# Patient Record
Sex: FEMALE | Race: WHITE | Employment: FULL TIME | URBAN - METROPOLITAN AREA
[De-identification: names, ages, dates, MRNs, and addresses within clinical notes are randomized per-mention and may not be internally consistent; named-entity substitution may affect disease eponyms.]

---

## 2017-11-03 ENCOUNTER — TELEPHONE (OUTPATIENT)
Dept: FAMILY MEDICINE CLINIC | Facility: CLINIC | Age: 37
End: 2017-11-03

## 2017-11-03 NOTE — TELEPHONE ENCOUNTER
Wants a px appt only on a Monday morning around 9am.  Has to have it after 12/12 to be covered by insurance and needs to have it by the end of the year. That only leaves 12/18 because we are closed for blanca which I explained to her.     She only wants

## 2018-12-17 ENCOUNTER — LAB ENCOUNTER (OUTPATIENT)
Dept: LAB | Age: 38
End: 2018-12-17
Attending: FAMILY MEDICINE
Payer: COMMERCIAL

## 2018-12-17 ENCOUNTER — OFFICE VISIT (OUTPATIENT)
Dept: FAMILY MEDICINE CLINIC | Facility: CLINIC | Age: 38
End: 2018-12-17
Payer: COMMERCIAL

## 2018-12-17 VITALS
DIASTOLIC BLOOD PRESSURE: 66 MMHG | BODY MASS INDEX: 21.52 KG/M2 | TEMPERATURE: 98 F | WEIGHT: 114 LBS | HEART RATE: 110 BPM | HEIGHT: 61 IN | SYSTOLIC BLOOD PRESSURE: 110 MMHG | RESPIRATION RATE: 16 BRPM | OXYGEN SATURATION: 98 %

## 2018-12-17 DIAGNOSIS — Z00.00 ANNUAL PHYSICAL EXAM: Primary | ICD-10-CM

## 2018-12-17 DIAGNOSIS — Z00.00 ANNUAL PHYSICAL EXAM: ICD-10-CM

## 2018-12-17 DIAGNOSIS — J02.9 SORE THROAT: ICD-10-CM

## 2018-12-17 PROCEDURE — 86706 HEP B SURFACE ANTIBODY: CPT | Performed by: FAMILY MEDICINE

## 2018-12-17 PROCEDURE — 86765 RUBEOLA ANTIBODY: CPT | Performed by: FAMILY MEDICINE

## 2018-12-17 PROCEDURE — 86735 MUMPS ANTIBODY: CPT | Performed by: FAMILY MEDICINE

## 2018-12-17 PROCEDURE — 87880 STREP A ASSAY W/OPTIC: CPT | Performed by: FAMILY MEDICINE

## 2018-12-17 PROCEDURE — 82306 VITAMIN D 25 HYDROXY: CPT | Performed by: FAMILY MEDICINE

## 2018-12-17 PROCEDURE — 80050 GENERAL HEALTH PANEL: CPT | Performed by: FAMILY MEDICINE

## 2018-12-17 PROCEDURE — 86787 VARICELLA-ZOSTER ANTIBODY: CPT | Performed by: FAMILY MEDICINE

## 2018-12-17 PROCEDURE — 36415 COLL VENOUS BLD VENIPUNCTURE: CPT | Performed by: FAMILY MEDICINE

## 2018-12-17 PROCEDURE — 99395 PREV VISIT EST AGE 18-39: CPT | Performed by: FAMILY MEDICINE

## 2018-12-17 PROCEDURE — 80061 LIPID PANEL: CPT | Performed by: FAMILY MEDICINE

## 2018-12-17 PROCEDURE — 86762 RUBELLA ANTIBODY: CPT | Performed by: FAMILY MEDICINE

## 2018-12-17 NOTE — H&P
HPI:   Chencho Rojo is a 43year old female who presents for a well woman exam. Symptoms: denies discharge, itching, burning or dysuria. No LMP recorded.   Previous pap: 7/11/16  Contraception: no                         Current Outpatient Medications: distress  SKIN: 3 non cancerous moles on the neck  HEENT: atraumatic, normocephalic; PERRLA, conjunctiva clear; ears, nose and throat are clear  NECK: supple,no adenopathy, no thyromegaly  BREASTS: no retractions, no suspicious mass, no nipple discharge or

## 2018-12-26 ENCOUNTER — TELEPHONE (OUTPATIENT)
Dept: FAMILY MEDICINE CLINIC | Facility: CLINIC | Age: 38
End: 2018-12-26

## 2018-12-26 DIAGNOSIS — Z01.84 IMMUNITY STATUS TESTING: Primary | ICD-10-CM

## 2018-12-26 NOTE — TELEPHONE ENCOUNTER
NEEDS TO NOTIFY US THAT PT NEEDS TO COME IN FOR A RE DRAW. PROCESSED INCORRECTLY. PLS NOTIFY PATIENT.

## 2018-12-26 NOTE — TELEPHONE ENCOUNTER
LM for pt to call us back see previous note quantiferon tb was not processed correctly pt needs redraw. Lab ordered.

## 2018-12-26 NOTE — TELEPHONE ENCOUNTER
Renette Opitz is leaving the office today, but will be back in the office tomorrow and Friday from 7:00am  to 3:00 pm quantiferon  TB Test was not processed correctly.

## 2018-12-26 NOTE — TELEPHONE ENCOUNTER
[facility] [phone]. What wasn't processed correctly?

## 2018-12-27 NOTE — TELEPHONE ENCOUNTER
Pt calling back - advised pt test was processed incorrectly and pt needs to return to lab to have test redrawn. Patient verbalized understanding and had no further questions.

## 2019-01-28 ENCOUNTER — TELEPHONE (OUTPATIENT)
Dept: FAMILY MEDICINE CLINIC | Facility: CLINIC | Age: 39
End: 2019-01-28

## 2019-01-28 ENCOUNTER — APPOINTMENT (OUTPATIENT)
Dept: LAB | Age: 39
End: 2019-01-28
Attending: FAMILY MEDICINE
Payer: COMMERCIAL

## 2019-01-28 DIAGNOSIS — Z01.84 IMMUNITY STATUS TESTING: ICD-10-CM

## 2019-01-28 PROCEDURE — 86480 TB TEST CELL IMMUN MEASURE: CPT | Performed by: FAMILY MEDICINE

## 2019-01-28 PROCEDURE — 36415 COLL VENOUS BLD VENIPUNCTURE: CPT | Performed by: FAMILY MEDICINE

## 2019-01-28 NOTE — TELEPHONE ENCOUNTER
Pt had q's about Hep B injection and TDAP. She dropped off a past record of injections she has had. Pls call her back to advise on what injections are needed. Also brought in forms to be filled out for physical she had in December.     Pls call when [facility]

## 2019-01-29 NOTE — TELEPHONE ENCOUNTER
Pt has had MMR titers done. Pt has not had HEP B titers, however has had 3 vaccines. Varicella titer done, polio booster given, tdap given 5/2010    Form for [facility] signed by YP, left in triage, Left message for pt to return call.

## 2019-01-30 LAB
M TB TUBERC IFN-G BLD QL: POSITIVE
M TB TUBERC IFN-G/MITOGEN IGNF BLD: 3.04 IU/ML
M TB TUBERC IFN-G/MITOGEN IGNF BLD: 3.71 IU/ML
M TB TUBERC IGNF/MITOGEN IGNF CONTROL: >10 IU/ML
MITOGEN IGNF BCKGRD COR BLD-ACNC: 0.15 IU/ML

## 2019-01-31 NOTE — TELEPHONE ENCOUNTER
Pt has had titers done, pt made appt Monday with YP for school px. May need repeat hep b vaccine. Will discuss with YP at appt. Pt will go for chest xray tomorrow. Forms in triage.

## 2019-02-01 ENCOUNTER — HOSPITAL ENCOUNTER (OUTPATIENT)
Dept: GENERAL RADIOLOGY | Facility: HOSPITAL | Age: 39
Discharge: HOME | End: 2019-02-01
Attending: FAMILY MEDICINE
Payer: COMMERCIAL

## 2019-02-01 DIAGNOSIS — R76.12 POSITIVE QUANTIFERON-TB GOLD TEST: ICD-10-CM

## 2019-02-01 PROCEDURE — 71046 X-RAY EXAM CHEST 2 VIEWS: CPT | Performed by: FAMILY MEDICINE

## 2019-02-04 ENCOUNTER — OFFICE VISIT (OUTPATIENT)
Dept: FAMILY MEDICINE CLINIC | Facility: CLINIC | Age: 39
End: 2019-02-04
Payer: COMMERCIAL

## 2019-02-04 VITALS
HEIGHT: 61 IN | DIASTOLIC BLOOD PRESSURE: 70 MMHG | OXYGEN SATURATION: 99 % | HEART RATE: 78 BPM | WEIGHT: 114 LBS | RESPIRATION RATE: 16 BRPM | SYSTOLIC BLOOD PRESSURE: 108 MMHG | BODY MASS INDEX: 21.52 KG/M2 | TEMPERATURE: 98 F

## 2019-02-04 DIAGNOSIS — Z23 NEED FOR TDAP VACCINATION: ICD-10-CM

## 2019-02-04 DIAGNOSIS — G56.03 BILATERAL CARPAL TUNNEL SYNDROME: Primary | ICD-10-CM

## 2019-02-04 DIAGNOSIS — Z23 NEED FOR HEPATITIS B VACCINATION: ICD-10-CM

## 2019-02-04 PROCEDURE — 90471 IMMUNIZATION ADMIN: CPT | Performed by: FAMILY MEDICINE

## 2019-02-04 PROCEDURE — 99213 OFFICE O/P EST LOW 20 MIN: CPT | Performed by: FAMILY MEDICINE

## 2019-02-04 PROCEDURE — 90746 HEPB VACCINE 3 DOSE ADULT IM: CPT | Performed by: FAMILY MEDICINE

## 2019-02-04 PROCEDURE — 90472 IMMUNIZATION ADMIN EACH ADD: CPT | Performed by: FAMILY MEDICINE

## 2019-02-04 PROCEDURE — 90715 TDAP VACCINE 7 YRS/> IM: CPT | Performed by: FAMILY MEDICINE

## 2019-02-04 NOTE — PROGRESS NOTES
HPI:    Patient ID: Hermann Cooper is a 43year old female. Wrist Pain    The pain is present in the right wrist and left wrist. This is a chronic problem. The current episode started more than 1 year ago. There has been no history of extremity trauma. while sleeping at night and to avoid sleeping on her wrists    2. Need for hepatitis B vaccination  - HEPATITIS B VACCINE, OVER 20    3.  Need for Tdap vaccination  - TETANUS, DIPHTHERIA TOXOIDS AND ACELLULAR PERTUSIS VACCINE (TDAP), >7 YEARS, IM USE      O

## 2019-10-05 ENCOUNTER — NURSE ONLY (OUTPATIENT)
Dept: FAMILY MEDICINE CLINIC | Facility: CLINIC | Age: 39
End: 2019-10-05
Payer: COMMERCIAL

## 2019-10-05 DIAGNOSIS — Z23 NEED FOR VACCINATION: ICD-10-CM

## 2019-10-05 PROCEDURE — 90471 IMMUNIZATION ADMIN: CPT | Performed by: FAMILY MEDICINE

## 2019-10-05 PROCEDURE — 90686 IIV4 VACC NO PRSV 0.5 ML IM: CPT | Performed by: FAMILY MEDICINE

## 2019-11-16 DIAGNOSIS — Z00.00 LABORATORY EXAMINATION ORDERED AS PART OF A COMPLETE PHYSICAL EXAMINATION: ICD-10-CM

## 2019-11-16 DIAGNOSIS — Z12.31 ENCOUNTER FOR SCREENING MAMMOGRAM FOR MALIGNANT NEOPLASM OF BREAST: Primary | ICD-10-CM

## 2019-11-30 ENCOUNTER — HOSPITAL ENCOUNTER (OUTPATIENT)
Age: 39
Discharge: HOME | End: 2019-11-30
Attending: FAMILY MEDICINE
Payer: COMMERCIAL

## 2019-11-30 VITALS
HEART RATE: 68 BPM | DIASTOLIC BLOOD PRESSURE: 100 MMHG | OXYGEN SATURATION: 100 % | RESPIRATION RATE: 18 BRPM | SYSTOLIC BLOOD PRESSURE: 135 MMHG | TEMPERATURE: 99 F

## 2019-11-30 DIAGNOSIS — S61.210A LACERATION OF RIGHT INDEX FINGER WITHOUT FOREIGN BODY WITHOUT DAMAGE TO NAIL, INITIAL ENCOUNTER: Primary | ICD-10-CM

## 2019-11-30 DIAGNOSIS — L03.011 CELLULITIS OF FINGER OF RIGHT HAND: ICD-10-CM

## 2019-11-30 PROCEDURE — 99213 OFFICE O/P EST LOW 20 MIN: CPT

## 2019-11-30 PROCEDURE — 99203 OFFICE O/P NEW LOW 30 MIN: CPT

## 2019-11-30 RX ORDER — CEPHALEXIN 500 MG/1
500 CAPSULE ORAL 3 TIMES DAILY
Qty: 21 CAPSULE | Refills: 0 | Status: SHIPPED | OUTPATIENT
Start: 2019-11-30 | End: 2019-12-07

## 2019-11-30 RX ORDER — TRAMADOL HYDROCHLORIDE 50 MG/1
50 TABLET ORAL EVERY 8 HOURS PRN
Qty: 15 TABLET | Refills: 0 | Status: SHIPPED | OUTPATIENT
Start: 2019-11-30 | End: 2019-12-05

## 2019-12-03 ENCOUNTER — HOSPITAL ENCOUNTER (OUTPATIENT)
Age: 39
Discharge: HOME | End: 2019-12-03
Attending: FAMILY MEDICINE
Payer: COMMERCIAL

## 2019-12-03 VITALS
OXYGEN SATURATION: 100 % | SYSTOLIC BLOOD PRESSURE: 122 MMHG | DIASTOLIC BLOOD PRESSURE: 88 MMHG | HEIGHT: 62 IN | WEIGHT: 116 LBS | TEMPERATURE: 99 F | RESPIRATION RATE: 18 BRPM | HEART RATE: 79 BPM | BODY MASS INDEX: 21.35 KG/M2

## 2019-12-03 DIAGNOSIS — L03.011 CELLULITIS OF FINGER OF RIGHT HAND: Primary | ICD-10-CM

## 2019-12-03 PROCEDURE — 99212 OFFICE O/P EST SF 10 MIN: CPT

## 2019-12-03 PROCEDURE — 10060 I&D ABSCESS SIMPLE/SINGLE: CPT

## 2019-12-03 PROCEDURE — 99213 OFFICE O/P EST LOW 20 MIN: CPT

## 2019-12-04 NOTE — ED INITIAL ASSESSMENT (HPI)
C/O right index finger pain that started last week. She had been treated for a cellulitis w/ Keflex on Saturday. Now is concerned that the infection is worsening and has a \"blister to side\".

## 2019-12-04 NOTE — ED PROVIDER NOTES
Patient Seen in: [facility]      History   Patient presents with:  Finger Pain    Stated Complaint: RIGHT INDEX FINGER INFECTION X 5 DAYS    HPI  34 yo F with right index finger infection that started 5 days ago, currently on Ke distress  SKIN: no rashes,no suspicious lesions noted over the exposed areas of skin   HEENT: atraumatic, normocephalic  NECK: supple  LUNGS: No tachypnea   CARDIO: No tachycardia   GI: not distended     EXTREMITIES:   Exam of R Hand -->   - swelling: ++ a

## 2019-12-11 ENCOUNTER — HOSPITAL ENCOUNTER (OUTPATIENT)
Dept: MAMMOGRAPHY | Age: 39
Discharge: HOME | End: 2019-12-11
Attending: FAMILY MEDICINE
Payer: COMMERCIAL

## 2019-12-11 ENCOUNTER — LAB ENCOUNTER (OUTPATIENT)
Dept: LAB | Age: 39
End: 2019-12-11
Attending: FAMILY MEDICINE
Payer: COMMERCIAL

## 2019-12-11 DIAGNOSIS — Z00.00 LABORATORY EXAMINATION ORDERED AS PART OF A COMPLETE PHYSICAL EXAMINATION: ICD-10-CM

## 2019-12-11 DIAGNOSIS — Z12.39 BREAST CANCER SCREENING: ICD-10-CM

## 2019-12-11 PROCEDURE — 82306 VITAMIN D 25 HYDROXY: CPT

## 2019-12-11 PROCEDURE — 80061 LIPID PANEL: CPT

## 2019-12-11 PROCEDURE — 36415 COLL VENOUS BLD VENIPUNCTURE: CPT

## 2019-12-11 PROCEDURE — 80053 COMPREHEN METABOLIC PANEL: CPT

## 2019-12-11 PROCEDURE — 77067 SCR MAMMO BI INCL CAD: CPT | Performed by: FAMILY MEDICINE

## 2019-12-11 PROCEDURE — 77063 BREAST TOMOSYNTHESIS BI: CPT | Performed by: FAMILY MEDICINE

## 2019-12-11 PROCEDURE — 84443 ASSAY THYROID STIM HORMONE: CPT

## 2019-12-11 PROCEDURE — 85025 COMPLETE CBC W/AUTO DIFF WBC: CPT

## 2020-01-03 ENCOUNTER — TELEPHONE (OUTPATIENT)
Dept: FAMILY MEDICINE CLINIC | Facility: CLINIC | Age: 40
End: 2020-01-03

## 2020-01-03 NOTE — TELEPHONE ENCOUNTER
PT GOT HER MAMMO ON 12-03-10. GOT A BILL FROM INSURANCE .00. HER INSURANCE STATES IS WAS NOT CODED FOR PHYSICAL. CAN WE RE-CODE THIS AND SEND IT THROUGH AGAIN?    PLS CALL PATIENT BACK TO ADVISE.

## 2020-01-06 NOTE — TELEPHONE ENCOUNTER
Will correct diagnosis from Z12.39 to Z12.31 and submit to [facility] coding change request for resubmission to [facility] insurance.

## 2023-06-14 ENCOUNTER — HOSPITAL ENCOUNTER (INPATIENT)
Facility: HOSPITAL | Age: 43
LOS: 7 days | Discharge: HOME | End: 2023-06-21
Attending: EMERGENCY MEDICINE | Admitting: INTERNAL MEDICINE
Payer: COMMERCIAL

## 2023-06-14 ENCOUNTER — APPOINTMENT (OUTPATIENT)
Dept: ULTRASOUND IMAGING | Facility: HOSPITAL | Age: 43
End: 2023-06-14
Attending: EMERGENCY MEDICINE
Payer: COMMERCIAL

## 2023-06-14 DIAGNOSIS — R53.1 WEAKNESS: ICD-10-CM

## 2023-06-14 DIAGNOSIS — R79.89 ABNORMAL LFTS: Primary | ICD-10-CM

## 2023-06-14 DIAGNOSIS — F10.10 ALCOHOL ABUSE: ICD-10-CM

## 2023-06-14 DIAGNOSIS — E80.6 HYPERBILIRUBINEMIA: ICD-10-CM

## 2023-06-14 DIAGNOSIS — E87.6 HYPOKALEMIA: ICD-10-CM

## 2023-06-14 LAB
ALBUMIN SERPL-MCNC: 3.3 G/DL (ref 3.4–5)
ALBUMIN/GLOB SERPL: 1 {RATIO} (ref 1–2)
ALP LIVER SERPL-CCNC: 208 U/L
ALT SERPL-CCNC: 124 U/L
ANION GAP SERPL CALC-SCNC: 9 MMOL/L (ref 0–18)
AST SERPL-CCNC: 145 U/L (ref 15–37)
BASOPHILS # BLD AUTO: 0.01 X10(3) UL (ref 0–0.2)
BASOPHILS NFR BLD AUTO: 0.2 %
BILIRUB SERPL-MCNC: 3.7 MG/DL (ref 0.1–2)
BUN BLD-MCNC: 4 MG/DL (ref 7–18)
CALCIUM BLD-MCNC: 9.1 MG/DL (ref 8.5–10.1)
CHLORIDE SERPL-SCNC: 94 MMOL/L (ref 98–112)
CO2 SERPL-SCNC: 34 MMOL/L (ref 21–32)
CREAT BLD-MCNC: 0.66 MG/DL
EOSINOPHIL # BLD AUTO: 0.01 X10(3) UL (ref 0–0.7)
EOSINOPHIL NFR BLD AUTO: 0.2 %
ERYTHROCYTE [DISTWIDTH] IN BLOOD BY AUTOMATED COUNT: 14.1 %
ETHANOL SERPL-MCNC: <3 MG/DL (ref ?–3)
GFR SERPLBLD BASED ON 1.73 SQ M-ARVRAT: 111 ML/MIN/1.73M2 (ref 60–?)
GLOBULIN PLAS-MCNC: 3.4 G/DL (ref 2.8–4.4)
GLUCOSE BLD-MCNC: 115 MG/DL (ref 70–99)
HBV SURFACE AB SER QL: NONREACTIVE
HBV SURFACE AB SERPL IA-ACNC: <3.1 MIU/ML
HBV SURFACE AG SER-ACNC: <0.1 [IU]/L
HBV SURFACE AG SERPL QL IA: NONREACTIVE
HCT VFR BLD AUTO: 35.1 %
HGB BLD-MCNC: 12 G/DL
IMM GRANULOCYTES # BLD AUTO: 0.02 X10(3) UL (ref 0–1)
IMM GRANULOCYTES NFR BLD: 0.4 %
LIPASE SERPL-CCNC: 49 U/L (ref 13–75)
LYMPHOCYTES # BLD AUTO: 1.63 X10(3) UL (ref 1–4)
LYMPHOCYTES NFR BLD AUTO: 36.4 %
MCH RBC QN AUTO: 34.3 PG (ref 26–34)
MCHC RBC AUTO-ENTMCNC: 34.2 G/DL (ref 31–37)
MCV RBC AUTO: 100.3 FL
MONOCYTES # BLD AUTO: 0.55 X10(3) UL (ref 0.1–1)
MONOCYTES NFR BLD AUTO: 12.3 %
NEUTROPHILS # BLD AUTO: 2.26 X10 (3) UL (ref 1.5–7.7)
NEUTROPHILS # BLD AUTO: 2.26 X10(3) UL (ref 1.5–7.7)
NEUTROPHILS NFR BLD AUTO: 50.5 %
OSMOLALITY SERPL CALC.SUM OF ELEC: 282 MOSM/KG (ref 275–295)
PLATELET # BLD AUTO: 177 10(3)UL (ref 150–450)
POTASSIUM SERPL-SCNC: 2.2 MMOL/L (ref 3.5–5.1)
PROT SERPL-MCNC: 6.7 G/DL (ref 6.4–8.2)
RBC # BLD AUTO: 3.5 X10(6)UL
SODIUM SERPL-SCNC: 137 MMOL/L (ref 136–145)
WBC # BLD AUTO: 4.5 X10(3) UL (ref 4–11)

## 2023-06-14 PROCEDURE — 76700 US EXAM ABDOM COMPLETE: CPT | Performed by: EMERGENCY MEDICINE

## 2023-06-14 PROCEDURE — 99223 1ST HOSP IP/OBS HIGH 75: CPT | Performed by: INTERNAL MEDICINE

## 2023-06-14 RX ORDER — MELATONIN
3 NIGHTLY PRN
Status: DISCONTINUED | OUTPATIENT
Start: 2023-06-14 | End: 2023-06-21

## 2023-06-14 RX ORDER — ENOXAPARIN SODIUM 100 MG/ML
40 INJECTION SUBCUTANEOUS DAILY
Status: DISCONTINUED | OUTPATIENT
Start: 2023-06-15 | End: 2023-06-21

## 2023-06-14 RX ORDER — SODIUM CHLORIDE, SODIUM LACTATE, POTASSIUM CHLORIDE, CALCIUM CHLORIDE 600; 310; 30; 20 MG/100ML; MG/100ML; MG/100ML; MG/100ML
INJECTION, SOLUTION INTRAVENOUS CONTINUOUS
Status: ACTIVE | OUTPATIENT
Start: 2023-06-14 | End: 2023-06-15

## 2023-06-14 RX ORDER — ENEMA 19; 7 G/133ML; G/133ML
1 ENEMA RECTAL ONCE AS NEEDED
Status: DISCONTINUED | OUTPATIENT
Start: 2023-06-14 | End: 2023-06-21

## 2023-06-14 RX ORDER — SENNOSIDES 8.6 MG
17.2 TABLET ORAL NIGHTLY PRN
Status: DISCONTINUED | OUTPATIENT
Start: 2023-06-14 | End: 2023-06-21

## 2023-06-14 RX ORDER — BISACODYL 10 MG
10 SUPPOSITORY, RECTAL RECTAL
Status: DISCONTINUED | OUTPATIENT
Start: 2023-06-14 | End: 2023-06-21

## 2023-06-14 RX ORDER — HYDROMORPHONE HYDROCHLORIDE 1 MG/ML
0.4 INJECTION, SOLUTION INTRAMUSCULAR; INTRAVENOUS; SUBCUTANEOUS EVERY 2 HOUR PRN
Status: DISCONTINUED | OUTPATIENT
Start: 2023-06-14 | End: 2023-06-17

## 2023-06-14 RX ORDER — ACETAMINOPHEN 500 MG
500 TABLET ORAL EVERY 4 HOURS PRN
Status: DISCONTINUED | OUTPATIENT
Start: 2023-06-14 | End: 2023-06-21

## 2023-06-14 RX ORDER — HYDROMORPHONE HYDROCHLORIDE 1 MG/ML
0.8 INJECTION, SOLUTION INTRAMUSCULAR; INTRAVENOUS; SUBCUTANEOUS EVERY 2 HOUR PRN
Status: DISCONTINUED | OUTPATIENT
Start: 2023-06-14 | End: 2023-06-17

## 2023-06-14 RX ORDER — POLYETHYLENE GLYCOL 3350 17 G/17G
17 POWDER, FOR SOLUTION ORAL DAILY PRN
Status: DISCONTINUED | OUTPATIENT
Start: 2023-06-14 | End: 2023-06-21

## 2023-06-14 RX ORDER — ONDANSETRON 2 MG/ML
4 INJECTION INTRAMUSCULAR; INTRAVENOUS EVERY 6 HOURS PRN
Status: DISCONTINUED | OUTPATIENT
Start: 2023-06-14 | End: 2023-06-21

## 2023-06-14 RX ORDER — PROCHLORPERAZINE EDISYLATE 5 MG/ML
5 INJECTION INTRAMUSCULAR; INTRAVENOUS EVERY 8 HOURS PRN
Status: DISCONTINUED | OUTPATIENT
Start: 2023-06-14 | End: 2023-06-21

## 2023-06-14 RX ORDER — HYDROMORPHONE HYDROCHLORIDE 1 MG/ML
0.2 INJECTION, SOLUTION INTRAMUSCULAR; INTRAVENOUS; SUBCUTANEOUS EVERY 2 HOUR PRN
Status: DISCONTINUED | OUTPATIENT
Start: 2023-06-14 | End: 2023-06-21

## 2023-06-15 ENCOUNTER — APPOINTMENT (OUTPATIENT)
Dept: CT IMAGING | Facility: HOSPITAL | Age: 43
End: 2023-06-15
Attending: FAMILY MEDICINE
Payer: COMMERCIAL

## 2023-06-15 LAB
A1AT SERPL-MCNC: 149 MG/DL (ref 90–200)
ALBUMIN SERPL-MCNC: 2.3 G/DL (ref 3.4–5)
ALBUMIN/GLOB SERPL: 0.9 {RATIO} (ref 1–2)
ALP LIVER SERPL-CCNC: 143 U/L
ALT SERPL-CCNC: 78 U/L
AMPHET UR QL SCN: NEGATIVE
ANION GAP SERPL CALC-SCNC: 7 MMOL/L (ref 0–18)
APTT PPP: 27.3 SECONDS (ref 23.3–35.6)
AST SERPL-CCNC: 94 U/L (ref 15–37)
BENZODIAZ UR QL SCN: NEGATIVE
BILIRUB SERPL-MCNC: 3 MG/DL (ref 0.1–2)
BILIRUB UR QL STRIP.AUTO: NEGATIVE
BUN BLD-MCNC: 3 MG/DL (ref 7–18)
CALCIUM BLD-MCNC: 7.6 MG/DL (ref 8.5–10.1)
CANNABINOIDS UR QL SCN: NEGATIVE
CERULOPLASMIN SERPL-MCNC: 21.7 MG/DL (ref 20–60)
CHLORIDE SERPL-SCNC: 105 MMOL/L (ref 98–112)
CLARITY UR REFRACT.AUTO: CLEAR
CO2 SERPL-SCNC: 29 MMOL/L (ref 21–32)
COCAINE UR QL: NEGATIVE
COLOR UR AUTO: YELLOW
CREAT BLD-MCNC: 0.28 MG/DL
CREAT UR-SCNC: 47.1 MG/DL
DEPRECATED HBV CORE AB SER IA-ACNC: 341.5 NG/ML
ERYTHROCYTE [DISTWIDTH] IN BLOOD BY AUTOMATED COUNT: 14.8 %
FOLATE SERPL-MCNC: 3.2 NG/ML (ref 8.7–?)
GFR SERPLBLD BASED ON 1.73 SQ M-ARVRAT: 136 ML/MIN/1.73M2 (ref 60–?)
GLOBULIN PLAS-MCNC: 2.7 G/DL (ref 2.8–4.4)
GLUCOSE BLD-MCNC: 91 MG/DL (ref 70–99)
GLUCOSE UR STRIP.AUTO-MCNC: NEGATIVE MG/DL
HAV IGM SER QL: NONREACTIVE
HBV CORE IGM SER QL: NONREACTIVE
HBV SURFACE AG SERPL QL IA: NONREACTIVE
HCG UR QL: NEGATIVE
HCT VFR BLD AUTO: 27 %
HCV AB SERPL QL IA: NONREACTIVE
HGB BLD-MCNC: 8.9 G/DL
INR BLD: 1.07 (ref 0.85–1.16)
LEUKOCYTE ESTERASE UR QL STRIP.AUTO: NEGATIVE
MAGNESIUM SERPL-MCNC: 1.7 MG/DL (ref 1.6–2.6)
MCH RBC QN AUTO: 35.3 PG (ref 26–34)
MCHC RBC AUTO-ENTMCNC: 33 G/DL (ref 31–37)
MCV RBC AUTO: 107.1 FL
MDMA UR QL SCN: NEGATIVE
NITRITE UR QL STRIP.AUTO: NEGATIVE
OPIATES UR QL SCN: NEGATIVE
OSMOLALITY SERPL CALC.SUM OF ELEC: 288 MOSM/KG (ref 275–295)
OXYCODONE UR QL SCN: NEGATIVE
PH UR STRIP.AUTO: 9 [PH] (ref 5–8)
PHOSPHATE SERPL-MCNC: 2.1 MG/DL (ref 2.5–4.9)
PLATELET # BLD AUTO: 137 10(3)UL (ref 150–450)
POTASSIUM SERPL-SCNC: 2.3 MMOL/L (ref 3.5–5.1)
POTASSIUM SERPL-SCNC: 3.1 MMOL/L (ref 3.5–5.1)
PROT SERPL-MCNC: 5 G/DL (ref 6.4–8.2)
PROT UR STRIP.AUTO-MCNC: NEGATIVE MG/DL
PROTHROMBIN TIME: 13.9 SECONDS (ref 11.6–14.8)
RBC # BLD AUTO: 2.52 X10(6)UL
RBC UR QL AUTO: NEGATIVE
SODIUM SERPL-SCNC: 141 MMOL/L (ref 136–145)
SP GR UR STRIP.AUTO: 1 (ref 1–1.03)
TSI SER-ACNC: 1.63 MIU/ML (ref 0.36–3.74)
UROBILINOGEN UR STRIP.AUTO-MCNC: 4 MG/DL
VIT B12 SERPL-MCNC: >2000 PG/ML (ref 193–986)
WBC # BLD AUTO: 3.2 X10(3) UL (ref 4–11)

## 2023-06-15 PROCEDURE — 74177 CT ABD & PELVIS W/CONTRAST: CPT | Performed by: FAMILY MEDICINE

## 2023-06-15 PROCEDURE — 99233 SBSQ HOSP IP/OBS HIGH 50: CPT | Performed by: INTERNAL MEDICINE

## 2023-06-15 PROCEDURE — 99223 1ST HOSP IP/OBS HIGH 75: CPT | Performed by: SURGERY

## 2023-06-15 RX ORDER — SODIUM CHLORIDE 9 MG/ML
INJECTION, SOLUTION INTRAVENOUS CONTINUOUS
Status: DISCONTINUED | OUTPATIENT
Start: 2023-06-15 | End: 2023-06-18

## 2023-06-15 RX ORDER — LORAZEPAM 1 MG/1
2 TABLET ORAL
Status: DISCONTINUED | OUTPATIENT
Start: 2023-06-15 | End: 2023-06-21

## 2023-06-15 RX ORDER — MELATONIN
100 2 TIMES DAILY
Status: DISCONTINUED | OUTPATIENT
Start: 2023-06-15 | End: 2023-06-21

## 2023-06-15 RX ORDER — LORAZEPAM 2 MG/ML
1 INJECTION INTRAMUSCULAR
Status: DISCONTINUED | OUTPATIENT
Start: 2023-06-15 | End: 2023-06-21

## 2023-06-15 RX ORDER — MULTIPLE VITAMINS W/ MINERALS TAB 9MG-400MCG
1 TAB ORAL DAILY
Status: DISCONTINUED | OUTPATIENT
Start: 2023-06-15 | End: 2023-06-21

## 2023-06-15 RX ORDER — LORAZEPAM 1 MG/1
1 TABLET ORAL
Status: DISCONTINUED | OUTPATIENT
Start: 2023-06-15 | End: 2023-06-21

## 2023-06-15 RX ORDER — MAGNESIUM SULFATE HEPTAHYDRATE 40 MG/ML
2 INJECTION, SOLUTION INTRAVENOUS ONCE
Status: COMPLETED | OUTPATIENT
Start: 2023-06-15 | End: 2023-06-15

## 2023-06-15 RX ORDER — LORAZEPAM 2 MG/ML
2 INJECTION INTRAMUSCULAR
Status: DISCONTINUED | OUTPATIENT
Start: 2023-06-15 | End: 2023-06-21

## 2023-06-15 NOTE — PROGRESS NOTES
NURSING ADMISSION NOTE        Patient admitted via cart  Oriented to room. Safety precautions initiated. Bed in low position. Call light in reach. Patient received alert and oriented x4. C/o weakness, fatigue, numbness and tingling in B/L LE, low appetite, and abdominal pain. BP low upon arrival to unit: 84/55. Dr. Luis Medina paged. Received orders for 500 ml bolus, given and BP unchanged. Pt asymptomatic. MD aware, ordered to continue to monitor while pt is asymptomatic. Pt started now on LR @100 ml/hr. Sats stable on room air. Afebrile. C/o mild to moderate abdominal pain and generalized pains, declined medication. Ambulatory with steady gait. K+ given and IVF infusing. K+ lab redraw ordered per protocol 4 hours after infusion completed. NPO since MN. Call light within reach. Pt continues to be hypotensive. Dr. Luis Medina paged and made aware. Pt received 1,500 ml 0.9 IVF boluses, 500 ml LR maintenance IVF, and orthostatics were completed. Last BP lying is 71/50 and 83/62 lying. Dr. Luis Medina reviewing work up.

## 2023-06-15 NOTE — ED QUICK NOTES
Orders for admission, patient is aware of plan and ready to go upstairs. Any questions, please call ED RN Marlana Boeck at extension [phone].      Patient Covid vaccination status: Unvaccinated     COVID Test Ordered in ED: None    COVID Suspicion at Admission: N/A    Running Infusions:      Mental Status/LOC at time of transport: A/O X2    Other pertinent information: walks with steady gait  CIWA score: N/A   NIH score:  N/A

## 2023-06-15 NOTE — ED QUICK NOTES
Rounding Completed    Plan of Care reviewed. Waiting for ultrasound result. Elimination needs assessed. Provided warm blanket. Bed is locked and in lowest position. Call light within reach.

## 2023-06-15 NOTE — BH PROGRESS NOTE
Went to see the patient for possible cd treatment options. The patient admits to drinking wine. She said, during the week she will have 2-3 small glasses. On the week end she reports having more. Nghia Darian reports her last drink was 1 week ago. She declined any cd treatment but will to have referral programs listed in the discharge summary.

## 2023-06-15 NOTE — ED QUICK NOTES
Rounding Completed    Plan of Care reviewed. Waiting for ultrasound. Elimination needs assessed. Provided warm blanket. Bed is locked and in lowest position. Call light within reach.

## 2023-06-15 NOTE — PLAN OF CARE
The patient is A/Ox4, very pleasant and cooperative with care. On RA, no SOB  Tele - NSR  SBP  improved. Vitals Stable  Afebrile  No c/o of pain, reports some discomfort in RUQ that \"comes and goes\", abdomen is non-tender. The patient had a BM today. Voids. GI and General Surgery are following. Diet advance as tolerated, the patient reports feeling \"very hungry\". Tolerates diet well. On Zosyn per MAR. Critical K of 2.3, Hospitalist notified, replacing K and Mg per protocol per MAR. CIWA protocol, not scoring for Ativan. IVF - NaCl @ 125ml/hr. CT abd+pelvis is pending, and per radiology it will be completed today @ 2000. US abdomen will be completed on 06/16, the patient will be NPO after midnight. Per radiology, the patient needs to be 8 hours NPO prior. Safety precautions in place. Staff will continue to monitor. Problem: Patient/Family Goals  Goal: Patient/Family Long Term Goal  Description: Patient's Long Term Goal: dc    Interventions:  - medications   - See additional Care Plan goals for specific interventions  Outcome: Progressing  Goal: Patient/Family Short Term Goal  Description: Patient's Short Term Goal: safety    Interventions:   - frequent rounding   - See additional Care Plan goals for specific interventions  Outcome: Progressing     Problem: RISK FOR INFECTION - ADULT  Goal: Absence of fever/infection during anticipated neutropenic period  Description: INTERVENTIONS  - Monitor WBC  - Administer growth factors as ordered  - Implement neutropenic guidelines  Outcome: Progressing     Problem: SAFETY ADULT - FALL  Goal: Free from fall injury  Description: INTERVENTIONS:  - Assess pt frequently for physical needs  - Identify cognitive and physical deficits and behaviors that affect risk of falls.   - [place] fall precautions as indicated by assessment.  - Educate pt/family on patient safety including physical limitations  - Instruct pt to call for assistance with activity based on assessment  - Modify environment to reduce risk of injury  - Provide assistive devices as appropriate  - Consider OT/PT consult to assist with strengthening/mobility  - Encourage toileting schedule  Outcome: Progressing     Problem: DISCHARGE PLANNING  Goal: Discharge to home or other facility with appropriate resources  Description: INTERVENTIONS:  - Identify barriers to discharge w/pt and caregiver  - Include patient/family/discharge partner in discharge planning  - Arrange for needed discharge resources and transportation as appropriate  - Identify discharge learning needs (meds, wound care, etc)  - Arrange for interpreters to assist at discharge as needed  - Consider post-discharge preferences of patient/family/discharge partner  - Complete POLST form as appropriate  - Assess patient's ability to be responsible for managing their own health  - Refer to Case Management Department for coordinating discharge planning if the patient needs post-hospital services based on physician/LIP order or complex needs related to functional status, cognitive ability or social support system  Outcome: Progressing

## 2023-06-15 NOTE — ED QUICK NOTES
Rounding Completed    Plan of Care reviewed. Waiting for testing results. Elimination needs assessed. Provided warm blanket. Bed is locked and in lowest position. Call light within reach.

## 2023-06-15 NOTE — ED QUICK NOTES
Handoff report provided to [facility] [place]. Awaiting transport to inpatient unit.

## 2023-06-15 NOTE — DISCHARGE INSTRUCTIONS
Referrals for a chemical dependency program.    [facility] [address]  [facility], [address]  Phone: [phone]    [facility] -- [address] [facility]  [address]  [place], [address] [facility]  Phone: [phone]    [facility]  [address]   [facility], [address]  Phone: [phone]    [facility].  [address] [facility], [address]  Phone: [phone]    [facility]  [address] [facility] [address], [address] [place]  Phone: [phone]    [facility]  [address]  [facility] [address]  Phone: [phone] to [address], [address] [facility]  Phone: [phone]    [facility]  [address] [facility], [address]  Phone: [phone]    [address]  [address] [facility], [facility]  Phone: [phone]    [facility]  [address], [address] [facility]  Phone: [phone]    [facility] [address] [address]  Phone: [phone]    Financial assistance  Financial worries are the last thing you need when you're trying to get better. If you have questions about managing the cost of your care, our financial counselors can help guide you through. Financial counselors can be reached at [phone] or by email at [email or web address]. For billing questions or concerns, please email [email or web address]. Our financial counselors can help you figure out a payment plan that's manageable for you and suggest financial assistance programs and services to help reduce costs. Eligibility for financial assistance  The determination of eligibility for financial assistance is based on a review of the patient's and/or guarantor's actual income, expenses and assets.  This information is used to determine if there is adequate financial means to pay bills from [employer]. If eligible, financial assistance will be provided in the form of a discount depending on income levels from all sources. The discount will be 100 percent for individuals earning up to 200 percent of the federal poverty level. A percentage of cost discount will be provided to patients that exceed 200 percent of the poverty line but do not exceed 600 percent of the poverty level. Eligibility for uninsured discounts  An uninsured individual who does not qualify for medical or financial indigence, or an individual who does not have insurance benefits for a particular medically necessary service, may receive an uninsured discount. To determine eligibility for an uninsured discount, patients must fully cooperate with [employer] by providing information about third party coverage. Uninsured patients must not earn more than 600 percent of the federal poverty guidelines. Patients must provide necessary documentation and complete appropriate application forms to apply for third-party coverage that may be available, including coverage from a healthcare insurer, Medicare, Medicaid, automobile insurance, worker's compensation or any other insurance. A payment plan for the portion of the bill which is not discounted may not exceed six months, unless this would place undue hardship on the patient. In such case, the payment plan should not exceed one year. How to apply for assistance  Patients, or a person acting on the patient's behalf, who desire financial assistance and/or an uninsured discount from [employer] must complete an application and submit information as evidence of eligibility.    Questions about financial assistance, how to receive paper copies of our policy, application, or uninsured discounts can be directed to [phone] at [facility] or at [phone] at [facility]. For all other billing-related questions, please contact our Patient Accounts Department at [phone] at [facility] or at [phone] at [address] [place] [address]  As required by the state of [place], [employer] annually files a report of its community benefit plan with the [place] office and publishes the report which details how we are improving the health of the communities we serve. Each year, we provide millions of dollars in benefits, including: a generous [address] that exceeds standards recommended by the [address] [place] [address], a wide variety of community education programs and events offered throughout the year, and language assistance for patients and their families requiring translation services. Learn more about [facility]'s community benefit report. Additionally, the [facility]) report and implementation plan is available to the public. Contact the following to request the public reports:  [facility] [address], [address] [place] [address]  [phone]    Nondiscrimination statement  It is our mission to provide quality healthcare services to people who live in our community with efficiency, sensitivity and a commitment to the human dignity and wellness of the individual. We make no differentiation between an individual's ability to meet the costs of healthcare and the quality of services we provide, regardless of race, creed, color, sex, national origin, sexual orientation, handicap or age. As a healthcare institution, we recognize as part of our mission the need for those who are medically or financially indigent to receive care, and will assist patients who cannot pay for part or all of the care they receive.  At the same time, the need for financial assistance for these patients is always balanced with our broader financial responsibility to keep our doors open for all who live in our community and may need care, now and in the future. General Medical Follow up:  [employer] (healthcare clinic) www.[employer]. [employer]  [facility] welcomes patients with Medicaid, Medicare, private insurance or no insurance at all, possibly at a discounted rate. [facility] offers low cost prescriptions drugs when seen by a [facility] physician. [address] [place] [address] [facility] [address], [place], [address] [place] [phone]  [facility] [address], [facility] [phone]  [facility] [address] [facility] [place], [facility] [phone]  [facility] [address] [place] [address]., [address], [address]  Verlie Grippe [address] [phone] o al [phone]. Visit [employer] for discounted prescription drug coupons or [employer] for $4 prescriptions [facility].ca    [place]overed [place]- website to apply for insurance through the Marketplace getcovered. genny. [address] (only apply if you do not Qualify for medicaid)  [facility] [place]  [address] [facility]; [address] [facility]  Phone: [phone]  [email or web address]      [address]  Marya Lopez   [phone]  [email or web address]

## 2023-06-16 ENCOUNTER — APPOINTMENT (OUTPATIENT)
Dept: ULTRASOUND IMAGING | Facility: HOSPITAL | Age: 43
End: 2023-06-16
Attending: FAMILY MEDICINE
Payer: COMMERCIAL

## 2023-06-16 LAB
ALBUMIN SERPL-MCNC: 2.1 G/DL (ref 3.4–5)
ALBUMIN/GLOB SERPL: 0.8 {RATIO} (ref 1–2)
ALP LIVER SERPL-CCNC: 135 U/L
ALT SERPL-CCNC: 67 U/L
ANION GAP SERPL CALC-SCNC: 9 MMOL/L (ref 0–18)
AST SERPL-CCNC: 80 U/L (ref 15–37)
BASOPHILS # BLD AUTO: 0.01 X10(3) UL (ref 0–0.2)
BASOPHILS NFR BLD AUTO: 0.3 %
BILIRUB SERPL-MCNC: 3 MG/DL (ref 0.1–2)
BUN BLD-MCNC: 2 MG/DL (ref 7–18)
CALCIUM BLD-MCNC: 7.4 MG/DL (ref 8.5–10.1)
CHLORIDE SERPL-SCNC: 111 MMOL/L (ref 98–112)
CO2 SERPL-SCNC: 19 MMOL/L (ref 21–32)
CORTIS SERPL-MCNC: 17.6 UG/DL
CREAT BLD-MCNC: 0.33 MG/DL
DEPRECATED HBV CORE AB SER IA-ACNC: 246.5 NG/ML
EOSINOPHIL # BLD AUTO: 0.03 X10(3) UL (ref 0–0.7)
EOSINOPHIL NFR BLD AUTO: 0.9 %
ERYTHROCYTE [DISTWIDTH] IN BLOOD BY AUTOMATED COUNT: 15.9 %
GFR SERPLBLD BASED ON 1.73 SQ M-ARVRAT: 131 ML/MIN/1.73M2 (ref 60–?)
GLOBULIN PLAS-MCNC: 2.7 G/DL (ref 2.8–4.4)
GLUCOSE BLD-MCNC: 91 MG/DL (ref 70–99)
HCT VFR BLD AUTO: 28.8 %
HGB BLD-MCNC: 9.5 G/DL
IMM GRANULOCYTES # BLD AUTO: 0.01 X10(3) UL (ref 0–1)
IMM GRANULOCYTES NFR BLD: 0.3 %
IRON SATN MFR SERPL: 47 %
IRON SERPL-MCNC: 88 UG/DL
LYMPHOCYTES # BLD AUTO: 1.04 X10(3) UL (ref 1–4)
LYMPHOCYTES NFR BLD AUTO: 31.7 %
MAGNESIUM SERPL-MCNC: 2 MG/DL (ref 1.6–2.6)
MCH RBC QN AUTO: 34.4 PG (ref 26–34)
MCHC RBC AUTO-ENTMCNC: 33 G/DL (ref 31–37)
MCV RBC AUTO: 104.3 FL
MONOCYTES # BLD AUTO: 0.61 X10(3) UL (ref 0.1–1)
MONOCYTES NFR BLD AUTO: 18.6 %
NEUTROPHILS # BLD AUTO: 1.58 X10 (3) UL (ref 1.5–7.7)
NEUTROPHILS # BLD AUTO: 1.58 X10(3) UL (ref 1.5–7.7)
NEUTROPHILS NFR BLD AUTO: 48.2 %
OSMOLALITY SERPL CALC.SUM OF ELEC: 284 MOSM/KG (ref 275–295)
PHOSPHATE SERPL-MCNC: 2.6 MG/DL (ref 2.5–4.9)
PLATELET # BLD AUTO: 139 10(3)UL (ref 150–450)
POTASSIUM SERPL-SCNC: 2.9 MMOL/L (ref 3.5–5.1)
POTASSIUM SERPL-SCNC: 3.1 MMOL/L (ref 3.5–5.1)
POTASSIUM SERPL-SCNC: 3.8 MMOL/L (ref 3.5–5.1)
PROT SERPL-MCNC: 4.8 G/DL (ref 6.4–8.2)
RBC # BLD AUTO: 2.76 X10(6)UL
SODIUM SERPL-SCNC: 139 MMOL/L (ref 136–145)
TIBC SERPL-MCNC: 188 UG/DL (ref 240–450)
TRANSFERRIN SERPL-MCNC: 126 MG/DL (ref 200–360)
WBC # BLD AUTO: 3.3 X10(3) UL (ref 4–11)

## 2023-06-16 PROCEDURE — 93975 VASCULAR STUDY: CPT | Performed by: FAMILY MEDICINE

## 2023-06-16 PROCEDURE — 99232 SBSQ HOSP IP/OBS MODERATE 35: CPT | Performed by: INTERNAL MEDICINE

## 2023-06-16 PROCEDURE — 99233 SBSQ HOSP IP/OBS HIGH 50: CPT | Performed by: SURGERY

## 2023-06-16 RX ORDER — POTASSIUM CHLORIDE 14.9 MG/ML
20 INJECTION INTRAVENOUS ONCE
Status: COMPLETED | OUTPATIENT
Start: 2023-06-16 | End: 2023-06-16

## 2023-06-16 RX ORDER — ALBUMIN (HUMAN) 12.5 G/50ML
25 SOLUTION INTRAVENOUS ONCE
Status: COMPLETED | OUTPATIENT
Start: 2023-06-16 | End: 2023-06-16

## 2023-06-16 NOTE — PLAN OF CARE
Assumed care at 0730  A/oX4, RA  Per nightshift RN SBP <80, rechecked SBP >90, pt reports \"dizziness\" & \"feeling tired\" since admission, MD aware, staff will continue to monitor   [address], NSR  NPO  denies N/V & reports posterior back pain, denies intervention  Jaundice  [email or web address]  IV zosyn, K replaced  [place], voiding  All needs met at this time      Problem: Patient/Family Goals  Goal: Patient/Family Long Term Goal  Description: Patient's Long Term Goal: discharge    Interventions:  - medications  - See additional Care Plan goals for specific interventions  Outcome: Progressing  Goal: Patient/Family Short Term Goal  Description: Patient's Short Term Goal: safety    Interventions:   - frequent rounding  - See additional Care Plan goals for specific interventions  Outcome: Progressing     Problem: RISK FOR INFECTION - ADULT  Goal: Absence of fever/infection during anticipated neutropenic period  Description: INTERVENTIONS  - Monitor WBC  - Administer growth factors as ordered  - Implement neutropenic guidelines  Outcome: Progressing     Problem: SAFETY ADULT - FALL  Goal: Free from fall injury  Description: INTERVENTIONS:  - Assess pt frequently for physical needs  - Identify cognitive and physical deficits and behaviors that affect risk of falls.   - [place] fall precautions as indicated by assessment.  - Educate pt/family on patient safety including physical limitations  - Instruct pt to call for assistance with activity based on assessment  - Modify environment to reduce risk of injury  - Provide assistive devices as appropriate  - Consider OT/PT consult to assist with strengthening/mobility  - Encourage toileting schedule  Outcome: Progressing     Problem: DISCHARGE PLANNING  Goal: Discharge to home or other facility with appropriate resources  Description: INTERVENTIONS:  - Identify barriers to discharge w/pt and caregiver  - Include patient/family/discharge partner in discharge planning  - Arrange for needed discharge resources and transportation as appropriate  - Identify discharge learning needs (meds, wound care, etc)  - Arrange for interpreters to assist at discharge as needed  - Consider post-discharge preferences of patient/family/discharge partner  - Complete POLST form as appropriate  - Assess patient's ability to be responsible for managing their own health  - Refer to Case Management Department for coordinating discharge planning if the patient needs post-hospital services based on physician/LIP order or complex needs related to functional status, cognitive ability or social support system  Outcome: Progressing

## 2023-06-16 NOTE — PLAN OF CARE
Assumed care of 38 y/o female @1  A/Ox4, RA, NS-Tele, NS bolus given for   hypotension, MD notified  K replaced; redraw was 3.3 @2000  Low fiber diet; NPO @midnight  CIWA's Q2, denies pain, up SBA  RAC PIV-infusing 0.9 @125 ml/hr  Safety prec maintained and all needs met          Problem: RISK FOR INFECTION - ADULT  Goal: Absence of fever/infection during anticipated neutropenic period  Description: INTERVENTIONS  - Monitor WBC  - Administer growth factors as ordered  - Implement neutropenic guidelines  Outcome: Progressing     Problem: SAFETY ADULT - FALL  Goal: Free from fall injury  Description: INTERVENTIONS:  - Assess pt frequently for physical needs  - Identify cognitive and physical deficits and behaviors that affect risk of falls.   - [place] fall precautions as indicated by assessment.  - Educate pt/family on patient safety including physical limitations  - Instruct pt to call for assistance with activity based on assessment  - Modify environment to reduce risk of injury  - Provide assistive devices as appropriate  - Consider OT/PT consult to assist with strengthening/mobility  - Encourage toileting schedule  Outcome: Progressing

## 2023-06-17 ENCOUNTER — APPOINTMENT (OUTPATIENT)
Dept: NUCLEAR MEDICINE | Facility: HOSPITAL | Age: 43
End: 2023-06-17
Attending: SURGERY
Payer: COMMERCIAL

## 2023-06-17 LAB
ACTIN SMOOTH MUSCLE AB: 2 UNITS
ALBUMIN SERPL-MCNC: 2.4 G/DL (ref 3.4–5)
ALBUMIN/GLOB SERPL: 0.9 {RATIO} (ref 1–2)
ALP LIVER SERPL-CCNC: 125 U/L
ALT SERPL-CCNC: 56 U/L
ANION GAP SERPL CALC-SCNC: 8 MMOL/L (ref 0–18)
AST SERPL-CCNC: 64 U/L (ref 15–37)
BASOPHILS # BLD AUTO: 0.02 X10(3) UL (ref 0–0.2)
BASOPHILS NFR BLD AUTO: 0.6 %
BILIRUB SERPL-MCNC: 3.5 MG/DL (ref 0.1–2)
BUN BLD-MCNC: 2 MG/DL (ref 7–18)
CALCIUM BLD-MCNC: 8.2 MG/DL (ref 8.5–10.1)
CHLORIDE SERPL-SCNC: 114 MMOL/L (ref 98–112)
CO2 SERPL-SCNC: 17 MMOL/L (ref 21–32)
CREAT BLD-MCNC: 0.23 MG/DL
EOSINOPHIL # BLD AUTO: 0.05 X10(3) UL (ref 0–0.7)
EOSINOPHIL NFR BLD AUTO: 1.4 %
ERYTHROCYTE [DISTWIDTH] IN BLOOD BY AUTOMATED COUNT: 16.1 %
GFR SERPLBLD BASED ON 1.73 SQ M-ARVRAT: 143 ML/MIN/1.73M2 (ref 60–?)
GLOBULIN PLAS-MCNC: 2.6 G/DL (ref 2.8–4.4)
GLUCOSE BLD-MCNC: 90 MG/DL (ref 70–99)
HCT VFR BLD AUTO: 27.9 %
HGB BLD-MCNC: 9.3 G/DL
IMM GRANULOCYTES # BLD AUTO: 0.01 X10(3) UL (ref 0–1)
IMM GRANULOCYTES NFR BLD: 0.3 %
LYMPHOCYTES # BLD AUTO: 1.02 X10(3) UL (ref 1–4)
LYMPHOCYTES NFR BLD AUTO: 29.1 %
M2 MITOCHONDRIAL AB: <20 UNITS
MCH RBC QN AUTO: 35.4 PG (ref 26–34)
MCHC RBC AUTO-ENTMCNC: 33.3 G/DL (ref 31–37)
MCV RBC AUTO: 106.1 FL
MONOCYTES # BLD AUTO: 0.7 X10(3) UL (ref 0.1–1)
MONOCYTES NFR BLD AUTO: 20 %
NEUTROPHILS # BLD AUTO: 1.7 X10 (3) UL (ref 1.5–7.7)
NEUTROPHILS # BLD AUTO: 1.7 X10(3) UL (ref 1.5–7.7)
NEUTROPHILS NFR BLD AUTO: 48.6 %
OSMOLALITY SERPL CALC.SUM OF ELEC: 284 MOSM/KG (ref 275–295)
PLATELET # BLD AUTO: 132 10(3)UL (ref 150–450)
POTASSIUM SERPL-SCNC: 3.6 MMOL/L (ref 3.5–5.1)
PROT SERPL-MCNC: 5 G/DL (ref 6.4–8.2)
RBC # BLD AUTO: 2.63 X10(6)UL
SODIUM SERPL-SCNC: 139 MMOL/L (ref 136–145)
WBC # BLD AUTO: 3.5 X10(3) UL (ref 4–11)

## 2023-06-17 PROCEDURE — 78227 HEPATOBIL SYST IMAGE W/DRUG: CPT | Performed by: SURGERY

## 2023-06-17 PROCEDURE — 99232 SBSQ HOSP IP/OBS MODERATE 35: CPT | Performed by: STUDENT IN AN ORGANIZED HEALTH CARE EDUCATION/TRAINING PROGRAM

## 2023-06-17 RX ORDER — MIDODRINE HYDROCHLORIDE 5 MG/1
5 TABLET ORAL 3 TIMES DAILY
Status: DISCONTINUED | OUTPATIENT
Start: 2023-06-17 | End: 2023-06-17

## 2023-06-17 RX ORDER — ACETAMINOPHEN 500 MG
500 TABLET ORAL EVERY 6 HOURS PRN
Status: DISCONTINUED | OUTPATIENT
Start: 2023-06-17 | End: 2023-06-21

## 2023-06-17 RX ORDER — KETOROLAC TROMETHAMINE 30 MG/ML
30 INJECTION, SOLUTION INTRAMUSCULAR; INTRAVENOUS ONCE
Status: COMPLETED | OUTPATIENT
Start: 2023-06-17 | End: 2023-06-17

## 2023-06-17 RX ORDER — MORPHINE SULFATE 2 MG/ML
1.9 INJECTION, SOLUTION INTRAMUSCULAR; INTRAVENOUS EVERY 4 HOURS PRN
Status: DISCONTINUED | OUTPATIENT
Start: 2023-06-17 | End: 2023-06-17

## 2023-06-17 RX ORDER — ACETAMINOPHEN 500 MG
1000 TABLET ORAL EVERY 6 HOURS PRN
Status: DISCONTINUED | OUTPATIENT
Start: 2023-06-17 | End: 2023-06-21

## 2023-06-17 NOTE — PLAN OF CARE
Pt. A&O x4, on RA, NSR on tele. Up with standby. Hypotensive but asymptomaticm MD paged, Albumin ordered and given. Pt. With no improvement, MD paged gain, Midodrine ordered and given with good affect this time. Pt. C/o left flank pain and some abdominal tenderness, order obtained for Tylenol, given with good affect. 0.9 infusing at 75/hr. Fall and safety precautions in place. Will continue to monitor. Problem: RISK FOR INFECTION - ADULT  Goal: Absence of fever/infection during anticipated neutropenic period  Description: INTERVENTIONS  - Monitor WBC  - Administer growth factors as ordered  - Implement neutropenic guidelines  Outcome: Progressing     Problem: SAFETY ADULT - FALL  Goal: Free from fall injury  Description: INTERVENTIONS:  - Assess pt frequently for physical needs  - Identify cognitive and physical deficits and behaviors that affect risk of falls.   - [place] fall precautions as indicated by assessment.  - Educate pt/family on patient safety including physical limitations  - Instruct pt to call for assistance with activity based on assessment  - Modify environment to reduce risk of injury  - Provide assistive devices as appropriate  - Consider OT/PT consult to assist with strengthening/mobility  - Encourage toileting schedule  Outcome: Progressing     Problem: DISCHARGE PLANNING  Goal: Discharge to home or other facility with appropriate resources  Description: INTERVENTIONS:  - Identify barriers to discharge w/pt and caregiver  - Include patient/family/discharge partner in discharge planning  - Arrange for needed discharge resources and transportation as appropriate  - Identify discharge learning needs (meds, wound care, etc)  - Arrange for interpreters to assist at discharge as needed  - Consider post-discharge preferences of patient/family/discharge partner  - Complete POLST form as appropriate  - Assess patient's ability to be responsible for managing their own health  - Refer to Case Management Department for coordinating discharge planning if the patient needs post-hospital services based on physician/LIP order or complex needs related to functional status, cognitive ability or social support system  Outcome: Progressing

## 2023-06-17 NOTE — PLAN OF CARE
Pt alert and oriented x 4   Assumed pt care at 0730  RA   Tele- NSR; SB  Low fiber soft Diet  Pt up ad santosh  Denies dizziness  PIV Right AC 0.9NS at 100ml/hr  Non-cardiac Electrolyte Protocol  Tylenol and Toradol for LUQ/flank pain  Zofran for nausea  HIDA scan ordered today     Problem: RISK FOR INFECTION - ADULT  Goal: Absence of fever/infection during anticipated neutropenic period  Description: INTERVENTIONS  - Monitor WBC  - Administer growth factors as ordered  - Implement neutropenic guidelines  Outcome: Progressing     Problem: SAFETY ADULT - FALL  Goal: Free from fall injury  Description: INTERVENTIONS:  - Assess pt frequently for physical needs  - Identify cognitive and physical deficits and behaviors that affect risk of falls.   - [place] fall precautions as indicated by assessment.  - Educate pt/family on patient safety including physical limitations  - Instruct pt to call for assistance with activity based on assessment  - Modify environment to reduce risk of injury  - Provide assistive devices as appropriate  - Consider OT/PT consult to assist with strengthening/mobility  - Encourage toileting schedule  Outcome: Progressing     Problem: DISCHARGE PLANNING  Goal: Discharge to home or other facility with appropriate resources  Description: INTERVENTIONS:  - Identify barriers to discharge w/pt and caregiver  - Include patient/family/discharge partner in discharge planning  - Arrange for needed discharge resources and transportation as appropriate  - Identify discharge learning needs (meds, wound care, etc)  - Arrange for interpreters to assist at discharge as needed  - Consider post-discharge preferences of patient/family/discharge partner  - Complete POLST form as appropriate  - Assess patient's ability to be responsible for managing their own health  - Refer to Case Management Department for coordinating discharge planning if the patient needs post-hospital services based on physician/LIP order or complex needs related to functional status, cognitive ability or social support system  Outcome: Progressing

## 2023-06-18 LAB
ALBUMIN SERPL-MCNC: 2.1 G/DL (ref 3.4–5)
ALBUMIN/GLOB SERPL: 0.9 {RATIO} (ref 1–2)
ALP LIVER SERPL-CCNC: 130 U/L
ALT SERPL-CCNC: 52 U/L
ANION GAP SERPL CALC-SCNC: 5 MMOL/L (ref 0–18)
AST SERPL-CCNC: 68 U/L (ref 15–37)
BASOPHILS # BLD AUTO: 0.02 X10(3) UL (ref 0–0.2)
BASOPHILS NFR BLD AUTO: 0.6 %
BILIRUB SERPL-MCNC: 3.4 MG/DL (ref 0.1–2)
BUN BLD-MCNC: 2 MG/DL (ref 7–18)
CALCIUM BLD-MCNC: 8.3 MG/DL (ref 8.5–10.1)
CHLORIDE SERPL-SCNC: 116 MMOL/L (ref 98–112)
CO2 SERPL-SCNC: 21 MMOL/L (ref 21–32)
CREAT BLD-MCNC: 0.22 MG/DL
EOSINOPHIL # BLD AUTO: 0.07 X10(3) UL (ref 0–0.7)
EOSINOPHIL NFR BLD AUTO: 1.9 %
ERYTHROCYTE [DISTWIDTH] IN BLOOD BY AUTOMATED COUNT: 16.6 %
GFR SERPLBLD BASED ON 1.73 SQ M-ARVRAT: 144 ML/MIN/1.73M2 (ref 60–?)
GLOBULIN PLAS-MCNC: 2.4 G/DL (ref 2.8–4.4)
GLUCOSE BLD-MCNC: 89 MG/DL (ref 70–99)
HCT VFR BLD AUTO: 27.6 %
HGB BLD-MCNC: 9 G/DL
IMM GRANULOCYTES # BLD AUTO: 0.01 X10(3) UL (ref 0–1)
IMM GRANULOCYTES NFR BLD: 0.3 %
LYMPHOCYTES # BLD AUTO: 1.14 X10(3) UL (ref 1–4)
LYMPHOCYTES NFR BLD AUTO: 31.8 %
MCH RBC QN AUTO: 34.7 PG (ref 26–34)
MCHC RBC AUTO-ENTMCNC: 32.6 G/DL (ref 31–37)
MCV RBC AUTO: 106.6 FL
MONOCYTES # BLD AUTO: 0.64 X10(3) UL (ref 0.1–1)
MONOCYTES NFR BLD AUTO: 17.8 %
NEUTROPHILS # BLD AUTO: 1.71 X10 (3) UL (ref 1.5–7.7)
NEUTROPHILS # BLD AUTO: 1.71 X10(3) UL (ref 1.5–7.7)
NEUTROPHILS NFR BLD AUTO: 47.6 %
OSMOLALITY SERPL CALC.SUM OF ELEC: 290 MOSM/KG (ref 275–295)
PLATELET # BLD AUTO: 146 10(3)UL (ref 150–450)
POTASSIUM SERPL-SCNC: 3.5 MMOL/L (ref 3.5–5.1)
PROT SERPL-MCNC: 4.5 G/DL (ref 6.4–8.2)
RBC # BLD AUTO: 2.59 X10(6)UL
SODIUM SERPL-SCNC: 142 MMOL/L (ref 136–145)
WBC # BLD AUTO: 3.6 X10(3) UL (ref 4–11)

## 2023-06-18 PROCEDURE — 99233 SBSQ HOSP IP/OBS HIGH 50: CPT | Performed by: STUDENT IN AN ORGANIZED HEALTH CARE EDUCATION/TRAINING PROGRAM

## 2023-06-18 RX ORDER — SIMETHICONE 80 MG
160 TABLET,CHEWABLE ORAL 4 TIMES DAILY PRN
Status: DISCONTINUED | OUTPATIENT
Start: 2023-06-18 | End: 2023-06-21

## 2023-06-18 RX ORDER — SIMETHICONE 80 MG
80 TABLET,CHEWABLE ORAL 4 TIMES DAILY PRN
Status: DISCONTINUED | OUTPATIENT
Start: 2023-06-18 | End: 2023-06-21

## 2023-06-18 RX ORDER — MAGNESIUM HYDROXIDE/ALUMINUM HYDROXICE/SIMETHICONE 120; 1200; 1200 MG/30ML; MG/30ML; MG/30ML
30 SUSPENSION ORAL 4 TIMES DAILY PRN
Status: DISCONTINUED | OUTPATIENT
Start: 2023-06-18 | End: 2023-06-21

## 2023-06-18 NOTE — PLAN OF CARE
Pt alert and oriented x 4   Assumed pt care at 0730  RA   Tele- NSR; SB  Low fiber soft Diet  Pt up ad santosh  Denies dizziness  PIV Right AC saline locked  Non-cardiac Electrolyte Protocol  Tylenol for pain    Problem: DISCHARGE PLANNING  Goal: Discharge to home or other facility with appropriate resources  Description: INTERVENTIONS:  - Identify barriers to discharge w/pt and caregiver  - Include patient/family/discharge partner in discharge planning  - Arrange for needed discharge resources and transportation as appropriate  - Identify discharge learning needs (meds, wound care, etc)  - Arrange for interpreters to assist at discharge as needed  - Consider post-discharge preferences of patient/family/discharge partner  - Complete POLST form as appropriate  - Assess patient's ability to be responsible for managing their own health  - Refer to Case Management Department for coordinating discharge planning if the patient needs post-hospital services based on physician/LIP order or complex needs related to functional status, cognitive ability or social support system  Outcome: Progressing     Problem: SAFETY ADULT - FALL  Goal: Free from fall injury  Description: INTERVENTIONS:  - Assess pt frequently for physical needs  - Identify cognitive and physical deficits and behaviors that affect risk of falls.   - [place] fall precautions as indicated by assessment.  - Educate pt/family on patient safety including physical limitations  - Instruct pt to call for assistance with activity based on assessment  - Modify environment to reduce risk of injury  - Provide assistive devices as appropriate  - Consider OT/PT consult to assist with strengthening/mobility  - Encourage toileting schedule  Outcome: Progressing     Problem: RISK FOR INFECTION - ADULT  Goal: Absence of fever/infection during anticipated neutropenic period  Description: INTERVENTIONS  - Monitor WBC  - Administer growth factors as ordered  - Implement neutropenic guidelines  Outcome: Progressing

## 2023-06-18 NOTE — PLAN OF CARE
Pt. A&O x4, on RA, SB on tele, BP improved. No c/o pain. Tolerating diet. VSS. Up ad santosh. 0.9 infusing at 100/hr. Fall and safety precautions in place. Will continue to monitor. Problem: RISK FOR INFECTION - ADULT  Goal: Absence of fever/infection during anticipated neutropenic period  Description: INTERVENTIONS  - Monitor WBC  - Administer growth factors as ordered  - Implement neutropenic guidelines  Outcome: Progressing     Problem: SAFETY ADULT - FALL  Goal: Free from fall injury  Description: INTERVENTIONS:  - Assess pt frequently for physical needs  - Identify cognitive and physical deficits and behaviors that affect risk of falls.   - [place] fall precautions as indicated by assessment.  - Educate pt/family on patient safety including physical limitations  - Instruct pt to call for assistance with activity based on assessment  - Modify environment to reduce risk of injury  - Provide assistive devices as appropriate  - Consider OT/PT consult to assist with strengthening/mobility  - Encourage toileting schedule  Outcome: Progressing     Problem: DISCHARGE PLANNING  Goal: Discharge to home or other facility with appropriate resources  Description: INTERVENTIONS:  - Identify barriers to discharge w/pt and caregiver  - Include patient/family/discharge partner in discharge planning  - Arrange for needed discharge resources and transportation as appropriate  - Identify discharge learning needs (meds, wound care, etc)  - Arrange for interpreters to assist at discharge as needed  - Consider post-discharge preferences of patient/family/discharge partner  - Complete POLST form as appropriate  - Assess patient's ability to be responsible for managing their own health  - Refer to Case Management Department for coordinating discharge planning if the patient needs post-hospital services based on physician/LIP order or complex needs related to functional status, cognitive ability or social support system  Outcome: Progressing

## 2023-06-19 LAB
ALBUMIN SERPL-MCNC: 2.1 G/DL (ref 3.4–5)
ALBUMIN/GLOB SERPL: 0.8 {RATIO} (ref 1–2)
ALP LIVER SERPL-CCNC: 142 U/L
ALT SERPL-CCNC: 49 U/L
ANION GAP SERPL CALC-SCNC: 7 MMOL/L (ref 0–18)
AST SERPL-CCNC: 63 U/L (ref 15–37)
BILIRUB SERPL-MCNC: 3.6 MG/DL (ref 0.1–2)
BUN BLD-MCNC: 1 MG/DL (ref 7–18)
CALCIUM BLD-MCNC: 8.1 MG/DL (ref 8.5–10.1)
CHLORIDE SERPL-SCNC: 115 MMOL/L (ref 98–112)
CK SERPL-CCNC: 23 U/L
CO2 SERPL-SCNC: 21 MMOL/L (ref 21–32)
CREAT BLD-MCNC: 0.16 MG/DL
DEPRECATED HBV CORE AB SER IA-ACNC: 150 NG/ML
ERYTHROCYTE [DISTWIDTH] IN BLOOD BY AUTOMATED COUNT: 15.9 %
GFR SERPLBLD BASED ON 1.73 SQ M-ARVRAT: 156 ML/MIN/1.73M2 (ref 60–?)
GLOBULIN PLAS-MCNC: 2.5 G/DL (ref 2.8–4.4)
GLUCOSE BLD-MCNC: 79 MG/DL (ref 70–99)
HCT VFR BLD AUTO: 27.1 %
HGB BLD-MCNC: 8.9 G/DL
HGB RETIC QN AUTO: 35 PG (ref 28.2–36.6)
IGG SUBCLASS 1: 286 MG/DL
IGG SUBCLASS 2: 306 MG/DL
IGG SUBCLASS 3: 39 MG/DL
IGG SUBCLASS 4: 32 MG/DL
IMM RETICS NFR: 0.21 RATIO (ref 0.1–0.3)
IRON SATN MFR SERPL: 36 %
IRON SERPL-MCNC: 68 UG/DL
MCH RBC QN AUTO: 35 PG (ref 26–34)
MCHC RBC AUTO-ENTMCNC: 32.8 G/DL (ref 31–37)
MCV RBC AUTO: 106.7 FL
OSMOLALITY SERPL CALC.SUM OF ELEC: 291 MOSM/KG (ref 275–295)
PLATELET # BLD AUTO: 146 10(3)UL (ref 150–450)
POTASSIUM SERPL-SCNC: 3.2 MMOL/L (ref 3.5–5.1)
PROT SERPL-MCNC: 4.6 G/DL (ref 6.4–8.2)
RBC # BLD AUTO: 2.54 X10(6)UL
RETICS # AUTO: 109.6 X10(3) UL (ref 22.5–147.5)
RETICS/RBC NFR AUTO: 4.4 %
SODIUM SERPL-SCNC: 143 MMOL/L (ref 136–145)
TIBC SERPL-MCNC: 189 UG/DL (ref 240–450)
TRANSFERRIN SERPL-MCNC: 127 MG/DL (ref 200–360)
WBC # BLD AUTO: 4 X10(3) UL (ref 4–11)

## 2023-06-19 PROCEDURE — 99232 SBSQ HOSP IP/OBS MODERATE 35: CPT | Performed by: STUDENT IN AN ORGANIZED HEALTH CARE EDUCATION/TRAINING PROGRAM

## 2023-06-19 RX ORDER — POTASSIUM CHLORIDE 20 MEQ/1
40 TABLET, EXTENDED RELEASE ORAL ONCE
Status: COMPLETED | OUTPATIENT
Start: 2023-06-19 | End: 2023-06-19

## 2023-06-19 RX ORDER — DIPHENHYDRAMINE HCL 25 MG
25 CAPSULE ORAL EVERY 6 HOURS PRN
Status: DISCONTINUED | OUTPATIENT
Start: 2023-06-19 | End: 2023-06-21

## 2023-06-19 RX ORDER — FUROSEMIDE 10 MG/ML
20 INJECTION INTRAMUSCULAR; INTRAVENOUS ONCE
Status: COMPLETED | OUTPATIENT
Start: 2023-06-19 | End: 2023-06-19

## 2023-06-19 NOTE — PLAN OF CARE
A&Ox4. On room air, lungs clear with faint crackled to left lower lungs. Denies SHIRA, shortness of breath, or cough. Abdomen soft and nontender, BS active. Denies N/V/D, +flatus, +belching. C/o left flank pain- PRN Tylenol given with positive result. Right AC PIV saline locked. Patient took a shower this evening. NSR, SB on tele. BLE non-pitting edema noted. Up ad santosh, voids. Plan is for patient to have an EGD/Colonoscopy on Tuesday. Needs met. Will monitor. Problem: Patient/Family Goals  Goal: Patient/Family Long Term Goal  Description: Patient's Long Term Goal: to go home    Interventions:  -   - See additional Care Plan goals for specific interventions  6/19/2023 0015 by Ramon Danielson RN  Outcome: Progressing  6/19/2023 0015 by Ramon Danielson RN  Outcome: Progressing  Goal: Patient/Family Short Term Goal  Description: Patient's Short Term Goal: to have the EGD/Colonoscopy    Interventions:   -   - See additional Care Plan goals for specific interventions  6/19/2023 0015 by Ramon Danielson RN  Outcome: Progressing  6/19/2023 0015 by Ramon Danielson RN  Outcome: Progressing     Problem: RISK FOR INFECTION - ADULT  Goal: Absence of fever/infection during anticipated neutropenic period  Description: INTERVENTIONS  - Monitor WBC  - Administer growth factors as ordered  - Implement neutropenic guidelines  6/19/2023 0015 by Ramon Danielson RN  Outcome: Progressing  6/19/2023 0015 by Ramon Danielson RN  Outcome: Progressing     Problem: SAFETY ADULT - FALL  Goal: Free from fall injury  Description: INTERVENTIONS:  - Assess pt frequently for physical needs  - Identify cognitive and physical deficits and behaviors that affect risk of falls.   - [place] fall precautions as indicated by assessment.  - Educate pt/family on patient safety including physical limitations  - Instruct pt to call for assistance with activity based on assessment  - Modify environment to reduce risk of injury  - Provide assistive devices as appropriate  - Consider OT/PT consult to assist with strengthening/mobility  - Encourage toileting schedule  6/19/2023 0015 by Marylen Gola, RN  Outcome: Progressing  6/19/2023 0015 by Marylen Gola, RN  Outcome: Progressing     Problem: DISCHARGE PLANNING  Goal: Discharge to home or other facility with appropriate resources  Description: INTERVENTIONS:  - Identify barriers to discharge w/pt and caregiver  - Include patient/family/discharge partner in discharge planning  - Arrange for needed discharge resources and transportation as appropriate  - Identify discharge learning needs (meds, wound care, etc)  - Arrange for interpreters to assist at discharge as needed  - Consider post-discharge preferences of patient/family/discharge partner  - Complete POLST form as appropriate  - Assess patient's ability to be responsible for managing their own health  - Refer to Case Management Department for coordinating discharge planning if the patient needs post-hospital services based on physician/LIP order or complex needs related to functional status, cognitive ability or social support system  6/19/2023 0015 by Marylen Gola, RN  Outcome: Progressing  6/19/2023 0015 by Marylen Gola, RN  Outcome: Progressing

## 2023-06-19 NOTE — PLAN OF CARE
Assumed care at 0700. A&O x 4. On RA tolerating well. Patient up ad santosh. Clear liquid diet till midnight and NPO for EGD and colonoscopy. Prep to start tonight. Consent signed and on the chart. Please see epic flowsheet for more detail. Problem: Patient/Family Goals  Goal: Patient/Family Long Term Goal  Description: Patient's Long Term Goal: to go home    Interventions:  -   - See additional Care Plan goals for specific interventions  Outcome: Progressing  Goal: Patient/Family Short Term Goal  Description: Patient's Short Term Goal: to have the EGD/Colonoscopy    Interventions:   -   - See additional Care Plan goals for specific interventions  Outcome: Progressing     Problem: RISK FOR INFECTION - ADULT  Goal: Absence of fever/infection during anticipated neutropenic period  Description: INTERVENTIONS  - Monitor WBC  - Administer growth factors as ordered  - Implement neutropenic guidelines  Outcome: Progressing     Problem: SAFETY ADULT - FALL  Goal: Free from fall injury  Description: INTERVENTIONS:  - Assess pt frequently for physical needs  - Identify cognitive and physical deficits and behaviors that affect risk of falls.   - [place] fall precautions as indicated by assessment.  - Educate pt/family on patient safety including physical limitations  - Instruct pt to call for assistance with activity based on assessment  - Modify environment to reduce risk of injury  - Provide assistive devices as appropriate  - Consider OT/PT consult to assist with strengthening/mobility  - Encourage toileting schedule  Outcome: Progressing     Problem: DISCHARGE PLANNING  Goal: Discharge to home or other facility with appropriate resources  Description: INTERVENTIONS:  - Identify barriers to discharge w/pt and caregiver  - Include patient/family/discharge partner in discharge planning  - Arrange for needed discharge resources and transportation as appropriate  - Identify discharge learning needs (meds, wound care, etc)  - Arrange for interpreters to assist at discharge as needed  - Consider post-discharge preferences of patient/family/discharge partner  - Complete POLST form as appropriate  - Assess patient's ability to be responsible for managing their own health  - Refer to Case Management Department for coordinating discharge planning if the patient needs post-hospital services based on physician/LIP order or complex needs related to functional status, cognitive ability or social support system  Outcome: Progressing

## 2023-06-20 ENCOUNTER — ANESTHESIA EVENT (OUTPATIENT)
Dept: ENDOSCOPY | Facility: HOSPITAL | Age: 43
End: 2023-06-20
Payer: COMMERCIAL

## 2023-06-20 ENCOUNTER — APPOINTMENT (OUTPATIENT)
Dept: GENERAL RADIOLOGY | Facility: HOSPITAL | Age: 43
End: 2023-06-20
Attending: HOSPITALIST
Payer: COMMERCIAL

## 2023-06-20 ENCOUNTER — ANESTHESIA (OUTPATIENT)
Dept: ENDOSCOPY | Facility: HOSPITAL | Age: 43
End: 2023-06-20
Payer: COMMERCIAL

## 2023-06-20 LAB
ALBUMIN SERPL-MCNC: 2.3 G/DL (ref 3.4–5)
ALBUMIN/GLOB SERPL: 0.8 {RATIO} (ref 1–2)
ALP LIVER SERPL-CCNC: 144 U/L
ALT SERPL-CCNC: 49 U/L
ANION GAP SERPL CALC-SCNC: 6 MMOL/L (ref 0–18)
AST SERPL-CCNC: 67 U/L (ref 15–37)
BASOPHILS # BLD AUTO: 0.03 X10(3) UL (ref 0–0.2)
BASOPHILS NFR BLD AUTO: 0.8 %
BILIRUB SERPL-MCNC: 3.6 MG/DL (ref 0.1–2)
BUN BLD-MCNC: 1 MG/DL (ref 7–18)
CALCIUM BLD-MCNC: 8.7 MG/DL (ref 8.5–10.1)
CHLORIDE SERPL-SCNC: 111 MMOL/L (ref 98–112)
CO2 SERPL-SCNC: 23 MMOL/L (ref 21–32)
CREAT BLD-MCNC: 0.34 MG/DL
EOSINOPHIL # BLD AUTO: 0.1 X10(3) UL (ref 0–0.7)
EOSINOPHIL NFR BLD AUTO: 2.6 %
ERYTHROCYTE [DISTWIDTH] IN BLOOD BY AUTOMATED COUNT: 16.4 %
GFR SERPLBLD BASED ON 1.73 SQ M-ARVRAT: 130 ML/MIN/1.73M2 (ref 60–?)
GLOBULIN PLAS-MCNC: 2.8 G/DL (ref 2.8–4.4)
GLUCOSE BLD-MCNC: 77 MG/DL (ref 70–99)
HCT VFR BLD AUTO: 27.6 %
HGB BLD-MCNC: 8.9 G/DL
IMM GRANULOCYTES # BLD AUTO: 0.01 X10(3) UL (ref 0–1)
IMM GRANULOCYTES NFR BLD: 0.3 %
LYMPHOCYTES # BLD AUTO: 1.58 X10(3) UL (ref 1–4)
LYMPHOCYTES NFR BLD AUTO: 40.8 %
MAGNESIUM SERPL-MCNC: 1.9 MG/DL (ref 1.6–2.6)
MCH RBC QN AUTO: 34.8 PG (ref 26–34)
MCHC RBC AUTO-ENTMCNC: 32.2 G/DL (ref 31–37)
MCV RBC AUTO: 107.8 FL
MONOCYTES # BLD AUTO: 0.72 X10(3) UL (ref 0.1–1)
MONOCYTES NFR BLD AUTO: 18.6 %
NEUTROPHILS # BLD AUTO: 1.43 X10 (3) UL (ref 1.5–7.7)
NEUTROPHILS # BLD AUTO: 1.43 X10(3) UL (ref 1.5–7.7)
NEUTROPHILS NFR BLD AUTO: 36.9 %
OSMOLALITY SERPL CALC.SUM OF ELEC: 285 MOSM/KG (ref 275–295)
PLATELET # BLD AUTO: 179 10(3)UL (ref 150–450)
POTASSIUM SERPL-SCNC: 3.8 MMOL/L (ref 3.5–5.1)
POTASSIUM SERPL-SCNC: 3.8 MMOL/L (ref 3.5–5.1)
PROT SERPL-MCNC: 5.1 G/DL (ref 6.4–8.2)
RBC # BLD AUTO: 2.56 X10(6)UL
SODIUM SERPL-SCNC: 140 MMOL/L (ref 136–145)
WBC # BLD AUTO: 3.9 X10(3) UL (ref 4–11)

## 2023-06-20 PROCEDURE — 71046 X-RAY EXAM CHEST 2 VIEWS: CPT | Performed by: HOSPITALIST

## 2023-06-20 PROCEDURE — 0DB78ZX EXCISION OF STOMACH, PYLORUS, VIA NATURAL OR ARTIFICIAL OPENING ENDOSCOPIC, DIAGNOSTIC: ICD-10-PCS | Performed by: STUDENT IN AN ORGANIZED HEALTH CARE EDUCATION/TRAINING PROGRAM

## 2023-06-20 PROCEDURE — 0DBE8ZX EXCISION OF LARGE INTESTINE, VIA NATURAL OR ARTIFICIAL OPENING ENDOSCOPIC, DIAGNOSTIC: ICD-10-PCS | Performed by: STUDENT IN AN ORGANIZED HEALTH CARE EDUCATION/TRAINING PROGRAM

## 2023-06-20 PROCEDURE — 0DBB8ZX EXCISION OF ILEUM, VIA NATURAL OR ARTIFICIAL OPENING ENDOSCOPIC, DIAGNOSTIC: ICD-10-PCS | Performed by: STUDENT IN AN ORGANIZED HEALTH CARE EDUCATION/TRAINING PROGRAM

## 2023-06-20 PROCEDURE — 0DBH8ZZ EXCISION OF CECUM, VIA NATURAL OR ARTIFICIAL OPENING ENDOSCOPIC: ICD-10-PCS | Performed by: STUDENT IN AN ORGANIZED HEALTH CARE EDUCATION/TRAINING PROGRAM

## 2023-06-20 RX ORDER — NALOXONE HYDROCHLORIDE 0.4 MG/ML
80 INJECTION, SOLUTION INTRAMUSCULAR; INTRAVENOUS; SUBCUTANEOUS AS NEEDED
Status: DISCONTINUED | OUTPATIENT
Start: 2023-06-20 | End: 2023-06-20 | Stop reason: HOSPADM

## 2023-06-20 RX ORDER — LIDOCAINE HYDROCHLORIDE 10 MG/ML
INJECTION, SOLUTION EPIDURAL; INFILTRATION; INTRACAUDAL; PERINEURAL AS NEEDED
Status: DISCONTINUED | OUTPATIENT
Start: 2023-06-20 | End: 2023-06-20 | Stop reason: SURG

## 2023-06-20 RX ORDER — SODIUM CHLORIDE, SODIUM LACTATE, POTASSIUM CHLORIDE, CALCIUM CHLORIDE 600; 310; 30; 20 MG/100ML; MG/100ML; MG/100ML; MG/100ML
INJECTION, SOLUTION INTRAVENOUS CONTINUOUS
Status: DISCONTINUED | OUTPATIENT
Start: 2023-06-20 | End: 2023-06-21

## 2023-06-20 RX ADMIN — SODIUM CHLORIDE, SODIUM LACTATE, POTASSIUM CHLORIDE, CALCIUM CHLORIDE: 600; 310; 30; 20 INJECTION, SOLUTION INTRAVENOUS at 12:13:00

## 2023-06-20 RX ADMIN — LIDOCAINE HYDROCHLORIDE 50 MG: 10 INJECTION, SOLUTION EPIDURAL; INFILTRATION; INTRACAUDAL; PERINEURAL at 12:23:00

## 2023-06-20 RX ADMIN — SODIUM CHLORIDE, SODIUM LACTATE, POTASSIUM CHLORIDE, CALCIUM CHLORIDE: 600; 310; 30; 20 INJECTION, SOLUTION INTRAVENOUS at 12:59:00

## 2023-06-20 NOTE — PROGRESS NOTES
Assumed care at 7:30am  Patient is alert and oriented x4. States small amount of pain in abd but doesn't need anything for it. Was NPO since midnight for EGD and colonoscopy. [place] Results in Epic  Will most likely be discharged tomorrow. All safety precautions in place. Will continue to monitor patient.

## 2023-06-20 NOTE — ANESTHESIA POSTPROCEDURE EVALUATION
[facility] Patient Status:  Inpatient   Age/Gender 39year old female MRN [id number]   Location [address] [address] Attending Erika Leigh MD   [facility] Day # 6 PCP Cal Nunez DO       Anesthesia Post-op Note    ESOPHAGOGASTRODUODENOSCOPY (EGD) with biopsies, COLONOSCOPY with cold snare polypectomy and biopsies    Procedure Summary     Date: 06/20/23 Room / Location: [facility] ENDOSCOPY 04 / 1404 [facility] ENDOSCOPY    Anesthesia Start: 9346 Anesthesia Stop: 6352    Procedures:       ESOPHAGOGASTRODUODENOSCOPY (EGD) with biopsies, COLONOSCOPY with cold snare polypectomy and biopsies      COLONOSCOPY Diagnosis: (Mild gastritis, Colon polyp, hemorrhoids)    Surgeons: Cecilio Calvo DO Anesthesiologist: Alex Terrazas MD    Anesthesia Type: MAC ASA Status: 2          Anesthesia Type: MAC    Vitals Value Taken Time   /56 06/20/23 1305   Temp  06/20/23 1308   Pulse 57 06/20/23 1308   Resp 17 06/20/23 1308   SpO2 100 % 06/20/23 1308   Vitals shown include unvalidated device data. Patient Location: Endoscopy    Anesthesia Type: MAC    Airway Patency: patent    Postop Pain Control: adequate    Mental Status: mildly sedated but able to meaningfully participate in the post-anesthesia evaluation    Nausea/Vomiting: none    Cardiopulmonary/Hydration status: stable euvolemic    Complications: no apparent anesthesia related complications    Postop vital signs: stable    Dental Exam: Unchanged from Preop    Patient to be discharged from PACU when criteria met.

## 2023-06-21 VITALS
RESPIRATION RATE: 18 BRPM | HEART RATE: 57 BPM | HEIGHT: 61 IN | DIASTOLIC BLOOD PRESSURE: 74 MMHG | BODY MASS INDEX: 19.35 KG/M2 | TEMPERATURE: 98 F | SYSTOLIC BLOOD PRESSURE: 98 MMHG | WEIGHT: 102.5 LBS | OXYGEN SATURATION: 98 %

## 2023-06-21 LAB
ALBUMIN SERPL-MCNC: 2.5 G/DL (ref 3.4–5)
ALBUMIN/GLOB SERPL: 0.9 {RATIO} (ref 1–2)
ALP LIVER SERPL-CCNC: 169 U/L
ALT SERPL-CCNC: 54 U/L
ANION GAP SERPL CALC-SCNC: 7 MMOL/L (ref 0–18)
AST SERPL-CCNC: 69 U/L (ref 15–37)
BILIRUB SERPL-MCNC: 3.6 MG/DL (ref 0.1–2)
BUN BLD-MCNC: 2 MG/DL (ref 7–18)
CALCIUM BLD-MCNC: 9.1 MG/DL (ref 8.5–10.1)
CHLORIDE SERPL-SCNC: 110 MMOL/L (ref 98–112)
CO2 SERPL-SCNC: 22 MMOL/L (ref 21–32)
CREAT BLD-MCNC: 0.52 MG/DL
GFR SERPLBLD BASED ON 1.73 SQ M-ARVRAT: 117 ML/MIN/1.73M2 (ref 60–?)
GLOBULIN PLAS-MCNC: 2.9 G/DL (ref 2.8–4.4)
GLUCOSE BLD-MCNC: 117 MG/DL (ref 70–99)
OSMOLALITY SERPL CALC.SUM OF ELEC: 285 MOSM/KG (ref 275–295)
POTASSIUM SERPL-SCNC: 3.9 MMOL/L (ref 3.5–5.1)
PROT SERPL-MCNC: 5.4 G/DL (ref 6.4–8.2)
SODIUM SERPL-SCNC: 139 MMOL/L (ref 136–145)

## 2023-06-21 PROCEDURE — 99239 HOSP IP/OBS DSCHRG MGMT >30: CPT | Performed by: HOSPITALIST

## 2023-06-21 RX ORDER — MELATONIN
100 2 TIMES DAILY
Qty: 30 TABLET | Refills: 0 | Status: SHIPPED | OUTPATIENT
Start: 2023-06-21

## 2023-06-21 RX ORDER — FOLIC ACID 1 MG/1
1 TABLET ORAL DAILY
Qty: 30 TABLET | Refills: 0 | Status: SHIPPED | OUTPATIENT
Start: 2023-06-21 | End: 2023-06-22

## 2023-06-21 RX ORDER — FUROSEMIDE 10 MG/ML
20 INJECTION INTRAMUSCULAR; INTRAVENOUS ONCE
Status: COMPLETED | OUTPATIENT
Start: 2023-06-21 | End: 2023-06-21

## 2023-06-21 RX ORDER — FOLIC ACID 1 MG/1
1 TABLET ORAL DAILY
Status: DISCONTINUED | OUTPATIENT
Start: 2023-06-21 | End: 2023-06-21

## 2023-06-21 NOTE — BH PROGRESS NOTE
Met with patient at bedside to discuss what type of alcohol resource she may be looking for. Patient states that she was drinking prior to be admitted to the hospital. Patient reports that she was working from home, bored and somewhat socially isolated so patient admits that she was drinking more. Patient reports that she would have a few drinks a day. Patient has been admitted into the hospital for the past 6 days and has not experienced any withdrawal symptoms since she has been without any alcohol. Patient states that she would like some referrals for outpatient substance treatment though. Patient did express concern as she will not have health insurance after the end of the month. Patient states that she lost her job due to company down sizing and patient expressed concern for not being able to follow-up with appointments. Patient was requesting that writer provide patient with limited healthcare coverage through [facility] for the next few months. Explained to patient that there is no ability for writer to provide patient with health insurance through the hospital. Writer did inquire if patient could [place] her health insurance however patient states that she would not be able to afford the monthly payment to cover [employer]. Writer did state that the hospital may be able to provide some financial assistance depending on income and need. Informed patient that financial assistance information would be placed in patient's discharged instructions along with substance referrals.

## 2023-06-21 NOTE — PLAN OF CARE
NURSING DISCHARGE NOTE    Discharged Home via Wheelchair. Accompanied by Support staff  Belongings Taken by patient/family. Discharge order in place. Pt cleared by hospitalist for discharge. Discharged information provided to patient, verbalized understanding of the discharge plans. IV removed, all questions answered. Pt left unit in stable condition.

## 2023-06-21 NOTE — PROGRESS NOTES
Pt A&Ox4 Room Air  Sitting upright in bed  NSR/SB on Tele  Denies pain at the time  Meds given per Mar  Voids freely  R AC saline locked   to see.    Safety Precaution maintained  Will Continue to monitor

## 2023-06-21 NOTE — CM/SW NOTE
SW met with pt to discuss insurance concerns. Pt states she lost her job about 2 weeks ago and will only have insurance through July 1. Discussed applying for health insurance through [facility] and link added to her dc instructions. Also put in [facility] resources in dc instructions. Provided pt with [employer] Application also per her request.    [facility] liaison met with and provided ETOH resources.     Bladimir Renteria LCSW  /Discharge Planner  '

## 2023-06-22 ENCOUNTER — OFFICE VISIT (OUTPATIENT)
Dept: INTERNAL MEDICINE CLINIC | Facility: CLINIC | Age: 43
End: 2023-06-22
Payer: COMMERCIAL

## 2023-06-22 ENCOUNTER — PATIENT OUTREACH (OUTPATIENT)
Dept: CASE MANAGEMENT | Age: 43
End: 2023-06-22

## 2023-06-22 VITALS
OXYGEN SATURATION: 97 % | HEIGHT: 61 IN | BODY MASS INDEX: 18.69 KG/M2 | TEMPERATURE: 98 F | RESPIRATION RATE: 18 BRPM | HEART RATE: 69 BPM | DIASTOLIC BLOOD PRESSURE: 64 MMHG | SYSTOLIC BLOOD PRESSURE: 90 MMHG | WEIGHT: 99 LBS

## 2023-06-22 DIAGNOSIS — D50.8 IRON DEFICIENCY ANEMIA SECONDARY TO INADEQUATE DIETARY IRON INTAKE: ICD-10-CM

## 2023-06-22 DIAGNOSIS — K21.9 GASTROESOPHAGEAL REFLUX DISEASE, UNSPECIFIED WHETHER ESOPHAGITIS PRESENT: ICD-10-CM

## 2023-06-22 DIAGNOSIS — I95.89 HYPOTENSION DUE TO HYPOVOLEMIA: Primary | ICD-10-CM

## 2023-06-22 DIAGNOSIS — F10.10 ALCOHOL ABUSE: ICD-10-CM

## 2023-06-22 DIAGNOSIS — G62.1 ALCOHOLIC PERIPHERAL NEUROPATHY (HCC): ICD-10-CM

## 2023-06-22 DIAGNOSIS — E87.6 HYPOKALEMIA: ICD-10-CM

## 2023-06-22 DIAGNOSIS — Z02.9 ENCOUNTERS FOR ADMINISTRATIVE PURPOSE: ICD-10-CM

## 2023-06-22 DIAGNOSIS — R79.89 ABNORMAL LFTS: ICD-10-CM

## 2023-06-22 DIAGNOSIS — E80.6 HYPERBILIRUBINEMIA: ICD-10-CM

## 2023-06-22 DIAGNOSIS — E86.1 HYPOTENSION DUE TO HYPOVOLEMIA: Primary | ICD-10-CM

## 2023-06-22 RX ORDER — MIDODRINE HYDROCHLORIDE 2.5 MG/1
2.5 TABLET ORAL 2 TIMES DAILY
Qty: 60 TABLET | Refills: 0 | Status: SHIPPED | OUTPATIENT
Start: 2023-06-22

## 2023-06-22 RX ORDER — PANTOPRAZOLE SODIUM 40 MG/1
40 TABLET, DELAYED RELEASE ORAL
Qty: 30 TABLET | Refills: 0 | Status: SHIPPED | OUTPATIENT
Start: 2023-06-22

## 2023-06-22 RX ORDER — FOLIC ACID 1 MG/1
1 TABLET ORAL DAILY
Qty: 30 TABLET | Refills: 0 | Status: SHIPPED | OUTPATIENT
Start: 2023-06-22

## 2023-06-24 PROBLEM — K21.9 GASTROESOPHAGEAL REFLUX DISEASE: Status: ACTIVE | Noted: 2023-06-24

## 2023-06-29 ENCOUNTER — LAB ENCOUNTER (OUTPATIENT)
Dept: LAB | Age: 43
End: 2023-06-29
Attending: FAMILY MEDICINE
Payer: COMMERCIAL

## 2023-06-29 DIAGNOSIS — F10.10 ALCOHOL ABUSE: ICD-10-CM

## 2023-06-29 LAB
ALBUMIN SERPL-MCNC: 3.3 G/DL (ref 3.4–5)
ALBUMIN/GLOB SERPL: 0.9 {RATIO} (ref 1–2)
ALP LIVER SERPL-CCNC: 155 U/L
ALT SERPL-CCNC: 79 U/L
ANION GAP SERPL CALC-SCNC: 5 MMOL/L (ref 0–18)
AST SERPL-CCNC: 113 U/L (ref 15–37)
BILIRUB SERPL-MCNC: 1.7 MG/DL (ref 0.1–2)
BUN BLD-MCNC: 6 MG/DL (ref 7–18)
CALCIUM BLD-MCNC: 9.9 MG/DL (ref 8.5–10.1)
CHLORIDE SERPL-SCNC: 109 MMOL/L (ref 98–112)
CHOLEST SERPL-MCNC: 289 MG/DL (ref ?–200)
CO2 SERPL-SCNC: 26 MMOL/L (ref 21–32)
CREAT BLD-MCNC: 0.47 MG/DL
FASTING PATIENT LIPID ANSWER: NO
FASTING STATUS PATIENT QL REPORTED: NO
GFR SERPLBLD BASED ON 1.73 SQ M-ARVRAT: 120 ML/MIN/1.73M2 (ref 60–?)
GLOBULIN PLAS-MCNC: 3.8 G/DL (ref 2.8–4.4)
GLUCOSE BLD-MCNC: 91 MG/DL (ref 70–99)
HDLC SERPL-MCNC: 29 MG/DL (ref 40–59)
LDLC SERPL CALC-MCNC: 236 MG/DL (ref ?–100)
NONHDLC SERPL-MCNC: 260 MG/DL (ref ?–130)
OSMOLALITY SERPL CALC.SUM OF ELEC: 287 MOSM/KG (ref 275–295)
POTASSIUM SERPL-SCNC: 4.2 MMOL/L (ref 3.5–5.1)
PROT SERPL-MCNC: 7.1 G/DL (ref 6.4–8.2)
SODIUM SERPL-SCNC: 140 MMOL/L (ref 136–145)
TRIGL SERPL-MCNC: 129 MG/DL (ref 30–149)
VLDLC SERPL CALC-MCNC: 30 MG/DL (ref 0–30)

## 2023-06-29 PROCEDURE — 80061 LIPID PANEL: CPT | Performed by: FAMILY MEDICINE

## 2023-06-29 PROCEDURE — 80053 COMPREHEN METABOLIC PANEL: CPT | Performed by: FAMILY MEDICINE

## 2024-02-01 ENCOUNTER — HOSPITAL ENCOUNTER (INPATIENT)
Facility: HOSPITAL | Age: 44
LOS: 6 days | Discharge: HOME | End: 2024-02-08
Attending: EMERGENCY MEDICINE | Admitting: HOSPITALIST
Payer: MEDICAID

## 2024-02-01 ENCOUNTER — HOSPITAL ENCOUNTER (INPATIENT)
Facility: HOSPITAL | Age: 44
End: 2024-02-01
Attending: EMERGENCY MEDICINE | Admitting: HOSPITALIST
Payer: MEDICAID

## 2024-02-01 ENCOUNTER — APPOINTMENT (OUTPATIENT)
Dept: CT IMAGING | Facility: HOSPITAL | Age: 44
End: 2024-02-01
Attending: EMERGENCY MEDICINE
Payer: MEDICAID

## 2024-02-01 DIAGNOSIS — K21.9 GASTROESOPHAGEAL REFLUX DISEASE, UNSPECIFIED WHETHER ESOPHAGITIS PRESENT: ICD-10-CM

## 2024-02-01 DIAGNOSIS — G62.1 ALCOHOLIC PERIPHERAL NEUROPATHY (HCC): ICD-10-CM

## 2024-02-01 DIAGNOSIS — R18.8 OTHER ASCITES: Primary | ICD-10-CM

## 2024-02-01 DIAGNOSIS — E86.1 HYPOTENSION DUE TO HYPOVOLEMIA: ICD-10-CM

## 2024-02-01 LAB
ALBUMIN SERPL-MCNC: 2.1 G/DL (ref 3.4–5)
ALBUMIN/GLOB SERPL: 0.6 {RATIO} (ref 1–2)
ALP LIVER SERPL-CCNC: 311 U/L
ALT SERPL-CCNC: 54 U/L
ANION GAP SERPL CALC-SCNC: 5 MMOL/L (ref 0–18)
AST SERPL-CCNC: 119 U/L (ref 15–37)
BASOPHILS # BLD AUTO: 0.02 X10(3) UL (ref 0–0.2)
BASOPHILS NFR BLD AUTO: 0.2 %
BILIRUB SERPL-MCNC: 5.1 MG/DL (ref 0.1–2)
BUN BLD-MCNC: 4 MG/DL (ref 9–23)
CALCIUM BLD-MCNC: 8.6 MG/DL (ref 8.5–10.1)
CHLORIDE SERPL-SCNC: 106 MMOL/L (ref 98–112)
CO2 SERPL-SCNC: 28 MMOL/L (ref 21–32)
CREAT BLD-MCNC: 0.36 MG/DL
EGFRCR SERPLBLD CKD-EPI 2021: 128 ML/MIN/1.73M2 (ref 60–?)
EOSINOPHIL # BLD AUTO: 0.01 X10(3) UL (ref 0–0.7)
EOSINOPHIL NFR BLD AUTO: 0.1 %
ERYTHROCYTE [DISTWIDTH] IN BLOOD BY AUTOMATED COUNT: 14.2 %
ETHANOL SERPL-MCNC: <3 MG/DL (ref ?–3)
GLOBULIN PLAS-MCNC: 3.8 G/DL (ref 2.8–4.4)
GLUCOSE BLD-MCNC: 116 MG/DL (ref 70–99)
HCT VFR BLD AUTO: 33.7 %
HGB BLD-MCNC: 10.9 G/DL
IMM GRANULOCYTES # BLD AUTO: 0.06 X10(3) UL (ref 0–1)
IMM GRANULOCYTES NFR BLD: 0.5 %
LIPASE SERPL-CCNC: 19 U/L (ref 13–75)
LYMPHOCYTES # BLD AUTO: 1.43 X10(3) UL (ref 1–4)
LYMPHOCYTES NFR BLD AUTO: 12.9 %
MAGNESIUM SERPL-MCNC: 1.7 MG/DL (ref 1.6–2.6)
MCH RBC QN AUTO: 35.7 PG (ref 26–34)
MCHC RBC AUTO-ENTMCNC: 32.3 G/DL (ref 31–37)
MCV RBC AUTO: 110.5 FL
MONOCYTES # BLD AUTO: 0.63 X10(3) UL (ref 0.1–1)
MONOCYTES NFR BLD AUTO: 5.7 %
NEUTROPHILS # BLD AUTO: 8.94 X10 (3) UL (ref 1.5–7.7)
NEUTROPHILS # BLD AUTO: 8.94 X10(3) UL (ref 1.5–7.7)
NEUTROPHILS NFR BLD AUTO: 80.6 %
OSMOLALITY SERPL CALC.SUM OF ELEC: 286 MOSM/KG (ref 275–295)
PLATELET # BLD AUTO: 139 10(3)UL (ref 150–450)
POTASSIUM SERPL-SCNC: 4 MMOL/L (ref 3.5–5.1)
PROT SERPL-MCNC: 5.9 G/DL (ref 6.4–8.2)
RBC # BLD AUTO: 3.05 X10(6)UL
SODIUM SERPL-SCNC: 139 MMOL/L (ref 136–145)
WBC # BLD AUTO: 11.1 X10(3) UL (ref 4–11)

## 2024-02-01 PROCEDURE — 87040 BLOOD CULTURE FOR BACTERIA: CPT | Performed by: EMERGENCY MEDICINE

## 2024-02-01 PROCEDURE — 74177 CT ABD & PELVIS W/CONTRAST: CPT | Performed by: EMERGENCY MEDICINE

## 2024-02-02 ENCOUNTER — APPOINTMENT (OUTPATIENT)
Dept: ULTRASOUND IMAGING | Facility: HOSPITAL | Age: 44
End: 2024-02-02
Attending: HOSPITALIST
Payer: MEDICAID

## 2024-02-02 PROBLEM — R18.8 OTHER ASCITES: Status: ACTIVE | Noted: 2024-02-02

## 2024-02-02 LAB
ALBUMIN FLD-MCNC: 0.7 G/DL
ALBUMIN SERPL-MCNC: 1.7 G/DL (ref 3.4–5)
ALBUMIN/GLOB SERPL: 0.6 {RATIO} (ref 1–2)
ALP LIVER SERPL-CCNC: 240 U/L
ALT SERPL-CCNC: 42 U/L
ANION GAP SERPL CALC-SCNC: 5 MMOL/L (ref 0–18)
AST SERPL-CCNC: 94 U/L (ref 15–37)
BASOPHILS # BLD AUTO: 0.03 X10(3) UL (ref 0–0.2)
BASOPHILS NFR BLD AUTO: 0.3 %
BASOPHILS NFR PRT: 0 %
BILIRUB SERPL-MCNC: 4 MG/DL (ref 0.1–2)
BILIRUB UR QL CFM: POSITIVE
BUN BLD-MCNC: 4 MG/DL (ref 9–23)
CALCIUM BLD-MCNC: 8.3 MG/DL (ref 8.5–10.1)
CHLORIDE SERPL-SCNC: 108 MMOL/L (ref 98–112)
CLARITY UR REFRACT.AUTO: CLEAR
CO2 SERPL-SCNC: 27 MMOL/L (ref 21–32)
COLOR FLD: YELLOW
CREAT BLD-MCNC: 0.29 MG/DL
EGFRCR SERPLBLD CKD-EPI 2021: 135 ML/MIN/1.73M2 (ref 60–?)
EOSINOPHIL # BLD AUTO: 0.02 X10(3) UL (ref 0–0.7)
EOSINOPHIL NFR BLD AUTO: 0.2 %
EOSINOPHIL NFR PRT: 1 %
ERYTHROCYTE [DISTWIDTH] IN BLOOD BY AUTOMATED COUNT: 14.5 %
FLUAV + FLUBV RNA SPEC NAA+PROBE: NEGATIVE
FLUAV + FLUBV RNA SPEC NAA+PROBE: NEGATIVE
GLOBULIN PLAS-MCNC: 2.9 G/DL (ref 2.8–4.4)
GLUCOSE BLD-MCNC: 83 MG/DL (ref 70–99)
GLUCOSE UR STRIP.AUTO-MCNC: NORMAL MG/DL
HCT VFR BLD AUTO: 27.1 %
HGB BLD-MCNC: 8.9 G/DL
IMM GRANULOCYTES # BLD AUTO: 0.04 X10(3) UL (ref 0–1)
IMM GRANULOCYTES NFR BLD: 0.5 %
INR BLD: 1.31 (ref 0.8–1.2)
KETONES UR STRIP.AUTO-MCNC: 10 MG/DL
LDH FLD L TO P-CCNC: 58 U/L
LEUKOCYTE ESTERASE UR QL STRIP.AUTO: 75
LYMPHOCYTES # BLD AUTO: 1.58 X10(3) UL (ref 1–4)
LYMPHOCYTES NFR BLD AUTO: 18.4 %
LYMPHOCYTES NFR PRT: 65 %
MCH RBC QN AUTO: 35.5 PG (ref 26–34)
MCHC RBC AUTO-ENTMCNC: 32.8 G/DL (ref 31–37)
MCV RBC AUTO: 108 FL
MESOTHL CELL NFR PRT: 13 %
MONOCYTES # BLD AUTO: 0.65 X10(3) UL (ref 0.1–1)
MONOCYTES NFR BLD AUTO: 7.6 %
MONOS+MACROS NFR PRT: 16 %
NEUTROPHILS # BLD AUTO: 6.27 X10 (3) UL (ref 1.5–7.7)
NEUTROPHILS # BLD AUTO: 6.27 X10(3) UL (ref 1.5–7.7)
NEUTROPHILS NFR BLD AUTO: 73 %
NEUTROPHILS PERITONEAL FLUID: 5 %
NITRITE UR QL STRIP.AUTO: NEGATIVE
OSMOLALITY SERPL CALC.SUM OF ELEC: 286 MOSM/KG (ref 275–295)
PH UR STRIP.AUTO: 5.5 [PH] (ref 5–8)
PLATELET # BLD AUTO: 104 10(3)UL (ref 150–450)
POTASSIUM SERPL-SCNC: 2.9 MMOL/L (ref 3.5–5.1)
POTASSIUM SERPL-SCNC: 3.7 MMOL/L (ref 3.5–5.1)
PROT PRT-MCNC: 1.3 G/DL
PROT SERPL-MCNC: 4.6 G/DL (ref 6.4–8.2)
PROT UR STRIP.AUTO-MCNC: 20 MG/DL
PROTHROMBIN TIME: 16.4 SECONDS (ref 11.6–14.8)
RBC # BLD AUTO: 2.51 X10(6)UL
RBC PERITONEAL FLUID: <3000 /MM3
RBC UR QL AUTO: NEGATIVE
RSV RNA SPEC NAA+PROBE: NEGATIVE
SARS-COV-2 RNA RESP QL NAA+PROBE: NOT DETECTED
SODIUM SERPL-SCNC: 140 MMOL/L (ref 136–145)
SP GR UR STRIP.AUTO: 1.02 (ref 1–1.03)
TOTAL CELLS COUNTED FLD: 100
TURBIDITY CSF QL: CLEAR
UROBILINOGEN UR STRIP.AUTO-MCNC: 2 MG/DL
WBC # BLD AUTO: 8.6 X10(3) UL (ref 4–11)
WBC # PRT: 23 /MM3

## 2024-02-02 PROCEDURE — 99223 1ST HOSP IP/OBS HIGH 75: CPT | Performed by: HOSPITALIST

## 2024-02-02 PROCEDURE — 49083 ABD PARACENTESIS W/IMAGING: CPT | Performed by: HOSPITALIST

## 2024-02-02 RX ORDER — ECHINACEA PURPUREA EXTRACT 125 MG
1 TABLET ORAL
Status: DISCONTINUED | OUTPATIENT
Start: 2024-02-02 | End: 2024-02-08

## 2024-02-02 RX ORDER — ACETAMINOPHEN 500 MG
500 TABLET ORAL EVERY 4 HOURS PRN
Status: DISCONTINUED | OUTPATIENT
Start: 2024-02-02 | End: 2024-02-08

## 2024-02-02 RX ORDER — ONDANSETRON 2 MG/ML
4 INJECTION INTRAMUSCULAR; INTRAVENOUS EVERY 6 HOURS PRN
Status: DISCONTINUED | OUTPATIENT
Start: 2024-02-02 | End: 2024-02-08

## 2024-02-02 RX ORDER — KETOROLAC TROMETHAMINE 15 MG/ML
15 INJECTION, SOLUTION INTRAMUSCULAR; INTRAVENOUS ONCE
Status: COMPLETED | OUTPATIENT
Start: 2024-02-02 | End: 2024-02-02

## 2024-02-02 RX ORDER — ENOXAPARIN SODIUM 100 MG/ML
40 INJECTION SUBCUTANEOUS DAILY
Status: DISCONTINUED | OUTPATIENT
Start: 2024-02-02 | End: 2024-02-08

## 2024-02-02 RX ORDER — FOLIC ACID 1 MG/1
1 TABLET ORAL DAILY
Status: DISCONTINUED | OUTPATIENT
Start: 2024-02-02 | End: 2024-02-08

## 2024-02-02 RX ORDER — PROCHLORPERAZINE EDISYLATE 5 MG/ML
5 INJECTION INTRAMUSCULAR; INTRAVENOUS EVERY 8 HOURS PRN
Status: DISCONTINUED | OUTPATIENT
Start: 2024-02-02 | End: 2024-02-08

## 2024-02-02 RX ORDER — ALBUMIN (HUMAN) 12.5 G/50ML
25 SOLUTION INTRAVENOUS ONCE
Status: COMPLETED | OUTPATIENT
Start: 2024-02-02 | End: 2024-02-02

## 2024-02-02 RX ORDER — POTASSIUM CHLORIDE 20 MEQ/1
40 TABLET, EXTENDED RELEASE ORAL EVERY 4 HOURS
Status: COMPLETED | OUTPATIENT
Start: 2024-02-02 | End: 2024-02-02

## 2024-02-02 RX ORDER — MELATONIN
3 NIGHTLY PRN
Status: DISCONTINUED | OUTPATIENT
Start: 2024-02-02 | End: 2024-02-08

## 2024-02-02 RX ORDER — KETOROLAC TROMETHAMINE 30 MG/ML
30 INJECTION, SOLUTION INTRAMUSCULAR; INTRAVENOUS EVERY 6 HOURS PRN
Status: DISCONTINUED | OUTPATIENT
Start: 2024-02-02 | End: 2024-02-03

## 2024-02-02 RX ORDER — KETOROLAC TROMETHAMINE 15 MG/ML
15 INJECTION, SOLUTION INTRAMUSCULAR; INTRAVENOUS EVERY 6 HOURS PRN
Status: DISCONTINUED | OUTPATIENT
Start: 2024-02-02 | End: 2024-02-03

## 2024-02-02 RX ORDER — MIDODRINE HYDROCHLORIDE 2.5 MG/1
2.5 TABLET ORAL 2 TIMES DAILY
Status: DISCONTINUED | OUTPATIENT
Start: 2024-02-02 | End: 2024-02-07

## 2024-02-02 RX ORDER — MIDODRINE HYDROCHLORIDE 2.5 MG/1
2.5 TABLET ORAL 2 TIMES DAILY
Status: DISCONTINUED | OUTPATIENT
Start: 2024-02-02 | End: 2024-02-02

## 2024-02-02 RX ORDER — PANTOPRAZOLE SODIUM 40 MG/1
40 TABLET, DELAYED RELEASE ORAL
Status: DISCONTINUED | OUTPATIENT
Start: 2024-02-02 | End: 2024-02-08

## 2024-02-02 NOTE — PLAN OF CARE
Patient is A/O x4. VSS. Afebrile. Room air. No complaints of pain. Ambulates. Voids. Was NPO for paracentesis. Procedure completed with 1.3L removed. Regular/ soft diet resumed; tolerated well. All medications given per MAR. Albumin infused per order. K+ replaced per protocol. Safety precautions in place. Call light within reach.

## 2024-02-02 NOTE — CONSULTS
[facility]                       Gastroenterology Consultation-[facility] Gastroenterology    Tamy Wall Patient Status:  Inpatient    1980 MRN [id number]   Location [facility] 4NW-A Attending Cuong Pagan MD   Hosp Day # 0 PCP Karoline Burks DO     Reason for consultation: Abdominal Ascites   HPI: Ms. Wall is a 42 y/o with a PMHx of EtOH abuse and hepatitis (DF 28.8) who presents to the ED with reports of abdominal ascites and bloating. She reports noticing abdominal distention on  with notable bloating feeling. She reports discomfort as diffused pressure radiating to back accompanied by poor appetite d/t uncomfortable feeling, reports 10 lbs weight gain over 5 days, she has no prior paracentesis. She denies dysphagia, odynophagia and reports normal bowel habits. Initial labs noted Hgb 10.9 now 8.9 (recent baseline 8-9), elevated LFTs; ALK phos 311-240, -94, normal ALT, Tbili 5.1--4.0. Her LFTs appear to be chronically elevated since 2023. CT a/p note enlarge liver, small amounts of ascites, thickened and distended gallbladder, mild cecum thickening, mild to moderate ascities, associated reactive enhancement of the bowel wall.  She has a hx of heavy  alcohol abuse; mainly wine (last consumption reported 5 mos ago) that started during the COVID pandemic, she attempted to stop drinking last February relapsing in May. She was admitted in 2023 with elevated LFTS. CT a/p noted There is hyperenhancement the gallbladder wall with suspected pericholecystic fluid that extends into the sub accept pains.  No biliary ductal dilatation., diffuse fatty infiltration of the liver/steatosis with mild hepatomegaly, partially calcified ovoid lesion within the right adnexa measuring 3.1 x 4.8 x 2.7 cm.; may either emanate from the right ovary or be exophytic from the right side of the uterus, mild bilateral pleural effusions. Abdominal US at that time revealed hepatic steatosis, layering  sludge in the gallbladder with no CBD dilation. HIDA scan was unremarkable. Hepatic serologies were negative. Bi-directional on 23 with Dr. Sydney Wesley noted mild gastritis and 5mm benign cecal polyp.   PMHx:   Past Medical History:   Diagnosis Date    Fibroid, uterine 2014    7cm , up to 12 cm at end of pregnacy                PSHx:   Past Surgical History:   Procedure Laterality Date    APPENDECTOMY            [facility], fibroids    COLONOSCOPY N/A 2023    Procedure: COLONOSCOPY;  Surgeon: Israel Wesley DO;  Location:  ENDOSCOPY     Medications:    pantoprazole (Protonix) DR tab 40 mg  40 mg Oral QAM AC    folic acid (Folvite) tab 1 mg  1 mg Oral Daily    [COMPLETED] ketorolac (Toradol) 15 MG/ML injection 15 mg  15 mg Intravenous Once    acetaminophen (Tylenol Extra Strength) tab 500 mg  500 mg Oral Q4H PRN    melatonin tab 3 mg  3 mg Oral Nightly PRN    glycerin-hypromellose- (Artifical Tears) 0.2-0.2-1 % ophthalmic solution 1 drop  1 drop Both Eyes QID PRN    sodium chloride (Saline Mist) 0.65 % nasal solution 1 spray  1 spray Each Nare Q3H PRN    enoxaparin (Lovenox) 40 MG/0.4ML SUBQ injection 40 mg  40 mg Subcutaneous Daily    ondansetron (Zofran) 4 MG/2ML injection 4 mg  4 mg Intravenous Q6H PRN    prochlorperazine (Compazine) 10 MG/2ML injection 5 mg  5 mg Intravenous Q8H PRN    ketorolac (Toradol) 15 MG/ML injection 15 mg  15 mg Intravenous Q6H PRN    Or    ketorolac (Toradol) 30 MG/ML injection 30 mg  30 mg Intravenous Q6H PRN    midodrine (ProAmatine) tab 2.5 mg  2.5 mg Oral BID    potassium chloride (K-Dur) tab 40 mEq  40 mEq Oral Q4H    [COMPLETED] iopamidol 76% (ISOVUE-370) injection for power injector  65 mL Intravenous ONCE PRN     Allergies: No Known Allergies  Social HX:   Social History     Socioeconomic History    Marital status:    Tobacco Use    Smoking status: Former     Types: Cigarettes    Smokeless tobacco: Never   Vaping Use     Vaping Use: Never used   Substance and Sexual Activity    Alcohol use: No     Alcohol/week: 0.0 standard drinks of alcohol    Drug use: No     Social Determinants of Health     Food Insecurity: No Food Insecurity (2/2/2024)    Food Insecurity     Food Insecurity: Never true   Transportation Needs: No Transportation Needs (2/2/2024)    Transportation Needs     Lack of Transportation: No   Housing Stability: Low Risk  (2/2/2024)    Housing Stability     Housing Instability: No      FamHx: The patient has no family history of colon cancer or other gastrointestinal malignancies;  No family history of ulcer disease, or inflammatory bowel disease  ROS:  In addition to the pertinent positives described above:            Infectious Disease:  No chronic infections or recent fevers prior to the acute illness            Cardiovascular: No history of CAD, prior MI, chest pain, or palpitations            Respiratory: No shortness of breath, asthma, copd, recurrent pneumonia            Hematologic: The patient reports no easy bruising, frequent gum bleeding or nose bleeding;  The patient has no history of known chronic anemia            Dermatologic: The patient reports no recent rashes or chronic skin disorders            Rheumatologic: The patient reports no history of chronic arthritis, myalgias, arthralgias            Genitourinary:  The patient reports no history of recurrent urinary tract infections, hematuria, dysuria, or nephrolithiasis           Psychiatric: The patient reports no history of depression, anxiety, suicidal ideation, or homicidal ideation           Oncologic: The patient reports no history of prior solid tumor or hematologic malignancy           ENT: The patient reports no hoarseness of voice, hearing loss, sinus congestion, tinnitus           Neurologic: The patient reports no history of seizure, stroke, or frequent headaches  PE: BP 92/65 (BP Location: Left arm)   Pulse 69   Temp 98.5 °F (36.9 °C)  (Oral)   Resp 18   Ht 5' 1\" (1.549 m)   Wt 114 lb (51.7 kg)   LMP 05/28/2023 (Approximate)   SpO2 97%   BMI 21.54 kg/m²   Gen: AAO x 3, able to speak in complete sentences  HENT: EOMI, PERRL, oropharynx is clear with moist mucosal membranes  Eyes: Sclerae are icteric  Neck:  Supple without nuchal rigidity  CV: Regular rate and rhythm, with normal S1 and S2  Resp: Clear to auscultation bilaterally without wheezes; rubs, rhonchi, or rales  Abdomen: Soft, mild tenderness, distended with the presence of bowel sounds; No hepatosplenomegaly; no rebound or guarding; ascites is clinically apparent; no tympany to percussion  Ext: No peripheral edema or cyanosis  Skin: Warm and dry  Psychiatric: Appropriate mood and congruent affect without obvious depression or anxiety  Labs:   Lab Results   Component Value Date    WBC 8.6 02/02/2024    HGB 8.9 02/02/2024    HCT 27.1 02/02/2024    .0 02/02/2024    CREATSERUM 0.29 02/02/2024    BUN 4 02/02/2024     02/02/2024    K 2.9 02/02/2024     02/02/2024    CO2 27.0 02/02/2024    GLU 83 02/02/2024    CA 8.3 02/02/2024    ALB 1.7 02/02/2024    ALKPHO 240 02/02/2024    BILT 4.0 02/02/2024    AST 94 02/02/2024    ALT 42 02/02/2024    LIP 19 02/01/2024    MG 1.7 02/01/2024     Recent Labs   Lab 02/01/24 1956 02/02/24  0511   * 83   BUN 4* 4*   CREATSERUM 0.36* 0.29*   CA 8.6 8.3*    140   K 4.0 2.9*    108   CO2 28.0 27.0     Recent Labs   Lab 02/02/24  0511   RBC 2.51*   HGB 8.9*   HCT 27.1*   .0*   MCH 35.5*   MCHC 32.8   RDW 14.5   NEPRELIM 6.27   WBC 8.6   .0*       Recent Labs   Lab 02/01/24 1956 02/02/24  0511   ALT 54 42   * 94*                   Imaging:   Impression   CONCLUSION:       1. Marked fatty infiltration of the enlarged liver is noted.     2. Small to moderate amount of ascites throughout the abdomen pelvis is noted.     3. An acute inflammatory process is not identified.     4. Calcification in the  right-sided pelvis is stable.     Impression: Ms. Wall is a 42 y/o with a PMHx of EtOH abuse and hepatitis (DF 28.8) who presents to the ED with reports of abdominal ascites and bloating since Sunday. 5lb weight gain over 5 days, chronically elevated LFTs since 6/2023. CT a/p note enlarge liver, small amounts of ascites, thickened and distended gallbladder, mild cecum thickening, mild to moderate ascities, associated reactive enhancement of the bowel wall. Her abdominal ascites likely d/t alcoholic hepatitis vs cholecystitis. Will consider repeat HIDA scan post paracentesis; pending clinical course.     Elevated LFTs- chronically elevated LFTs; ALK phos 311-240, -94, normal ALT Tbili 5.1-4.0.         Hx of EtOH abuse - Alcoholic Hepatitis         PETH serology pending    2.    Abdominal Ascites - Therapeutic paracentesis pending         Albumin 25g replacement    3.     Hypokalemia -K+ 2.7          Electrolyte replacement per hospital protocol          MVI, thiamine Folate    4.     EtOH abuse- EToH <3          PETH pending          Strict Alcohol cessation      Recommendations:     NPO  PPI IV BID  DVT prophylaxis   Therapeutic Paracentesis w/fluid studies  Electrolyte replacement; per hospital protocol  CBC/CMP in am; trending LFT's  MVI; Thiamine, Folate  Pain management/antiemetics   PETH serology-pending  Strict alcohol cessation    Thank you for the consultation, we will follow the patient with you.  Attending addendum Dr. Salinas to follow later today and provide formal, final recommendations at that time   SILVANA Hooper  8:22 AM  2/2/2024  [facility] Gastroenterology  [phone]    GI attending addendum:    I have personally seen and examined this patient and agree with above nurse practitioner's assessment and recommendations. Briefly, 42 y/o female with a h/o heavy alcohol use, recent admission in June for alcoholic hepatitis, still drinking socially admitted with symptomatic tense  ascites, abdominal pressure. On exam, appears uncomfortable, markedly distended abdomen, dullness to percussion, non-tender, neg Acevedo's sign. Labs c/w alcoholic hepatitis. Has GB wall thickening on imaging which appears secondary to hepatitis. Recommend US guided therapeutic paracentesis with fluid analysis, IV albumin replacement. Consider low dose diuretic on dc as tolerated, although she is chronically hypotensive requiring  Midodrine. ETOH cessation discussed.    Krystal Salinas MD  11:25 AM  2/2/2024  [facility] Gastroenterology  [phone]

## 2024-02-02 NOTE — H&P
[facility]     Tamy Wall Patient Status:  Emergency    1980 MRN [id number]   Location [facility] EMERGENCY DEPARTMENT Attending Zena Wise MD   Hosp Day # 0 PCP Karoline Burks DO     Chief Complaint: Abdominal distension    Subjective:    History of Present Illness:     Tamy Wall is a 43 year old female with past medical history significant for EtOH abuse and hepatitis as well as uterine fibroid presents to the ER with complaints of abdominal distention and possible ascites.  Patient states she noticed her abdomen was more distended for the past 5 days.  She also states she gained about 10 pounds.  She also noticed that she was jaundiced.  She states that she recently stopped drinking alcohol.  Her last drink was about 3 months ago.  She was admitted to the hospital in  for EtOH hepatitis and was seen by GI and underwent an EGD.  At that time, her LFTs were also elevated with an elevated bilirubin.  Her Madrey DF score was 13.6 and therefore she was not a candidate for corticosteroids.  Autoimmune workup including NICANOR, ASMA, IgG were negative.  Right upper quadrant Doppler was negative for thrombus.  She was encouraged to stop drinking and discharged home.  She was also found to have gastritis on EGD was DC'd on a PPI twice daily.    History/Other:    Past Medical History:  Past Medical History:   Diagnosis Date    Fibroid, uterine     7cm , up to 12 cm at end of pregnacy     Past Surgical History:   Past Surgical History:   Procedure Laterality Date    APPENDECTOMY            [facility], fibroids    COLONOSCOPY N/A 2023    Procedure: COLONOSCOPY;  Surgeon: Israel Wesley DO;  Location:  ENDOSCOPY      Family History:   Family History   Problem Relation Age of Onset    Heart Disorder Mother     Other (fibroid) Mother      Social History:    reports that she has quit smoking. Her smoking use included cigarettes. She  has never used smokeless tobacco. She reports that she does not drink alcohol and does not use drugs.     Allergies: No Known Allergies    Medications:    No current facility-administered medications on file prior to encounter.     Current Outpatient Medications on File Prior to Encounter   Medication Sig Dispense Refill    pantoprazole 40 MG Oral Tab EC Take 1 tablet (40 mg total) by mouth every morning before breakfast. 30 tablet 0    folic acid 1 MG Oral Tab Take 1 tablet (1 mg total) by mouth daily. 30 tablet 0    sertraline 50 MG Oral Tab 1/2 tab daily x 1 week, then 1 tab daily (Patient not taking: Reported on 2/1/2024) 30 tablet 5    naltrexone 50 MG Oral Tab Take 0.5 tablets (25 mg total) by mouth daily. (Patient not taking: Reported on 2/1/2024) 30 tablet 5    midodrine 2.5 MG Oral Tab Take 1 tablet (2.5 mg total) by mouth 2 (two) times daily. (Patient not taking: Reported on 2/1/2024) 60 tablet 0    thiamine 100 MG Oral Tab Take 1 tablet (100 mg total) by mouth in the morning and 1 tablet (100 mg total) before bedtime. (Patient not taking: Reported on 6/22/2023) 30 tablet 0    Cholecalciferol (VITAMIN D3) 1.25 MG (73664 UT) Oral Cap Take 1 tablet by mouth once a week. (Patient not taking: Reported on 6/22/2023) 12 capsule 0    PRENATAL 27-0.8 MG Oral Tab Take 1 tablet by mouth daily. Indications: Pregnancy (Patient not taking: Reported on 6/22/2023)         Review of Systems:   A comprehensive review of systems was completed.    Pertinent positives and negatives noted in the HPI.    Objective:   Physical Exam:    BP 97/75   Pulse 77   Temp 98.2 °F (36.8 °C) (Temporal)   Resp 16   Ht 5' 1\" (1.549 m)   Wt 114 lb (51.7 kg)   LMP 05/28/2023 (Approximate)   SpO2 99%   BMI 21.54 kg/m²   General: No acute distress, Alert, +icterus  Respiratory: No rhonchi, no wheezes  Cardiovascular: S1, S2. Regular rate and rhythm  Abdomen: Soft, Non-tender, distended, positive bowel sounds  Neuro: No new focal  deficits  Extremities: No edema      Results:    Labs:      Labs Last 24 Hours:    Recent Labs   Lab 02/01/24 1956   RBC 3.05*   HGB 10.9*   HCT 33.7*   .5*   MCH 35.7*   MCHC 32.3   RDW 14.2   NEPRELIM 8.94*   WBC 11.1*   .0*       Recent Labs   Lab 02/01/24 1956   *   BUN 4*   CREATSERUM 0.36*   EGFRCR 128   CA 8.6   ALB 2.1*      K 4.0      CO2 28.0   ALKPHO 311*   *   ALT 54   BILT 5.1*   TP 5.9*       Lab Results   Component Value Date    INR 1.07 06/15/2023       No results for input(s): \"TROP\", \"TROPHS\", \"CK\" in the last 168 hours.    No results for input(s): \"TROP\", \"PBNP\" in the last 168 hours.    No results for input(s): \"PCT\" in the last 168 hours.    Imaging: Imaging data reviewed in Epic.    Assessment & Plan:      #Abdominal distension, ascites likely due to underlying liver cirrhosis  US paracentesis ordered for dx and therapeutic purposes  GI to eval    #Etoh cirrhosis, now with ascites, elevated LFTs and bilirubin, consider steroids  GI to eval  Monitor LFTs    #Etoh abuse, pt states her last drink was 3 months ago    #Hypotension, monitor    #Anemia, thrombocytopenia, due to BM suppression from chronic Etoh abuse        Plan of care discussed with pt and RN.    Cuong Pagan MD    Supplementary Documentation:     The 21st Century Cures Act makes medical notes like these available to patients in the interest of transparency. Please be advised this is a medical document. Medical documents are intended to carry relevant information, facts as evident, and the clinical opinion of the practitioner. The medical note is intended as peer to peer communication and may appear blunt or direct. It is written in medical language and may contain abbreviations or verbiage that are unfamiliar.

## 2024-02-02 NOTE — ED INITIAL ASSESSMENT (HPI)
Patient arrives ambulatory to the ED with abdominal distention. Patient states \"this is ascites\". Started feeling weak about a week ago along with \"gaining weight\". Noticed weight gain of 10 lbs in a few days. Now reports SOB, \"its not a heart problem I feel the fluid pushing up\"

## 2024-02-02 NOTE — ED PROVIDER NOTES
Patient Seen in: [facility] Emergency Department      History     Chief Complaint   Patient presents with    Abdomen/Flank Pain    Difficulty Breathing     Stated Complaint: ascites, hx of fatty liver now having shortness of breath    Subjective:   HPI    Patient is a 43-year-old female with a history of alcoholism presents to ED for evaluation for abdominal discomfort distention for the past 5 days.  10 pound weight gain.  Also noted to be jaundiced.  She states she just stopped drinking alcohol.  Last drink was about 3 months ago.  No previous history of cirrhosis.  States she feels like she has ascites now.    Objective:   Past Medical History:   Diagnosis Date    Fibroid, uterine     7cm , up to 12 cm at end of pregnacy              Past Surgical History:   Procedure Laterality Date    APPENDECTOMY            [facility], fibroids    COLONOSCOPY N/A 2023    Procedure: COLONOSCOPY;  Surgeon: Israel Wesley DO;  Location:  ENDOSCOPY                Social History     Socioeconomic History    Marital status:    Tobacco Use    Smoking status: Former     Types: Cigarettes    Smokeless tobacco: Never   Vaping Use    Vaping Use: Never used   Substance and Sexual Activity    Alcohol use: No     Alcohol/week: 0.0 standard drinks of alcohol    Drug use: No              Review of Systems    Positive for stated complaint: ascites, hx of fatty liver now having shortness of breath  Other systems are as noted in HPI.  Constitutional and vital signs reviewed.      All other systems reviewed and negative except as noted above.    Physical Exam     ED Triage Vitals [24]   /70   Pulse 99   Resp 18   Temp 98.2 °F (36.8 °C)   Temp src Temporal   SpO2 98 %   O2 Device None (Room air)       Current:BP 97/75   Pulse 77   Temp 98.2 °F (36.8 °C) (Temporal)   Resp 16   Ht 154.9 cm (5' 1\")   Wt 51.7 kg   LMP 2023 (Approximate)   SpO2 99%   BMI 21.54 kg/m²          Physical Exam  Vitals and nursing note reviewed.   Constitutional:       General: She is not in acute distress.     Appearance: She is well-developed. She is not toxic-appearing.   HENT:      Head: Normocephalic and atraumatic.   Eyes:      General: Scleral icterus present.      Conjunctiva/sclera: Conjunctivae normal.   Cardiovascular:      Rate and Rhythm: Normal rate.   Pulmonary:      Effort: Pulmonary effort is normal. No respiratory distress.   Abdominal:      General: There is distension.      Tenderness: There is generalized abdominal tenderness.   Musculoskeletal:         General: No tenderness. Normal range of motion.      Cervical back: Normal range of motion and neck supple.   Skin:     General: Skin is warm and dry.      Findings: No rash.   Neurological:      Mental Status: She is alert and oriented to person, place, and time.      Motor: No abnormal muscle tone.      Coordination: Coordination normal.   Psychiatric:         Behavior: Behavior normal.              ED Course     Labs Reviewed   COMP METABOLIC PANEL (14) - Abnormal; Notable for the following components:       Result Value    Glucose 116 (*)     BUN 4 (*)     Creatinine 0.36 (*)      (*)     Alkaline Phosphatase 311 (*)     Bilirubin, Total 5.1 (*)     Total Protein 5.9 (*)     Albumin 2.1 (*)     A/G Ratio 0.6 (*)     All other components within normal limits   CBC W/ DIFFERENTIAL - Abnormal; Notable for the following components:    WBC 11.1 (*)     RBC 3.05 (*)     HGB 10.9 (*)     HCT 33.7 (*)     .0 (*)     .5 (*)     MCH 35.7 (*)     Neutrophil Absolute Prelim 8.94 (*)     Neutrophil Absolute 8.94 (*)     All other components within normal limits   LIPASE - Normal   MAGNESIUM - Normal   ETHYL ALCOHOL - Normal   SARS-COV-2/FLU A AND B/RSV BY PCR (GENEXPERT) - Normal    Narrative:     This test is intended for the qualitative detection and differentiation of SARS-CoV-2, influenza A, influenza B, and  respiratory syncytial virus (RSV) viral RNA in nasopharyngeal or nares swabs from individuals suspected of respiratory viral infection consistent with COVID-19 by their healthcare provider. Signs and symptoms of respiratory viral infection due to SARS-CoV-2, influenza, and RSV can be similar.    Test performed using the Xpert Xpress SARS-CoV-2/FLU/RSV (real time RT-PCR)  assay on the GeneXpert instrument, [employer], [place] [address].   This test is being used under the Food and Drug Administration's Emergency Use Authorization.    The authorized Fact Sheet for Healthcare Providers for this assay is available upon request from the laboratory.   CBC WITH DIFFERENTIAL WITH PLATELET    Narrative:     The following orders were created for panel order CBC With Differential With Platelet.  Procedure                               Abnormality         Status                     ---------                               -----------         ------                     CBC W/ DIFFERENTIAL[[id number]]          Abnormal            Final result                 Please view results for these tests on the individual orders.   URINALYSIS WITH CULTURE REFLEX   ICTOTEST   RAINBOW DRAW BLUE   RAINBOW DRAW BLUE   BLOOD CULTURE   BLOOD CULTURE                       MDM        -Tracing on cardiac monitor and pulse oximetry was reviewed by myself.   -The cardiac monitor revealed normal sinus rhythm as interpreted by me. The cardiac monitor was ordered to monitor the patient for dysrhythmia  -Pulse oximetry was interpreted by me and was normal.  Pulse oximeter was ordered to monitor patient for hypoxia.             -Comorbidities did add complexity to the management are mentioned in the HPI above        -I personally reviewed the prior external notes and the medical record to obtain additional history reviewed discharge summary from June 2023.  Was seen in the hospital for abdominal pain elevated LFTs diagnosed with alcohol  hepatitis.        -DDX: Includes but not limited to  -hepatitis, SBP.                -I personally reviewed the CT findings and it shows ascites  Please refer to radiology report for official interpretation    CT ABDOMEN PELVIS IV CONTRAST, NO ORAL (ER)   Final Result   PROCEDURE:  CT ABDOMEN PELVIS IV CONTRAST, NO ORAL (ER)       COMPARISON:  [facility] , CT, CT ABDOMEN+PELVIS(CONTRAST ONLY)(CPT=74177),    6/15/2023, 8:18 PM.       INDICATIONS:  ascites, hx of fatty liver now having shortness of breath       TECHNIQUE:  CT scanning was performed from the dome of the diaphragm to    the pubic symphysis with non-ionic intravenous contrast material. Post    contrast coronal MPR imaging was performed.  Dose reduction techniques    were used. Dose information is    transmitted to the ACR (American College of Radiology) NRDR (National    Radiology Data Registry) which includes the Dose Index Registry.       PATIENT STATED HISTORY:(As transcribed by Technologist)  Patient has    abdominal distention with weakness, weight gain and shortness of breath.        CONTRAST USED:  65cc of Isovue 370       FINDINGS:     LIVER:  Fatty infiltration of the liver is noted.  The liver is enlarged.     Small amount of ascites.   BILIARY:  Gallbladder is thickened and distended.   PANCREAS:  No lesion, fluid collection, ductal dilatation, or atrophy.     SPLEEN:  No enlargement or focal lesion.     KIDNEYS:  No mass, obstruction, or calcification.     ADRENALS:  No mass or enlargement.     AORTA/VASCULAR:  No aneurysm or dissection.     RETROPERITONEUM:  No mass or adenopathy.     BOWEL/MESENTERY:  Mild thickening in the cecum.  Mild to moderate amount    of ascites is noted.  There is associated reactive enhancement of the    bowel wall.   ABDOMINAL WALL:  No mass or hernia.     URINARY BLADDER:  No visible focal wall thickening, lesion, or calculus.     PELVIC NODES:  No adenopathy.     PELVIC ORGANS:  Calcification in the right-side of  the pelvis is stable.   BONES:  No bony lesion or fracture.     LUNG BASES:  No visible pulmonary or pleural disease.     OTHER:  Negative.                           =====   CONCLUSION:         1. Marked fatty infiltration of the enlarged liver is noted.       2. Small to moderate amount of ascites throughout the abdomen pelvis is    noted.       3. An acute inflammatory process is not identified.       4. Calcification in the right-sided pelvis is stable.           LOCATION:  [facility]           Dictated by (CST): Eddie Fields MD on 2/01/2024 at 10:25 PM        Finalized by (CST): Eddie Fields MD on 2/01/2024 at 10:29 PM             Labs Reviewed   COMP METABOLIC PANEL (14) - Abnormal; Notable for the following components:       Result Value    Glucose 116 (*)     BUN 4 (*)     Creatinine 0.36 (*)      (*)     Alkaline Phosphatase 311 (*)     Bilirubin, Total 5.1 (*)     Total Protein 5.9 (*)     Albumin 2.1 (*)     A/G Ratio 0.6 (*)     All other components within normal limits   CBC W/ DIFFERENTIAL - Abnormal; Notable for the following components:    WBC 11.1 (*)     RBC 3.05 (*)     HGB 10.9 (*)     HCT 33.7 (*)     .0 (*)     .5 (*)     MCH 35.7 (*)     Neutrophil Absolute Prelim 8.94 (*)     Neutrophil Absolute 8.94 (*)     All other components within normal limits   LIPASE - Normal   MAGNESIUM - Normal   ETHYL ALCOHOL - Normal   SARS-COV-2/FLU A AND B/RSV BY PCR (GENEXPERT) - Normal    Narrative:     This test is intended for the qualitative detection and differentiation of SARS-CoV-2, influenza A, influenza B, and respiratory syncytial virus (RSV) viral RNA in nasopharyngeal or nares swabs from individuals suspected of respiratory viral infection consistent with COVID-19 by their healthcare provider. Signs and symptoms of respiratory viral infection due to SARS-CoV-2, influenza, and RSV can be similar.    Test performed using the Xpai[employer]ish[employer] Xpress SARS-CoV-2/FLU/RSV (real time  RT-PCR)  assay on the GeneXpert instrument, [place], [place] [address].   This test is being used under the Food and Drug Administration's Emergency Use Authorization.    The authorized Fact Sheet for Healthcare Providers for this assay is available upon request from the laboratory.   CBC WITH DIFFERENTIAL WITH PLATELET    Narrative:     The following orders were created for panel order CBC With Differential With Platelet.  Procedure                               Abnormality         Status                     ---------                               -----------         ------                     CBC W/ DIFFERENTIAL[[id number]]          Abnormal            Final result                 Please view results for these tests on the individual orders.   URINALYSIS WITH CULTURE REFLEX   ICTOTEST   RAINBOW DRAW BLUE   RAINBOW DRAW BLUE   BLOOD CULTURE   BLOOD CULTURE     Laboratory workup reviewed.  Alcohol is normal and magnesium lipase normal COVID RSV influenza negative.  White count 11.1.  Hemoglobin 10.9.  Glucose 116.  Creatinine 0.36.  Patient does have elevated bilirubin and and alk phos AST.    Discussed with gastroenterology.  Patient will be admitted likely needs paracentesis and further GI evaluation.  Discussed with hospital service for admission as well.  Patient hemodynamically stable for the floor at this time.        Admission disposition: 2/2/2024 12:20 AM                                        Medical Decision Making      Disposition and Plan     Clinical Impression:  1. Other ascites         Disposition:  Admit  2/2/2024 12:20 am    Follow-up:  No follow-up provider specified.        Medications Prescribed:  Current Discharge Medication List                            Hospital Problems       Present on Admission  Date Reviewed: 2/2/2024            ICD-10-CM Noted POA    * (Principal) Other ascites R18.8 2/2/2024 Yes    Abnormal LFTs R79.89 6/14/2023 Yes    Alcohol abuse F10.10 6/14/2023 Yes     Hyperbilirubinemia E80.6 6/14/2023 Yes

## 2024-02-02 NOTE — PROGRESS NOTES
NURSING ADMISSION NOTE      Patient admitted via Ambulatory  Oriented to room.  Safety precautions initiated.  Bed in low position.  Call light in reach.    New admission, arrived to unit, A&Ox4 on room air, no complaints if pain. VSS, abdomen distended, strict npo, pt to possibly getting paracentesis. Call light within reach, frequent checks made, needs met.

## 2024-02-02 NOTE — ED QUICK NOTES
Orders for admission, patient is aware of plan and ready to go upstairs. Any questions, please call ED EZIO Gonzales  at extension [phone].     Vaccinated?  Type of COVID test sent:  COVID Suspicion level: Low/High      Titratable drug(s) infusing:  Rate:    LOC at time of transport:A+Ox4    Other pertinent information:    CIWA score=  NIH score=

## 2024-02-02 NOTE — IMAGING NOTE
Spoke with floor RN-Lauren, pt has been NPO since 1500 yesterday-informed ok to give po meds but hold blood thinners. PT/INR will be ordered STAT and once resulted US to coordinate a time with the Radiologist and coordinate with the floor RN. RN verbalized understanding.

## 2024-02-03 LAB
ALBUMIN SERPL-MCNC: 2 G/DL (ref 3.4–5)
ALBUMIN/GLOB SERPL: 0.7 {RATIO} (ref 1–2)
ALP LIVER SERPL-CCNC: 225 U/L
ALT SERPL-CCNC: 38 U/L
ANION GAP SERPL CALC-SCNC: 4 MMOL/L (ref 0–18)
AST SERPL-CCNC: 82 U/L (ref 15–37)
BASOPHILS # BLD AUTO: 0.03 X10(3) UL (ref 0–0.2)
BASOPHILS NFR BLD AUTO: 0.3 %
BILIRUB SERPL-MCNC: 4.8 MG/DL (ref 0.1–2)
BUN BLD-MCNC: 3 MG/DL (ref 9–23)
C DIFF TOX B STL QL: NEGATIVE
CALCIUM BLD-MCNC: 8.1 MG/DL (ref 8.5–10.1)
CHLORIDE SERPL-SCNC: 108 MMOL/L (ref 98–112)
CO2 SERPL-SCNC: 26 MMOL/L (ref 21–32)
CREAT BLD-MCNC: 0.36 MG/DL
EGFRCR SERPLBLD CKD-EPI 2021: 128 ML/MIN/1.73M2 (ref 60–?)
EOSINOPHIL # BLD AUTO: 0.02 X10(3) UL (ref 0–0.7)
EOSINOPHIL NFR BLD AUTO: 0.2 %
ERYTHROCYTE [DISTWIDTH] IN BLOOD BY AUTOMATED COUNT: 14.6 %
GLOBULIN PLAS-MCNC: 3 G/DL (ref 2.8–4.4)
GLUCOSE BLD-MCNC: 113 MG/DL (ref 70–99)
HCT VFR BLD AUTO: 30.1 %
HGB BLD-MCNC: 10 G/DL
IMM GRANULOCYTES # BLD AUTO: 0.04 X10(3) UL (ref 0–1)
IMM GRANULOCYTES NFR BLD: 0.4 %
LYMPHOCYTES # BLD AUTO: 1.64 X10(3) UL (ref 1–4)
LYMPHOCYTES NFR BLD AUTO: 15.8 %
MCH RBC QN AUTO: 36.4 PG (ref 26–34)
MCHC RBC AUTO-ENTMCNC: 33.2 G/DL (ref 31–37)
MCV RBC AUTO: 109.5 FL
MONOCYTES # BLD AUTO: 0.95 X10(3) UL (ref 0.1–1)
MONOCYTES NFR BLD AUTO: 9.1 %
NEUTROPHILS # BLD AUTO: 7.73 X10 (3) UL (ref 1.5–7.7)
NEUTROPHILS # BLD AUTO: 7.73 X10(3) UL (ref 1.5–7.7)
NEUTROPHILS NFR BLD AUTO: 74.2 %
OSMOLALITY SERPL CALC.SUM OF ELEC: 283 MOSM/KG (ref 275–295)
PLATELET # BLD AUTO: 127 10(3)UL (ref 150–450)
POTASSIUM SERPL-SCNC: 3.7 MMOL/L (ref 3.5–5.1)
PROT SERPL-MCNC: 5 G/DL (ref 6.4–8.2)
RBC # BLD AUTO: 2.75 X10(6)UL
SODIUM SERPL-SCNC: 138 MMOL/L (ref 136–145)
WBC # BLD AUTO: 10.4 X10(3) UL (ref 4–11)

## 2024-02-03 PROCEDURE — 99232 SBSQ HOSP IP/OBS MODERATE 35: CPT | Performed by: STUDENT IN AN ORGANIZED HEALTH CARE EDUCATION/TRAINING PROGRAM

## 2024-02-03 RX ORDER — FUROSEMIDE 20 MG/1
20 TABLET ORAL DAILY
Status: DISCONTINUED | OUTPATIENT
Start: 2024-02-03 | End: 2024-02-03

## 2024-02-03 RX ORDER — CALCIUM CARBONATE 500 MG/1
500 TABLET, CHEWABLE ORAL 2 TIMES DAILY PRN
Status: DISCONTINUED | OUTPATIENT
Start: 2024-02-03 | End: 2024-02-08

## 2024-02-03 RX ORDER — MORPHINE SULFATE 2 MG/ML
1 INJECTION, SOLUTION INTRAMUSCULAR; INTRAVENOUS EVERY 2 HOUR PRN
Status: DISCONTINUED | OUTPATIENT
Start: 2024-02-03 | End: 2024-02-08

## 2024-02-03 RX ORDER — CALCIUM CARBONATE 500 MG/1
TABLET, CHEWABLE ORAL
Status: CANCELLED | OUTPATIENT
Start: 2024-02-03

## 2024-02-03 RX ORDER — SPIRONOLACTONE 25 MG/1
75 TABLET ORAL DAILY
Status: DISCONTINUED | OUTPATIENT
Start: 2024-02-03 | End: 2024-02-08

## 2024-02-03 RX ORDER — FUROSEMIDE 20 MG/1
20 TABLET ORAL
Status: DISCONTINUED | OUTPATIENT
Start: 2024-02-03 | End: 2024-02-08

## 2024-02-03 RX ORDER — POTASSIUM CHLORIDE 20 MEQ/1
40 TABLET, EXTENDED RELEASE ORAL ONCE
Status: COMPLETED | OUTPATIENT
Start: 2024-02-03 | End: 2024-02-03

## 2024-02-03 RX ORDER — MORPHINE SULFATE 2 MG/ML
2 INJECTION, SOLUTION INTRAMUSCULAR; INTRAVENOUS EVERY 2 HOUR PRN
Status: DISCONTINUED | OUTPATIENT
Start: 2024-02-03 | End: 2024-02-08

## 2024-02-03 NOTE — PLAN OF CARE
Received patient at 0730. Alert and Oriented x4. O2 saturation 96% On room air. Breath sounds clear. Bed is locked and in low position. Call light and personal items within reach.  Pt voiding with no issue. Has been having frequent soft stools light brown/yellow/hiwot in color, is negative for cdiff. Pt tolerating ambulation well, advised to call for assist as we monitor blood pressures. Run low and on midodrine at baseline. PO lasix started for swelling. Abdomen distended but soft, pt feels has been getting worse again. CM consulted at patient's request for ETOH rehab information. Advised that this is done per psych liaison. New consult placed. Reviewed plan of care and patient verbalizes understanding.      Problem: Patient/Family Goals  Goal: Patient/Family Long Term Goal  Description: Patient's Long Term Goal: to go home    Interventions:  - labs, paracentesis, monitor BP    - See additional Care Plan goals for specific interventions  Outcome: Progressing  Goal: Patient/Family Short Term Goal  Description: Patient's Short Term Goal: feel better    Interventions:   - - labs, paracentesis, monitor BP    - See additional Care Plan goals for specific interventions  Outcome: Progressing     Problem: GASTROINTESTINAL - ADULT  Goal: Maintains or returns to baseline bowel function  Description: INTERVENTIONS:  - Assess bowel function  - Maintain adequate hydration with IV or PO as ordered and tolerated  - Evaluate effectiveness of GI medications  - Encourage mobilization and activity  - Obtain nutritional consult as needed  - Establish a toileting routine/schedule  - Consider collaborating with pharmacy to review patient's medication profile  Outcome: Progressing     Problem: METABOLIC/FLUID AND ELECTROLYTES - ADULT  Goal: Electrolytes maintained within normal limits  Description: INTERVENTIONS:  - Monitor labs and rhythm and assess patient for signs and symptoms of electrolyte imbalances  - Administer electrolyte  replacement as ordered  - Monitor response to electrolyte replacements, including rhythm and repeat lab results as appropriate  - Fluid restriction as ordered  - Instruct patient on fluid and nutrition restrictions as appropriate  Outcome: Progressing  Goal: Hemodynamic stability and optimal renal function maintained  Description: INTERVENTIONS:  - Monitor labs and assess for signs and symptoms of volume excess or deficit  - Monitor intake, output and patient weight  - Monitor urine specific gravity, serum osmolarity and serum sodium as indicated or ordered  - Monitor response to interventions for patient's volume status, including labs, urine output, blood pressure (other measures as available)  - Encourage oral intake as appropriate  - Instruct patient on fluid and nutrition restrictions as appropriate  Outcome: Progressing

## 2024-02-03 NOTE — CM/SW NOTE
Received order for ETOH/substance abuse treatment information, pt interested in [facility].  Spoke with pt's RN who will place consult to psych liaison.  Information/resources placed on AVS in case of weekend discharge.  / to remain available for support and/or discharge planning.     Nini Back, [facility]  Discharge Planner  [phone]

## 2024-02-03 NOTE — PROGRESS NOTES
[facility]     Hospitalist Progress Note     Tamy Wall Patient Status:  Inpatient    1980 MRN [id number]   Location [facility] 4NW-A Attending Cuong Pagan MD   Hosp Day # 1 PCP Karoline Burks DO     Chief Complaint: Ascites     Subjective:     Patient  s/p 1.3 L paracentesis on , deferred as pt still feeling unwell post procedure. Today asking about [facility] referrals. She is having multiple yellow/pale colored stools as well.     Objective:    Review of Systems:   A comprehensive review of systems was completed; pertinent positive and negatives stated in subjective.    Vital signs:  Temp:  [98 °F (36.7 °C)-99 °F (37.2 °C)] 98.7 °F (37.1 °C)  Pulse:  [78-93] 93  Resp:  [18-20] 18  BP: ()/(61-69) 96/63  SpO2:  [97 %-100 %] 100 %    Physical Exam:    General: No acute distress  Respiratory: No wheezes, no rhonchi  Cardiovascular: S1, S2, regular rate and rhythm  Abdomen: Soft, Non-tender, non-distended, positive bowel sounds  Neuro: No new focal deficits.   Extremities: No edema      Diagnostic Data:    Labs:  Recent Labs   Lab 24  0850 24  1125   WBC 11.1* 8.6  --  10.4   HGB 10.9* 8.9*  --  10.0*   .5* 108.0*  --  109.5*   .0* 104.0*  --  127.0*   INR  --   --  1.31*  --        Recent Labs   Lab 24  1458   * 83  --    BUN 4* 4*  --    CREATSERUM 0.36* 0.29*  --    CA 8.6 8.3*  --    ALB 2.1* 1.7*  --     140  --    K 4.0 2.9* 3.7    108  --    CO2 28.0 27.0  --    ALKPHO 311* 240*  --    * 94*  --    ALT 54 42  --    BILT 5.1* 4.0*  --    TP 5.9* 4.6*  --        Estimated Creatinine Clearance: 186.8 mL/min (A) (based on SCr of 0.29 mg/dL (L)).    No results for input(s): \"TROP\", \"TROPHS\", \"CK\" in the last 168 hours.    Recent Labs   Lab 24  0850   PTP 16.4*   INR 1.31*                  Microbiology    Hospital Encounter on 24   1. Body Fluid Cult Aerobic and  Anaerobic     Status: None (Preliminary result)    Collection Time: 02/02/24 11:36 AM    Specimen: Ascites fluid; Body fluid, unspecified   Result Value Ref Range    Body Fluid Smear This is a cytocentrifuged smear. N/A    Body Fluid Smear 1+ WBCs seen N/A    Body Fluid Smear No organisms seen N/A   2. Urine Culture, Routine     Status: None (Preliminary result)    Collection Time: 02/01/24 10:55 PM    Specimen: Urine, clean catch   Result Value Ref Range    Urine Culture No Growth at 18-24 hrs. N/A   3. Blood Culture     Status: None (Preliminary result)    Collection Time: 02/01/24 10:55 PM    Specimen: Blood,peripheral   Result Value Ref Range    Blood Culture Result No Growth 1 Day N/A         Imaging: Reviewed in Epic.    Medications:    furosemide  20 mg Oral Daily    pantoprazole  40 mg Oral QAM AC    folic acid  1 mg Oral Daily    enoxaparin  40 mg Subcutaneous Daily    midodrine  2.5 mg Oral BID       Assessment & Plan:      #Abdominal distension, ascites likely due to underlying liver cirrhosis  US paracentesis  2/2- 1.3 L removed  Started Lasix on 2/3- monitor BP - pt is usually hypotensive and on midodrine. If tolerating will DC on lasix      #Etoh cirrhosis, now with ascites, elevated LFTs and bilirubin      #Etoh abuse, unclear if ongoing     #Hypotension, monitor- chronic     #Anemia, thrombocytopenia, due to BM suppression from chronic Etoh abuse      Sosa Wood MD    Supplementary Documentation:     Quality:  DVT Mechanical Prophylaxis:   SCDs,    DVT Pharmacologic Prophylaxis   Medication    enoxaparin (Lovenox) 40 MG/0.4ML SUBQ injection 40 mg                Code Status: Prior  Larose: No urinary catheter in place  Larose Duration (in days):   Central line:    MICKY: 2/4/2024    Discharge is dependent on: clinical   At this point Ms. Wall is expected to be discharge to: home     The 21st Century Cures Act makes medical notes like these available to patients in the interest of transparency.  Please be advised this is a medical document. Medical documents are intended to carry relevant information, facts as evident, and the clinical opinion of the practitioner. The medical note is intended as peer to peer communication and may appear blunt or direct. It is written in medical language and may contain abbreviations or verbiage that are unfamiliar.

## 2024-02-03 NOTE — PROGRESS NOTES
02/03/24 1334 02/03/24 1336 02/03/24 1338   Vital Signs   BP 95/59 95/67 99/68   MAP (mmHg) 67 73 76   BP Location Right arm Right arm Right arm   BP Method Automatic Automatic Automatic   Patient Position Lying Sitting Standing

## 2024-02-03 NOTE — PROGRESS NOTES
Patient alert x4, VSS. No complaint during night. Paracentesis site clean, dry and intact. All meds given per MAR. Pt slept well overnight. Call light within reach.     Pt noted to have loose BM overnight. Cont + precautions set and sample needing to be taken.

## 2024-02-03 NOTE — PROGRESS NOTES
Gastroenterology Progress Note  Patient Name: Tamy Wall  Chief Complaint: Ascites, alcohol hepatitis  S: The patient reported that she felt better yesterday, after having had a paracentesis. However, because of bowel loops, she was told that they could only safely remove 1.3 L of ascites.  Today, she reports that she has noticed a sense of pressure in the abdomen again, with associated weakness and shortness of breath when trying to get around.  No n/v.  No abdominal pain.    O: BP 99/68 (BP Location: Right arm)   Pulse 97   Temp 98.7 °F (37.1 °C) (Oral)   Resp 18   Ht 5' 1\" (1.549 m)   Wt 114 lb (51.7 kg)   LMP 05/28/2023 (Approximate)   SpO2 100%   BMI 21.54 kg/m²   Gen: AAOx3  Eyes: (+)icteric sclerae  Abd: (+)BS, soft, (+)tender over site of paracentesis needle:  (+) markedly distended with fluid wave and shifting dullness consistent with ascites; no rebound or guarding  Ext: No edema or cyanosis  Skin: Warm and dry  Laboratory Data:   Lab Results   Component Value Date    WBC 10.4 02/03/2024    HGB 10.0 02/03/2024    HCT 30.1 02/03/2024    .0 02/03/2024    CREATSERUM 0.36 02/03/2024    BUN 3 02/03/2024     02/03/2024    K 3.7 02/03/2024     02/03/2024    CO2 26.0 02/03/2024     02/03/2024    CA 8.1 02/03/2024    ALB 2.0 02/03/2024    ALKPHO 225 02/03/2024    BILT 4.8 02/03/2024    AST 82 02/03/2024    ALT 38 02/03/2024     Recent Labs   Lab 02/01/24  1956 02/02/24  0511 02/02/24  1458 02/03/24  1125   * 83  --  113*   BUN 4* 4*  --  3*   CREATSERUM 0.36* 0.29*  --  0.36*   CA 8.6 8.3*  --  8.1*    140  --  138   K 4.0 2.9* 3.7 3.7    108  --  108   CO2 28.0 27.0  --  26.0     Recent Labs   Lab 02/03/24  1125   RBC 2.75*   HGB 10.0*   HCT 30.1*   .5*   MCH 36.4*   MCHC 33.2   RDW 14.6   NEPRELIM 7.73*   WBC 10.4   .0*       Recent Labs   Lab 02/01/24  1956 02/02/24  0511 02/03/24  1125   ALT 54 42 38   * 94* 82*         Ref Range &  Units 02/02/24 1136   Body Fluid Color Yellow   Body Fluid Clarity Clear   WBC, Peritoneal Fluid  /mm3 23   RBC Peritoneal  /mm3 <3,000   Resulting [facility] ([facility])        Specimen Collected: 02/02/24 1136 Last Resulted: 02/02/24 1310      Status: Final result        Component  Ref Range & Units 02/02/24 1136   Albumin, Other Body Fld  g/dL 0.7   Source, Other Body Fld peritoneal   Resulting [facility] ([facility])   Narrative    A reference range has not been established for this body fluid type.          Specimen Collected: 02/02/24 1136 Last Resulted: 02/02/24 1609 View Other Order Details          Assessment: This is a woman with alcohol hepatitis and who presented with abdominal ascites, and abnormal liver chemistries and jaundice.  She also has a mild anemia, which is at her current baseline.  She had a transient response to a small amount of fluid removed by paracentesis, but is having recurrent symptoms today.  Her SAAG is variable based on the timing of a serum albumin.  Though, of the 3 labs drawn, SAAG is >1.1 on 2 of the 3 measurements.   Thus, the ascites is likely to come from portal hypertension.   She also has shortness of breath.  And, alcohol can impact cardiac function as well.   Plan:   1) Pursue Echocardiogram with bubble study   2) Increase Lasix to 20 mg po bid  3) Add Spironolactone 75 mg po daily and titrate  4) Based on clinical direction, we may need to repeat her paracentesis - though her CT scan did not show a large amount - rather, a mild-moderate amount.

## 2024-02-04 ENCOUNTER — APPOINTMENT (OUTPATIENT)
Dept: GENERAL RADIOLOGY | Facility: HOSPITAL | Age: 44
End: 2024-02-04
Attending: STUDENT IN AN ORGANIZED HEALTH CARE EDUCATION/TRAINING PROGRAM
Payer: MEDICAID

## 2024-02-04 ENCOUNTER — APPOINTMENT (OUTPATIENT)
Dept: GENERAL RADIOLOGY | Facility: HOSPITAL | Age: 44
End: 2024-02-04
Payer: MEDICAID

## 2024-02-04 LAB
ADENOVIRUS PCR:: NOT DETECTED
ALBUMIN SERPL-MCNC: 2 G/DL (ref 3.4–5)
ALBUMIN/GLOB SERPL: 0.6 {RATIO} (ref 1–2)
ALP LIVER SERPL-CCNC: 216 U/L
ALT SERPL-CCNC: 39 U/L
ANION GAP SERPL CALC-SCNC: 3 MMOL/L (ref 0–18)
AST SERPL-CCNC: 100 U/L (ref 15–37)
B PARAPERT DNA SPEC QL NAA+PROBE: NOT DETECTED
B PERT DNA SPEC QL NAA+PROBE: NOT DETECTED
BILIRUB SERPL-MCNC: 4.4 MG/DL (ref 0.1–2)
BILIRUB UR QL CFM: POSITIVE
BUN BLD-MCNC: 4 MG/DL (ref 9–23)
C PNEUM DNA SPEC QL NAA+PROBE: NOT DETECTED
CALCIUM BLD-MCNC: 8.1 MG/DL (ref 8.5–10.1)
CHLORIDE SERPL-SCNC: 107 MMOL/L (ref 98–112)
CLARITY UR REFRACT.AUTO: CLEAR
CO2 SERPL-SCNC: 28 MMOL/L (ref 21–32)
CORONAVIRUS 229E PCR:: NOT DETECTED
CORONAVIRUS HKU1 PCR:: NOT DETECTED
CORONAVIRUS NL63 PCR:: NOT DETECTED
CORONAVIRUS OC43 PCR:: NOT DETECTED
CREAT BLD-MCNC: 0.45 MG/DL
EGFRCR SERPLBLD CKD-EPI 2021: 122 ML/MIN/1.73M2 (ref 60–?)
FLUAV H1 2009 PAND RNA SPEC QL NAA+PROBE: DETECTED
FLUBV RNA SPEC QL NAA+PROBE: NOT DETECTED
GLOBULIN PLAS-MCNC: 3.2 G/DL (ref 2.8–4.4)
GLUCOSE BLD-MCNC: 95 MG/DL (ref 70–99)
GLUCOSE UR STRIP.AUTO-MCNC: NORMAL MG/DL
KETONES UR STRIP.AUTO-MCNC: NEGATIVE MG/DL
LEUKOCYTE ESTERASE UR QL STRIP.AUTO: 25
METAPNEUMOVIRUS PCR:: NOT DETECTED
MYCOPLASMA PNEUMONIA PCR:: NOT DETECTED
NITRITE UR QL STRIP.AUTO: NEGATIVE
OSMOLALITY SERPL CALC.SUM OF ELEC: 283 MOSM/KG (ref 275–295)
PARAINFLUENZA 1 PCR:: NOT DETECTED
PARAINFLUENZA 2 PCR:: NOT DETECTED
PARAINFLUENZA 3 PCR:: NOT DETECTED
PARAINFLUENZA 4 PCR:: NOT DETECTED
PH UR STRIP.AUTO: 5.5 [PH] (ref 5–8)
POTASSIUM SERPL-SCNC: 3.8 MMOL/L (ref 3.5–5.1)
POTASSIUM SERPL-SCNC: 3.8 MMOL/L (ref 3.5–5.1)
PROT SERPL-MCNC: 5.2 G/DL (ref 6.4–8.2)
PROT UR STRIP.AUTO-MCNC: NEGATIVE MG/DL
RBC UR QL AUTO: NEGATIVE
RHINOVIRUS/ENTERO PCR:: NOT DETECTED
RSV RNA SPEC QL NAA+PROBE: NOT DETECTED
SARS-COV-2 RNA NPH QL NAA+NON-PROBE: NOT DETECTED
SODIUM SERPL-SCNC: 138 MMOL/L (ref 136–145)
SP GR UR STRIP.AUTO: 1.02 (ref 1–1.03)
UROBILINOGEN UR STRIP.AUTO-MCNC: 2 MG/DL

## 2024-02-04 PROCEDURE — 71046 X-RAY EXAM CHEST 2 VIEWS: CPT

## 2024-02-04 PROCEDURE — 74019 RADEX ABDOMEN 2 VIEWS: CPT

## 2024-02-04 PROCEDURE — 99233 SBSQ HOSP IP/OBS HIGH 50: CPT | Performed by: STUDENT IN AN ORGANIZED HEALTH CARE EDUCATION/TRAINING PROGRAM

## 2024-02-04 RX ORDER — MIDODRINE HYDROCHLORIDE 2.5 MG/1
2.5 TABLET ORAL ONCE
Status: COMPLETED | OUTPATIENT
Start: 2024-02-04 | End: 2024-02-04

## 2024-02-04 RX ORDER — POLYETHYLENE GLYCOL 3350 17 G/17G
17 POWDER, FOR SOLUTION ORAL DAILY
Status: DISCONTINUED | OUTPATIENT
Start: 2024-02-04 | End: 2024-02-08

## 2024-02-04 RX ORDER — POTASSIUM CHLORIDE 20 MEQ/1
40 TABLET, EXTENDED RELEASE ORAL ONCE
Status: DISCONTINUED | OUTPATIENT
Start: 2024-02-04 | End: 2024-02-05

## 2024-02-04 RX ORDER — KETOROLAC TROMETHAMINE 30 MG/ML
30 INJECTION, SOLUTION INTRAMUSCULAR; INTRAVENOUS ONCE
Status: COMPLETED | OUTPATIENT
Start: 2024-02-04 | End: 2024-02-04

## 2024-02-04 RX ORDER — SODIUM CHLORIDE 9 MG/ML
INJECTION, SOLUTION INTRAVENOUS CONTINUOUS
Status: ACTIVE | OUTPATIENT
Start: 2024-02-04 | End: 2024-02-04

## 2024-02-04 RX ORDER — OSELTAMIVIR PHOSPHATE 75 MG/1
75 CAPSULE ORAL EVERY 12 HOURS SCHEDULED
Status: DISCONTINUED | OUTPATIENT
Start: 2024-02-04 | End: 2024-02-08

## 2024-02-04 NOTE — PLAN OF CARE
Problem: Patient/Family Goals  Goal: Patient/Family Long Term Goal  Description: Patient's Long Term Goal: to go home    Interventions:  - labs, paracentesis, monitor BP    - See additional Care Plan goals for specific interventions  Outcome: Progressing  Goal: Patient/Family Short Term Goal  Description: Patient's Short Term Goal: feel better    Interventions:   - - labs, paracentesis, monitor BP    - See additional Care Plan goals for specific interventions  Outcome: Progressing     Problem: METABOLIC/FLUID AND ELECTROLYTES - ADULT  Goal: Electrolytes maintained within normal limits  Description: INTERVENTIONS:  - Monitor labs and rhythm and assess patient for signs and symptoms of electrolyte imbalances  - Administer electrolyte replacement as ordered  - Monitor response to electrolyte replacements, including rhythm and repeat lab results as appropriate  - Fluid restriction as ordered  - Instruct patient on fluid and nutrition restrictions as appropriate  Outcome: Progressing  Goal: Hemodynamic stability and optimal renal function maintained  Description: INTERVENTIONS:  - Monitor labs and assess for signs and symptoms of volume excess or deficit  - Monitor intake, output and patient weight  - Monitor urine specific gravity, serum osmolarity and serum sodium as indicated or ordered  - Monitor response to interventions for patient's volume status, including labs, urine output, blood pressure (other measures as available)  - Encourage oral intake as appropriate  - Instruct patient on fluid and nutrition restrictions as appropriate  Outcome: Progressing     Problem: RISK FOR INFECTION - ADULT  Goal: Absence of fever/infection during anticipated neutropenic period  Description: INTERVENTIONS  - Monitor WBC  - Administer growth factors as ordered  - Implement neutropenic guidelines  Outcome: Progressing     Problem: PAIN - ADULT  Goal: Verbalizes/displays adequate comfort level or patient's stated pain  goal  Description: INTERVENTIONS:  - Encourage pt to monitor pain and request assistance  - Assess pain using appropriate pain scale  - Administer analgesics based on type and severity of pain and evaluate response  - Implement non-pharmacological measures as appropriate and evaluate response  - Consider cultural and social influences on pain and pain management  - Manage/alleviate anxiety  - Utilize distraction and/or relaxation techniques  - Monitor for opioid side effects  - Notify MD/LIP if interventions unsuccessful or patient reports new pain  - Anticipate increased pain with activity and pre-medicate as appropriate  Outcome: Progressing     Problem: CARDIOVASCULAR - ADULT  Goal: Absence of cardiac arrhythmias or at baseline  Description: INTERVENTIONS:  - Continuous cardiac monitoring, monitor vital signs, obtain 12 lead EKG if indicated  - Evaluate effectiveness of antiarrhythmic and heart rate control medications as ordered  - Initiate emergency measures for life threatening arrhythmias  - Monitor electrolytes and administer replacement therapy as ordered  Outcome: Progressing            Pt is alert and oriented x 4. Received Endorsement this morning from night RN with pt having some Low grade fever. Around 8.3 0 am pt temp was 100.6 and pt started having chills and max temperature went up to 102.9. Notified , Tylenol given, Work up done and pt is postive for Influenza A. Droplet isolation initiated and Tamiflu started by MD. Temperature down to 98.9 after IV Toradol given and chills improved. This afternoon pt reported that she vomited twice  when she tried to eat Lunch.  Emesis not witnessed by RN. Also c/o back pain, Tylenol and Zofran given. Nausea improved. Pt is concern about having less urine output with diuretics, Notified Dr. Wood with orders to continue monitor output. Pt's urine is very concentrated.This evening pt BP was 77/52, pt is asymptomatic except slight headache. Denies  dizziness,  is aware, physician directed to hold evening dose of Lasix . Midodrine evening dose given slightly earlier. Plan of care updated to the pt. Will continue to monitor.   1815:  updated about the pt latest BP with no new orders, MD wants to continue monitoring.

## 2024-02-04 NOTE — PROGRESS NOTES
Patient alert x4, VSS. Afebrile. Complaint of abd discomfort and back pain. Tylenol given and heat pack offered. Continued pain. MD paged and morphine ordered for more relief. Pt declined morphine at this time. Ambulates with assistance due to dizziness. No complaint SOB during shift. Call light within reach.

## 2024-02-04 NOTE — PROGRESS NOTES
Gastroenterology Progress Note  Patient Name: Tamy Wall  Chief Complaint: Abdominal distention, fever  S: The patient reports continued abdominal distention and bloating.  No n/v.  No abdominal pain.   O: BP 92/53 (BP Location: Right arm)   Pulse 119   Temp (!) 102.6 °F (39.2 °C) (Oral)   Resp 20   Ht 5' 1\" (1.549 m)   Wt 111 lb 1.6 oz (50.4 kg)   LMP 05/28/2023 (Approximate)   SpO2 100%   BMI 20.99 kg/m²   Gen: AAOx3  Abd: (+)BS, soft, (+) focally tender over site of recent paracentesis; (+) distended with tympany to percussion; no rebound or guarding  Ext: No edema or cyanosis  Skin: Warm and dry  Laboratory Data:   Lab Results   Component Value Date    WBC 10.4 02/03/2024    HGB 10.0 02/03/2024    HCT 30.1 02/03/2024    .0 02/03/2024    CREATSERUM 0.45 02/04/2024    BUN 4 02/04/2024     02/04/2024    K 3.8 02/04/2024    K 3.8 02/04/2024     02/04/2024    CO2 28.0 02/04/2024    GLU 95 02/04/2024    CA 8.1 02/04/2024    ALB 2.0 02/04/2024    ALKPHO 216 02/04/2024    BILT 4.4 02/04/2024     02/04/2024    ALT 39 02/04/2024     Recent Labs   Lab 02/02/24  0511 02/02/24  1458 02/03/24  1125 02/04/24  0739   GLU 83  --  113* 95   BUN 4*  --  3* 4*   CREATSERUM 0.29*  --  0.36* 0.45*   CA 8.3*  --  8.1* 8.1*     --  138 138   K 2.9* 3.7 3.7 3.8  3.8     --  108 107   CO2 27.0  --  26.0 28.0     Recent Labs   Lab 02/03/24  1125   RBC 2.75*   HGB 10.0*   HCT 30.1*   .5*   MCH 36.4*   MCHC 33.2   RDW 14.6   NEPRELIM 7.73*   WBC 10.4   .0*       Recent Labs   Lab 02/02/24  0511 02/03/24  1125 02/04/24  0739   ALT 42 38 39   AST 94* 82* 100*         Assessment: This is a patient with alcohol hepatitis and ascites.  She has had abdominal bloating and distention which briefly improved after paracentesis.  However, she has had increased bloating and distention today.  No n/v.  She was also noted to have a fever up to 102 this morning.  She reports small formed  stools.  No diarrhea or bleeding. Her paracentesis revealed no SBP.    Plan:   1) Obtain obs series xrays abdomen  2) PA/Lat CXR  3) Blood cultures x 2  Collected 2/4/2024  9:20 AM       Status: Final result    Specimen Information: Nasopharyngeal swab; Other   0 Result Notes      Component  Ref Range & Units    SARS-CoV-2 PCR:  Not Detected Not Detected   Adenovirus PCR:  Not Detected Not Detected   Coronavirus 229E PCR:  Not Detected Not Detected   Coronavirus Hku1 PCR:  Not Detected Not Detected   Coronavirus Nl63 PCR:  Not Detected Not Detected   Coronavirus Oc43 PCR:  Not Detected Not Detected   Metapneumovirus PCR:  Not Detected Not Detected   Rhinovirus/Entero PCR:  Not Detected Not Detected   Influenza A H1N1 09 PCR:  Not Detected Detected Abnormal    Influenza B PCR:  Not Detected Not Detected   Parainfluenza 1 PCR:  Not Detected Not Detected   Parainfluenza 2 PCR  Not Detected Not Detected   Parainfluenza 3 PCR  Not Detected Not Detected   Parainfluenza 4 PCR  Not Detected Not Detected   Resp Syncytial Virus PCR  Not Detected Not Detected   Bordetella Pertussis PCR  Not Detected Not Detected   Bordetella Parapertussis PCR  Not Detected Not Detected   Chlamydia pneumonia PCR:  Not Detected Not Detected   Mycoplasma pneumonia PCR:  Not Detected Not Detected   [facility] ([facility])                   This likely explains the fever and complaints of periodic shortness of breath as well.

## 2024-02-04 NOTE — PROGRESS NOTES
[facility]     Hospitalist Progress Note     Tamy Wall Patient Status:  Inpatient    1980 MRN [id number]   Location [facility] 4NW-A Attending Cuong Pagan MD   Hosp Day # 2 PCP Karoline Burks DO     Chief Complaint: Ascites     Subjective:     Patient  febrile this morning, malaise.    Objective:    Review of Systems:   A comprehensive review of systems was completed; pertinent positive and negatives stated in subjective.    Vital signs:  Temp:  [98.5 °F (36.9 °C)-102.9 °F (39.4 °C)] 102.6 °F (39.2 °C)  Pulse:  [] 119  Resp:  [18-20] 20  BP: (91-99)/(53-68) 92/53  SpO2:  [98 %-100 %] 100 %    Physical Exam:    General: No acute distress  Respiratory: No wheezes, no rhonchi  Cardiovascular: S1, S2, regular rate and rhythm  Abdomen: Soft, Non-tender, distended, positive bowel sounds  Neuro: No new focal deficits.   Extremities: No edema      Diagnostic Data:    Labs:  Recent Labs   Lab 246 24  0511 24  0850 24  1125   WBC 11.1* 8.6  --  10.4   HGB 10.9* 8.9*  --  10.0*   .5* 108.0*  --  109.5*   .0* 104.0*  --  127.0*   INR  --   --  1.31*  --        Recent Labs   Lab 24  0511 24  1458 24  1125 24  0739   GLU 83  --  113* 95   BUN 4*  --  3* 4*   CREATSERUM 0.29*  --  0.36* 0.45*   CA 8.3*  --  8.1* 8.1*   ALB 1.7*  --  2.0* 2.0*     --  138 138   K 2.9* 3.7 3.7 3.8  3.8     --  108 107   CO2 27.0  --  26.0 28.0   ALKPHO 240*  --  225* 216*   AST 94*  --  82* 100*   ALT 42  --  38 39   BILT 4.0*  --  4.8* 4.4*   TP 4.6*  --  5.0* 5.2*       Estimated Creatinine Clearance: 120.4 mL/min (A) (based on SCr of 0.45 mg/dL (L)).    No results for input(s): \"TROP\", \"TROPHS\", \"CK\" in the last 168 hours.    Recent Labs   Lab 24  0850   PTP 16.4*   INR 1.31*                  Microbiology    Hospital Encounter on 24   1. Body Fluid Cult Aerobic and Anaerobic     Status: None (Preliminary result)    Collection  Time: 02/02/24 11:36 AM    Specimen: Ascites fluid; Body fluid, unspecified   Result Value Ref Range    Body Fluid Smear This is a cytocentrifuged smear. N/A    Body Fluid Smear 1+ WBCs seen N/A    Body Fluid Smear No organisms seen N/A   2. Urine Culture, Routine     Status: None    Collection Time: 02/01/24 10:55 PM    Specimen: Urine, clean catch   Result Value Ref Range    Urine Culture No Growth 2 Days N/A   3. Blood Culture     Status: None (Preliminary result)    Collection Time: 02/01/24 10:55 PM    Specimen: Blood,peripheral   Result Value Ref Range    Blood Culture Result No Growth 2 Days N/A         Imaging: Reviewed in Epic.    Medications:    potassium chloride  40 mEq Oral Once    oseltamivir  75 mg Oral Q12H    furosemide  20 mg Oral BID (Diuretic)    spironolactone  75 mg Oral Daily    pantoprazole  40 mg Oral QAM AC    folic acid  1 mg Oral Daily    enoxaparin  40 mg Subcutaneous Daily    midodrine  2.5 mg Oral BID       Assessment & Plan:      #fever 2/4/24  Influenza +- tamiflu started  Gentle IVF    #Abdominal distension, ascites likely due to underlying liver cirrhosis  US paracentesis  2/2- 1.3 L removed  Started Lasix on 2/3- monitor BP - pt is usually hypotensive and on midodrine. If tolerating will DC on lasix      #Etoh cirrhosis, now with ascites, elevated LFTs and bilirubin      #Etoh abuse, unclear if ongoing     #Hypotension, monitor- chronic     #Anemia, thrombocytopenia, due to BM suppression from chronic Etoh abuse      Sosa Wood MD    Supplementary Documentation:     Quality:  DVT Mechanical Prophylaxis:   SCDs,    DVT Pharmacologic Prophylaxis   Medication    enoxaparin (Lovenox) 40 MG/0.4ML SUBQ injection 40 mg                Code Status: Prior  Larose: No urinary catheter in place  Larose Duration (in days):   Central line:    MICKY: 2/4/2024    Discharge is dependent on: clinical   At this point Ms. Wall is expected to be discharge to: home     The 21st Century Cures Act  makes medical notes like these available to patients in the interest of transparency. Please be advised this is a medical document. Medical documents are intended to carry relevant information, facts as evident, and the clinical opinion of the practitioner. The medical note is intended as peer to peer communication and may appear blunt or direct. It is written in medical language and may contain abbreviations or verbiage that are unfamiliar.

## 2024-02-05 ENCOUNTER — APPOINTMENT (OUTPATIENT)
Dept: ULTRASOUND IMAGING | Facility: HOSPITAL | Age: 44
End: 2024-02-05
Attending: STUDENT IN AN ORGANIZED HEALTH CARE EDUCATION/TRAINING PROGRAM
Payer: MEDICAID

## 2024-02-05 ENCOUNTER — APPOINTMENT (OUTPATIENT)
Dept: CV DIAGNOSTICS | Facility: HOSPITAL | Age: 44
End: 2024-02-05
Attending: INTERNAL MEDICINE
Payer: MEDICAID

## 2024-02-05 LAB
ALBUMIN SERPL-MCNC: 1.9 G/DL (ref 3.4–5)
ALBUMIN/GLOB SERPL: 0.6 {RATIO} (ref 1–2)
ALP LIVER SERPL-CCNC: 196 U/L
ALT SERPL-CCNC: 64 U/L
ANION GAP SERPL CALC-SCNC: 4 MMOL/L (ref 0–18)
AST SERPL-CCNC: 334 U/L (ref 15–37)
BASOPHILS # BLD AUTO: 0.06 X10(3) UL (ref 0–0.2)
BASOPHILS NFR BLD AUTO: 0.5 %
BILIRUB SERPL-MCNC: 4.5 MG/DL (ref 0.1–2)
BUN BLD-MCNC: 9 MG/DL (ref 9–23)
CALCIUM BLD-MCNC: 7.9 MG/DL (ref 8.5–10.1)
CHLORIDE SERPL-SCNC: 105 MMOL/L (ref 98–112)
CO2 SERPL-SCNC: 27 MMOL/L (ref 21–32)
CREAT BLD-MCNC: 0.46 MG/DL
EGFRCR SERPLBLD CKD-EPI 2021: 121 ML/MIN/1.73M2 (ref 60–?)
EOSINOPHIL # BLD AUTO: 0.01 X10(3) UL (ref 0–0.7)
EOSINOPHIL NFR BLD AUTO: 0.1 %
ERYTHROCYTE [DISTWIDTH] IN BLOOD BY AUTOMATED COUNT: 14.6 %
GLOBULIN PLAS-MCNC: 3.1 G/DL (ref 2.8–4.4)
GLUCOSE BLD-MCNC: 88 MG/DL (ref 70–99)
HCT VFR BLD AUTO: 31.4 %
HGB BLD-MCNC: 9.9 G/DL
IMM GRANULOCYTES # BLD AUTO: 0.08 X10(3) UL (ref 0–1)
IMM GRANULOCYTES NFR BLD: 0.7 %
INR BLD: 1.44 (ref 0.8–1.2)
LYMPHOCYTES # BLD AUTO: 1.8 X10(3) UL (ref 1–4)
LYMPHOCYTES NFR BLD AUTO: 16.3 %
MCH RBC QN AUTO: 35.6 PG (ref 26–34)
MCHC RBC AUTO-ENTMCNC: 31.5 G/DL (ref 31–37)
MCV RBC AUTO: 112.9 FL
MONOCYTES # BLD AUTO: 0.92 X10(3) UL (ref 0.1–1)
MONOCYTES NFR BLD AUTO: 8.4 %
NEUTROPHILS # BLD AUTO: 8.14 X10 (3) UL (ref 1.5–7.7)
NEUTROPHILS # BLD AUTO: 8.14 X10(3) UL (ref 1.5–7.7)
NEUTROPHILS NFR BLD AUTO: 74 %
OSMOLALITY SERPL CALC.SUM OF ELEC: 280 MOSM/KG (ref 275–295)
PLATELET # BLD AUTO: 195 10(3)UL (ref 150–450)
POTASSIUM SERPL-SCNC: 3.4 MMOL/L (ref 3.5–5.1)
POTASSIUM SERPL-SCNC: 3.4 MMOL/L (ref 3.5–5.1)
POTASSIUM SERPL-SCNC: 4.4 MMOL/L (ref 3.5–5.1)
PROT SERPL-MCNC: 5 G/DL (ref 6.4–8.2)
PROTHROMBIN TIME: 17.7 SECONDS (ref 11.6–14.8)
RBC # BLD AUTO: 2.78 X10(6)UL
SODIUM SERPL-SCNC: 136 MMOL/L (ref 136–145)
WBC # BLD AUTO: 11 X10(3) UL (ref 4–11)

## 2024-02-05 PROCEDURE — 99233 SBSQ HOSP IP/OBS HIGH 50: CPT | Performed by: STUDENT IN AN ORGANIZED HEALTH CARE EDUCATION/TRAINING PROGRAM

## 2024-02-05 PROCEDURE — 76705 ECHO EXAM OF ABDOMEN: CPT | Performed by: STUDENT IN AN ORGANIZED HEALTH CARE EDUCATION/TRAINING PROGRAM

## 2024-02-05 PROCEDURE — 93306 TTE W/DOPPLER COMPLETE: CPT | Performed by: INTERNAL MEDICINE

## 2024-02-05 RX ORDER — KETOROLAC TROMETHAMINE 30 MG/ML
30 INJECTION, SOLUTION INTRAMUSCULAR; INTRAVENOUS ONCE
Status: DISCONTINUED | OUTPATIENT
Start: 2024-02-05 | End: 2024-02-08

## 2024-02-05 RX ORDER — POTASSIUM CHLORIDE 20 MEQ/1
40 TABLET, EXTENDED RELEASE ORAL EVERY 4 HOURS
Qty: 4 TABLET | Refills: 0 | Status: COMPLETED | OUTPATIENT
Start: 2024-02-05 | End: 2024-02-05

## 2024-02-05 RX ORDER — BENZONATATE 100 MG/1
100 CAPSULE ORAL 3 TIMES DAILY PRN
Status: DISCONTINUED | OUTPATIENT
Start: 2024-02-05 | End: 2024-02-08

## 2024-02-05 RX ORDER — SODIUM CHLORIDE 9 MG/ML
INJECTION, SOLUTION INTRAVENOUS CONTINUOUS
Status: ACTIVE | OUTPATIENT
Start: 2024-02-05 | End: 2024-02-05

## 2024-02-05 RX ORDER — ALBUTEROL SULFATE 2.5 MG/3ML
2.5 SOLUTION RESPIRATORY (INHALATION) EVERY 4 HOURS PRN
Status: DISCONTINUED | OUTPATIENT
Start: 2024-02-05 | End: 2024-02-08

## 2024-02-05 NOTE — PROGRESS NOTES
[facility]     Hospitalist Progress Note     Tamy Wall Patient Status:  Inpatient    1980 MRN [id number]   Formerly [facility] 4NW-A Attending Cuong Pagan MD   Hosp Day # 3 PCP Karoline Burks DO     Chief Complaint: Ascites     Subjective:     Patient febrile, low urine ouput feels dizzy    Objective:    Review of Systems:   A comprehensive review of systems was completed; pertinent positive and negatives stated in subjective.    Vital signs:  Temp:  [98.1 °F (36.7 °C)-102.7 °F (39.3 °C)] 102.7 °F (39.3 °C)  Pulse:  [75-92] 92  Resp:  [16-18] 18  BP: (72-84)/(41-56) 84/45  SpO2:  [92 %-98 %] 98 %    Physical Exam:    General: No acute distress  Respiratory: No wheezes, no rhonchi  Cardiovascular: S1, S2, regular rate and rhythm  Abdomen: Soft, Non-tender, distended, positive bowel sounds  Neuro: No new focal deficits.   Extremities: No edema      Diagnostic Data:    Labs:  Recent Labs   Lab 24  0511 24  0850 24  1125 24  0703   WBC 11.1* 8.6  --  10.4 11.0   HGB 10.9* 8.9*  --  10.0* 9.9*   .5* 108.0*  --  109.5* 112.9*   .0* 104.0*  --  127.0* 195.0   INR  --   --  1.31*  --  1.44*       Recent Labs   Lab 24  0739 24  0703   * 95 88   BUN 3* 4* 9   CREATSERUM 0.36* 0.45* 0.46*   CA 8.1* 8.1* 7.9*   ALB 2.0* 2.0* 1.9*    138 136   K 3.7 3.8  3.8 3.4*  3.4*    107 105   CO2 26.0 28.0 27.0   ALKPHO 225* 216* 196*   AST 82* 100* 334*   ALT 38 39 64*   BILT 4.8* 4.4* 4.5*   TP 5.0* 5.2* 5.0*       Estimated Creatinine Clearance: 117.8 mL/min (A) (based on SCr of 0.46 mg/dL (L)).    No results for input(s): \"TROP\", \"TROPHS\", \"CK\" in the last 168 hours.    Recent Labs   Lab 24  0850 24  0703   PTP 16.4* 17.7*   INR 1.31* 1.44*                  Microbiology    Hospital Encounter on 24   1. Body Fluid Cult Aerobic and Anaerobic     Status: None (Preliminary result)    Collection  Time: 02/02/24 11:36 AM    Specimen: Ascites fluid; Body fluid, unspecified   Result Value Ref Range    Body Fluid Culture Result No Growth 2 Days N/A    Body Fluid Smear This is a cytocentrifuged smear. N/A    Body Fluid Smear 1+ WBCs seen N/A    Body Fluid Smear No organisms seen N/A   2. Urine Culture, Routine     Status: None    Collection Time: 02/01/24 10:55 PM    Specimen: Urine, clean catch   Result Value Ref Range    Urine Culture No Growth 2 Days N/A   3. Blood Culture     Status: None (Preliminary result)    Collection Time: 02/01/24 10:55 PM    Specimen: Blood,peripheral   Result Value Ref Range    Blood Culture Result No Growth 3 Days N/A         Imaging: Reviewed in Epic.    Medications:    potassium chloride  40 mEq Oral Q4H    oseltamivir  75 mg Oral Q12H    polyethylene glycol (PEG 3350)  17 g Oral Daily    [Held by provider] furosemide  20 mg Oral BID (Diuretic)    [Held by provider] spironolactone  75 mg Oral Daily    pantoprazole  40 mg Oral QAM AC    folic acid  1 mg Oral Daily    enoxaparin  40 mg Subcutaneous Daily    midodrine  2.5 mg Oral BID       Assessment & Plan:      #fever 2/4/24  Influenza +- tamiflu started  Gentle IVF- diuretics on hold on 2/4/24    #Abdominal distension, ascites likely due to underlying liver cirrhosis  US paracentesis  2/2- 1.3 L removed  Started Lasix on 2/3, aldactone- will hold      #Etoh cirrhosis, now with ascites, elevated LFTs and bilirubin    #Etoh abuse,     #Hypotension, monitor- chronic     #Anemia, thrombocytopenia, due to BM suppression from chronic Etoh abuse      Sosa Wood MD    Supplementary Documentation:     Quality:  DVT Mechanical Prophylaxis:   SCDs,    DVT Pharmacologic Prophylaxis   Medication    enoxaparin (Lovenox) 40 MG/0.4ML SUBQ injection 40 mg                Code Status: Prior  Larose: No urinary catheter in place  Larose Duration (in days):   Central line:    MICKY:     Discharge is dependent on: clinical   At this point Ms. Mariedon  is expected to be discharge to: home     The 21st Century Cures Act makes medical notes like these available to patients in the interest of transparency. Please be advised this is a medical document. Medical documents are intended to carry relevant information, facts as evident, and the clinical opinion of the practitioner. The medical note is intended as peer to peer communication and may appear blunt or direct. It is written in medical language and may contain abbreviations or verbiage that are unfamiliar.

## 2024-02-05 NOTE — PLAN OF CARE
Patient is alert, BP 84/45, temperature maximum 102.7 - PRN per MAR, MD Nina notified, orders received. IVF per MAR. Patient c/o pain - declined morphine, MD Nina notified - order received. Instructed patient to call for assistance with ambulation. Call light within reach.     Approximately 1755: Patient reports pink emesis and blood when she blows her nose - MD Nina notified, orders to hold lovenox.

## 2024-02-05 NOTE — PLAN OF CARE
A&Ox4, RA, on tele (NSR)  (+) influenza A (droplet precaution maintained) on PO tamiflu  Hypotensive w/LH - administered 500 ml bolus and 1x addtl dose of midodrine 2.5 mg.   SBP 80's/ DBP 50's  Lasix and spironolactone on hold.     Problem: CARDIOVASCULAR - ADULT  Goal: Maintains optimal cardiac output and hemodynamic stability  Description: INTERVENTIONS:  - Monitor vital signs, rhythm, and trends  - Monitor for bleeding, hypotension and signs of decreased cardiac output  - Evaluate effectiveness of vasoactive medications to optimize hemodynamic stability  - Monitor arterial and/or venous puncture sites for bleeding and/or hematoma  - Assess quality of pulses, skin color and temperature  - Assess for signs of decreased coronary artery perfusion - ex. Angina  - Evaluate fluid balance, assess for edema, trend weights  Outcome: Progressing  Goal: Absence of cardiac arrhythmias or at baseline  Description: INTERVENTIONS:  - Continuous cardiac monitoring, monitor vital signs, obtain 12 lead EKG if indicated  - Evaluate effectiveness of antiarrhythmic and heart rate control medications as ordered  - Initiate emergency measures for life threatening arrhythmias  - Monitor electrolytes and administer replacement therapy as ordered  Outcome: Progressing

## 2024-02-06 LAB
BASOPHILS # BLD AUTO: 0.05 X10(3) UL (ref 0–0.2)
BASOPHILS NFR BLD AUTO: 0.5 %
EOSINOPHIL # BLD AUTO: 0.01 X10(3) UL (ref 0–0.7)
EOSINOPHIL NFR BLD AUTO: 0.1 %
ERYTHROCYTE [DISTWIDTH] IN BLOOD BY AUTOMATED COUNT: 14.1 %
HCT VFR BLD AUTO: 27.8 %
HGB BLD-MCNC: 9.2 G/DL
IMM GRANULOCYTES # BLD AUTO: 0.08 X10(3) UL (ref 0–1)
IMM GRANULOCYTES NFR BLD: 0.8 %
LYMPHOCYTES # BLD AUTO: 1.71 X10(3) UL (ref 1–4)
LYMPHOCYTES NFR BLD AUTO: 17.4 %
MCH RBC QN AUTO: 36.5 PG (ref 26–34)
MCHC RBC AUTO-ENTMCNC: 33.1 G/DL (ref 31–37)
MCV RBC AUTO: 110.3 FL
MONOCYTES # BLD AUTO: 0.85 X10(3) UL (ref 0.1–1)
MONOCYTES NFR BLD AUTO: 8.7 %
NEUTROPHILS # BLD AUTO: 7.1 X10 (3) UL (ref 1.5–7.7)
NEUTROPHILS # BLD AUTO: 7.1 X10(3) UL (ref 1.5–7.7)
NEUTROPHILS NFR BLD AUTO: 72.5 %
PLATELET # BLD AUTO: 212 10(3)UL (ref 150–450)
RBC # BLD AUTO: 2.52 X10(6)UL
WBC # BLD AUTO: 9.8 X10(3) UL (ref 4–11)

## 2024-02-06 PROCEDURE — 99233 SBSQ HOSP IP/OBS HIGH 50: CPT | Performed by: STUDENT IN AN ORGANIZED HEALTH CARE EDUCATION/TRAINING PROGRAM

## 2024-02-06 RX ORDER — MIDODRINE HYDROCHLORIDE 2.5 MG/1
2.5 TABLET ORAL 2 TIMES DAILY
Qty: 60 TABLET | Refills: 0 | Status: SHIPPED | OUTPATIENT
Start: 2024-02-06 | End: 2024-02-07

## 2024-02-06 RX ORDER — MIDODRINE HYDROCHLORIDE 5 MG/1
5 TABLET ORAL ONCE
Status: COMPLETED | OUTPATIENT
Start: 2024-02-06 | End: 2024-02-06

## 2024-02-06 RX ORDER — OSELTAMIVIR PHOSPHATE 75 MG/1
75 CAPSULE ORAL EVERY 12 HOURS SCHEDULED
Qty: 4 CAPSULE | Refills: 0 | Status: SHIPPED | OUTPATIENT
Start: 2024-02-07 | End: 2024-02-08

## 2024-02-06 RX ORDER — BISACODYL 5 MG/1
5 TABLET, DELAYED RELEASE ORAL
Status: DISCONTINUED | OUTPATIENT
Start: 2024-02-06 | End: 2024-02-08

## 2024-02-06 RX ORDER — SPIRONOLACTONE 25 MG/1
25 TABLET ORAL ONCE
Status: COMPLETED | OUTPATIENT
Start: 2024-02-06 | End: 2024-02-06

## 2024-02-06 RX ORDER — FUROSEMIDE 20 MG/1
20 TABLET ORAL DAILY PRN
Qty: 30 TABLET | Refills: 0 | Status: SHIPPED | OUTPATIENT
Start: 2024-02-06 | End: 2024-02-06

## 2024-02-06 RX ORDER — OSELTAMIVIR PHOSPHATE 75 MG/1
75 CAPSULE ORAL EVERY 12 HOURS SCHEDULED
Qty: 5 CAPSULE | Refills: 0 | Status: SHIPPED | OUTPATIENT
Start: 2024-02-06 | End: 2024-02-06

## 2024-02-06 RX ORDER — FUROSEMIDE 20 MG/1
10 TABLET ORAL ONCE
Status: COMPLETED | OUTPATIENT
Start: 2024-02-06 | End: 2024-02-06

## 2024-02-06 RX ORDER — FOLIC ACID 1 MG/1
1 TABLET ORAL DAILY
Qty: 30 TABLET | Refills: 0 | Status: SHIPPED | OUTPATIENT
Start: 2024-02-06 | End: 2024-02-08

## 2024-02-06 NOTE — PLAN OF CARE
A&Ox4, RA, on tele (NSR)  (+) influenza A (droplet precaution maintained) on PO tamiflu  Hypotensive - administered 500 ml bolus   prn robitussin and tessalon for cough.     Problem: RISK FOR INFECTION - ADULT  Goal: Absence of fever/infection during anticipated neutropenic period  Description: INTERVENTIONS  - Monitor WBC  - Administer growth factors as ordered  - Implement neutropenic guidelines  Outcome: Progressing     Problem: CARDIOVASCULAR - ADULT  Goal: Maintains optimal cardiac output and hemodynamic stability  Description: INTERVENTIONS:  - Monitor vital signs, rhythm, and trends  - Monitor for bleeding, hypotension and signs of decreased cardiac output  - Evaluate effectiveness of vasoactive medications to optimize hemodynamic stability  - Monitor arterial and/or venous puncture sites for bleeding and/or hematoma  - Assess quality of pulses, skin color and temperature  - Assess for signs of decreased coronary artery perfusion - ex. Angina  - Evaluate fluid balance, assess for edema, trend weights  Outcome: Progressing  Goal: Absence of cardiac arrhythmias or at baseline  Description: INTERVENTIONS:  - Continuous cardiac monitoring, monitor vital signs, obtain 12 lead EKG if indicated  - Evaluate effectiveness of antiarrhythmic and heart rate control medications as ordered  - Initiate emergency measures for life threatening arrhythmias  - Monitor electrolytes and administer replacement therapy as ordered  Outcome: Progressing

## 2024-02-06 NOTE — PROGRESS NOTES
[facility]     Hospitalist Progress Note     Tamy Wall Patient Status:  Inpatient    1980 MRN [id number]   [facility] 4NW-A Attending Cuong Pagan MD   Hosp Day # 4 PCP Karoline Burks DO     Chief Complaint: Ascites     Subjective:     Patient received NSS bolus overnight.  Had N, Emesis . Fever.   Objective:    Review of Systems:   A comprehensive review of systems was completed; pertinent positive and negatives stated in subjective.    Vital signs:  Temp:  [98.8 °F (37.1 °C)-100.4 °F (38 °C)] 99.8 °F (37.7 °C)  Pulse:  [79-95] 79  Resp:  [17-23] 23  BP: (83-90)/(50-58) 83/50  SpO2:  [95 %-99 %] 99 %    Physical Exam:    General: No acute distress  Respiratory: No wheezes, no rhonchi  Cardiovascular: S1, S2, regular rate and rhythm  Abdomen: Soft, Non-tender, distended, positive bowel sounds  Neuro: No new focal deficits.   Extremities: No edema      Diagnostic Data:    Labs:  Recent Labs   Lab 24  19524  0511 24  0850 24  1125 24  0703 24  1019   WBC 11.1* 8.6  --  10.4 11.0 9.8   HGB 10.9* 8.9*  --  10.0* 9.9* 9.2*   .5* 108.0*  --  109.5* 112.9* 110.3*   .0* 104.0*  --  127.0* 195.0 212.0   INR  --   --  1.31*  --  1.44*  --        Recent Labs   Lab 24  1125 24  0739 24  0703 24  1750   * 95 88  --    BUN 3* 4* 9  --    CREATSERUM 0.36* 0.45* 0.46*  --    CA 8.1* 8.1* 7.9*  --    ALB 2.0* 2.0* 1.9*  --     138 136  --    K 3.7 3.8  3.8 3.4*  3.4* 4.4    107 105  --    CO2 26.0 28.0 27.0  --    ALKPHO 225* 216* 196*  --    AST 82* 100* 334*  --    ALT 38 39 64*  --    BILT 4.8* 4.4* 4.5*  --    TP 5.0* 5.2* 5.0*  --        Estimated Creatinine Clearance: 117.8 mL/min (A) (based on SCr of 0.46 mg/dL (L)).    No results for input(s): \"TROP\", \"TROPHS\", \"CK\" in the last 168 hours.    Recent Labs   Lab 24  0850 24  0703   PTP 16.4* 17.7*   INR 1.31* 1.44*                   Microbiology    Hospital Encounter on 02/01/24   1. Blood Culture     Status: None (Preliminary result)    Collection Time: 02/04/24  9:05 AM    Specimen: Blood,peripheral   Result Value Ref Range    Blood Culture Result No Growth 1 Day N/A   2. Body Fluid Cult Aerobic and Anaerobic     Status: None (Preliminary result)    Collection Time: 02/02/24 11:36 AM    Specimen: Ascites fluid; Body fluid, unspecified   Result Value Ref Range    Body Fluid Culture Result No Growth 3 Days N/A    Body Fluid Smear This is a cytocentrifuged smear. N/A    Body Fluid Smear 1+ WBCs seen N/A    Body Fluid Smear No organisms seen N/A   3. Urine Culture, Routine     Status: None    Collection Time: 02/01/24 10:55 PM    Specimen: Urine, clean catch   Result Value Ref Range    Urine Culture No Growth 2 Days N/A         Imaging: Reviewed in Epic.    Medications:    ketorolac  30 mg Intravenous Once    oseltamivir  75 mg Oral Q12H    polyethylene glycol (PEG 3350)  17 g Oral Daily    [Held by provider] furosemide  20 mg Oral BID (Diuretic)    [Held by provider] spironolactone  75 mg Oral Daily    pantoprazole  40 mg Oral QAM AC    folic acid  1 mg Oral Daily    [Held by provider] enoxaparin  40 mg Subcutaneous Daily    midodrine  2.5 mg Oral BID       Assessment & Plan:      #fever 2/4/24  Influenza +- tamiflu started  Gentle IVF- diuretics on hold on 2/4/24- do not think she will tolerate as OP given her low BP- will prescribe as PRN.    #Abdominal distension, ascites likely due to underlying liver cirrhosis  US paracentesis  2/2- 1.3 L removed  Started Lasix on 2/3, aldactone- see above  Will likely need OP paracentesis serially. Most recent abdominal US 2/5- minimal      #Etoh cirrhosis, now with ascites, elevated LFTs and bilirubin    #Etoh use disorder        #Hypotension, monitor- chronic   Has received NSS bolus while + influenza likely IVV depletion      #Anemia, thrombocytopenia, due to BM suppression from chronic Etoh  use      Medically cleared if GI ok with DC    Sosa Wood MD    Supplementary Documentation:     Quality:  DVT Mechanical Prophylaxis:   SCDs,    DVT Pharmacologic Prophylaxis   Medication    [Held by provider] enoxaparin (Lovenox) 40 MG/0.4ML SUBQ injection 40 mg                Code Status: Prior  Larose: No urinary catheter in place  Larose Duration (in days):   Central line:    MICKY: 2/6/2024    Discharge is dependent on: clinical   At this point Ms. Wall is expected to be discharge to: home     The 21st Century Cures Act makes medical notes like these available to patients in the interest of transparency. Please be advised this is a medical document. Medical documents are intended to carry relevant information, facts as evident, and the clinical opinion of the practitioner. The medical note is intended as peer to peer communication and may appear blunt or direct. It is written in medical language and may contain abbreviations or verbiage that are unfamiliar.

## 2024-02-06 NOTE — PROGRESS NOTES
Spoke with patient about residential treatment options she is determined to go into [facility] even after being told by her nurse,  and gateway  that her insurance will not pay for [facility]. At this point patient is undecided about what she wants to do. She requested a listing of treatment program, which she was given along with Mr. Gonzalez contact information in she is willing to go elsewhere.

## 2024-02-06 NOTE — PROGRESS NOTES
Psych Liaison contacted about pt being interested for [facility]- pt is not in network with any services. Pt recommended to meet with  again to explore options for residential or offered to make appointment for pt with [facility] for psychiatry/psychotherapy, pt open to meeting with  again.

## 2024-02-06 NOTE — SPIRITUAL CARE NOTE
Spiritual Care Visit Note    Patient Name: Tamy Wall Date of Spiritual Care Visit: 24   : 1980 Primary Dx: Other ascites       Referred By: Referral From: Other (Comment)    Spiritual Care Taxonomy:    Intended Effects: Demonstrate caring and concern    Methods: Offer support;Encourage sharing of feelings;Explore vamsi and values;Exploring hope    Interventions: Acknowledge response to difficult experience;Active listening;Ask guided questions;Ask questions to bring forth feelings;Discuss concerns;Communicate patient's needs/concerns to others;Silent prayer;Explain  role    Visit Type/Summary:         Spiritual Care support can be requested via an Epic consult. For urgent/immediate needs, please contact the On Call  at: [place]: ext [phone]

## 2024-02-06 NOTE — IMAGING NOTE
Call placed to [facility] regarding US Paracentesis for 2/7/24.  Diet: clear liquids are ok / no solid food 4 hours prior to procedure.  PT/INR with AM labs.  Ultrasound to obtain consent.  Hold lovenox the morning of procedure.

## 2024-02-06 NOTE — PROGRESS NOTES
Gastroenterology Progress Note  Patient Name: Tamy Wall  Chief Complaint: ascites, abdominal distension  S: She notes some abdominal discomfort from ascites.    O: BP (!) 88/53 (BP Location: Right arm)   Pulse 70   Temp 98.3 °F (36.8 °C) (Oral)   Resp 22   Ht 5' 1\" (1.549 m)   Wt 111 lb 1.6 oz (50.4 kg)   LMP 05/28/2023 (Approximate)   SpO2 98%   BMI 20.99 kg/m²   Gen: AAOx3  CV: RRR with normal S1 / S2  Resp: No increased respiratory effort  Abd: (+)BS, soft, non-tender,+distended; no rebound or guarding  Ext: No edema or cyanosis  Skin: Warm and dry  Laboratory Data:   Lab Results   Component Value Date    WBC 9.8 02/06/2024    HGB 9.2 02/06/2024    HCT 27.8 02/06/2024    .0 02/06/2024    K 4.4 02/05/2024     Recent Labs   Lab 02/03/24  1125 02/04/24  0739 02/05/24  0703 02/05/24  1750   * 95 88  --    BUN 3* 4* 9  --    CREATSERUM 0.36* 0.45* 0.46*  --    CA 8.1* 8.1* 7.9*  --     138 136  --    K 3.7 3.8  3.8 3.4*  3.4* 4.4    107 105  --    CO2 26.0 28.0 27.0  --      Recent Labs   Lab 02/06/24  1019   RBC 2.52*   HGB 9.2*   HCT 27.8*   .3*   MCH 36.5*   MCHC 33.1   RDW 14.1   NEPRELIM 7.10   WBC 9.8   .0       Recent Labs   Lab 02/03/24  1125 02/04/24  0739 02/05/24  0703   ALT 38 39 64*   AST 82* 100* 334*       Assessment: This is a patient with alcohol hepatitis and ascites.  She has had abdominal bloating and distention which briefly improved after paracentesis.  However, she has had increased bloating and distention but US did not show enough ascites for paracentesis.  She was also noted to have a fevers and was found to have influenza.  She reports small formed stool.  No diarrhea or bleeding. Her recent paracentesis revealed no SBP.    Plan:   1) Recommend Miralax daily  2) low sodium diet daily  3) Blood pressure has improved, trial of low dose Lasix and Aldactone today   4) Plan for paracentesis tomorrow; NPO after NIRAV Valdez,  DO  12:52 PM  2/6/2024  [facility]  [phone]

## 2024-02-07 ENCOUNTER — APPOINTMENT (OUTPATIENT)
Dept: ULTRASOUND IMAGING | Facility: HOSPITAL | Age: 44
End: 2024-02-07
Attending: INTERNAL MEDICINE
Payer: MEDICAID

## 2024-02-07 LAB
ALBUMIN SERPL-MCNC: 1.7 G/DL (ref 3.4–5)
ALBUMIN/GLOB SERPL: 0.6 {RATIO} (ref 1–2)
ALP LIVER SERPL-CCNC: 187 U/L
ALT SERPL-CCNC: 47 U/L
ANION GAP SERPL CALC-SCNC: 6 MMOL/L (ref 0–18)
AST SERPL-CCNC: 165 U/L (ref 15–37)
BASOPHILS NFR PRT: 0 %
BILIRUB SERPL-MCNC: 3.2 MG/DL (ref 0.1–2)
BUN BLD-MCNC: 5 MG/DL (ref 9–23)
CALCIUM BLD-MCNC: 7.8 MG/DL (ref 8.5–10.1)
CHLORIDE SERPL-SCNC: 106 MMOL/L (ref 98–112)
CO2 SERPL-SCNC: 25 MMOL/L (ref 21–32)
COLOR FLD: YELLOW
CREAT BLD-MCNC: 0.47 MG/DL
EGFRCR SERPLBLD CKD-EPI 2021: 120 ML/MIN/1.73M2 (ref 60–?)
EOSINOPHIL NFR PRT: 0 %
GLOBULIN PLAS-MCNC: 2.8 G/DL (ref 2.8–4.4)
GLUCOSE BLD-MCNC: 101 MG/DL (ref 70–99)
GLUCOSE PRT-MCNC: 108 MG/DL
HCT VFR FLD CALC: <5 %
INR BLD: 1.26 (ref 0.8–1.2)
LDH FLD L TO P-CCNC: 70 U/L
LYMPHOCYTES NFR PRT: 37 %
MESOTHL CELL NFR PRT: 11 %
MONOS+MACROS NFR PRT: 49 %
NEUTROPHILS PERITONEAL FLUID: 3 %
NON GYNE INTERPRETATION: NEGATIVE
OSMOLALITY SERPL CALC.SUM OF ELEC: 281 MOSM/KG (ref 275–295)
POTASSIUM SERPL-SCNC: 4 MMOL/L (ref 3.5–5.1)
PROT PRT-MCNC: 1.3 G/DL
PROT SERPL-MCNC: 4.5 G/DL (ref 6.4–8.2)
PROTHROMBIN TIME: 15.8 SECONDS (ref 11.6–14.8)
RBC PERITONEAL FLUID: <3000 /MM3
SODIUM SERPL-SCNC: 137 MMOL/L (ref 136–145)
TOTAL CELLS COUNTED FLD: 100
TURBIDITY CSF QL: CLEAR
WBC # PRT: 30 /MM3

## 2024-02-07 PROCEDURE — 99232 SBSQ HOSP IP/OBS MODERATE 35: CPT | Performed by: INTERNAL MEDICINE

## 2024-02-07 PROCEDURE — 49083 ABD PARACENTESIS W/IMAGING: CPT | Performed by: INTERNAL MEDICINE

## 2024-02-07 PROCEDURE — 0W9G3ZZ DRAINAGE OF PERITONEAL CAVITY, PERCUTANEOUS APPROACH: ICD-10-PCS | Performed by: RADIOLOGY

## 2024-02-07 RX ORDER — MIDODRINE HYDROCHLORIDE 10 MG/1
10 TABLET ORAL ONCE
Status: COMPLETED | OUTPATIENT
Start: 2024-02-07 | End: 2024-02-07

## 2024-02-07 RX ORDER — MIDODRINE HYDROCHLORIDE 5 MG/1
5 TABLET ORAL 2 TIMES DAILY
Status: DISCONTINUED | OUTPATIENT
Start: 2024-02-07 | End: 2024-02-08

## 2024-02-07 RX ORDER — MIDODRINE HYDROCHLORIDE 5 MG/1
5 TABLET ORAL 3 TIMES DAILY
Qty: 90 TABLET | Refills: 1 | Status: SHIPPED | OUTPATIENT
Start: 2024-02-07 | End: 2024-02-08

## 2024-02-07 RX ORDER — ALBUMIN (HUMAN) 12.5 G/50ML
25 SOLUTION INTRAVENOUS ONCE
Status: COMPLETED | OUTPATIENT
Start: 2024-02-07 | End: 2024-02-07

## 2024-02-07 RX ORDER — ACYCLOVIR 50 MG/G
OINTMENT TOPICAL
Status: DISCONTINUED | OUTPATIENT
Start: 2024-02-07 | End: 2024-02-08

## 2024-02-07 RX ORDER — ALBUMIN (HUMAN) 12.5 G/50ML
25 SOLUTION INTRAVENOUS ONCE
Status: DISCONTINUED | OUTPATIENT
Start: 2024-02-07 | End: 2024-02-07

## 2024-02-07 NOTE — PROGRESS NOTES
Gastroenterology Progress Note  Patient Name: Tamy Wall  Chief Complaint: ascites, abdominal distension  S: She notes some abdominal discomfort. She had paracentesis with   610 ml removed and fluid sent for analysis. She reports some dizziness yesterday.   O: BP (!) 83/51   Pulse 72   Temp 98.7 °F (37.1 °C)   Resp 21   Ht 5' 1\" (1.549 m)   Wt 111 lb 1.6 oz (50.4 kg)   LMP 05/28/2023 (Approximate)   SpO2 99%   BMI 20.99 kg/m²   Gen: AAOx3  CV: RRR with normal S1 / S2  Resp: No increased respiratory effort  Abd: (+)BS, soft, non-tender, less distended; no rebound or guarding  Ext: No edema or cyanosis  Skin: Warm and dry  Laboratory Data:   Lab Results   Component Value Date    CREATSERUM 0.47 02/07/2024    BUN 5 02/07/2024     02/07/2024    K 4.0 02/07/2024     02/07/2024    CO2 25.0 02/07/2024     02/07/2024    CA 7.8 02/07/2024    ALB 1.7 02/07/2024    ALKPHO 187 02/07/2024    BILT 3.2 02/07/2024     02/07/2024    ALT 47 02/07/2024    INR 1.26 02/07/2024    PTP 15.8 02/07/2024     Recent Labs   Lab 02/04/24  0739 02/05/24  0703 02/05/24  1750 02/07/24  0614   GLU 95 88  --  101*   BUN 4* 9  --  5*   CREATSERUM 0.45* 0.46*  --  0.47*   CA 8.1* 7.9*  --  7.8*    136  --  137   K 3.8  3.8 3.4*  3.4* 4.4 4.0    105  --  106   CO2 28.0 27.0  --  25.0     Recent Labs   Lab 02/06/24  1019   RBC 2.52*   HGB 9.2*   HCT 27.8*   .3*   MCH 36.5*   MCHC 33.1   RDW 14.1   NEPRELIM 7.10   WBC 9.8   .0       Recent Labs   Lab 02/04/24  0739 02/05/24  0703 02/07/24  0614   ALT 39 64* 47   * 334* 165*       Assessment: This is a patient with alcohol hepatitis and ascites.  She has had abdominal bloating and distention which briefly improved after paracentesis.  However, she has had increased bloating and underwent repeat paracentesis with only 610 ml removed.  She was also noted to have a fevers and was found to have influenza.  She reports small formed stool.   No diarrhea or bleeding.   Plan:   1) Recommend Miralax daily  2) low sodium diet daily: discussed at length  3) Spoke with Dr. Bull and given pt low blood pressure and dizziness, cannot given diuretics on discharge  4) Recommend referral to transplant center as outpatient given young age and decompensated cirrhosis    As long as fluid analysis is negative for SBP, she can be discharged from a Gi standpoint    Halley Valdez DO  2:23 PM  2/7/2024  [facility]  [phone]

## 2024-02-07 NOTE — PROGRESS NOTES
[facility]     Hospitalist Progress Note     Tamy Wall Patient Status:  Inpatient    1980 MRN [id number]   [facility] 4NW-A Attending Li Bull MD   Hosp Day # 5 PCP Karoline Burks DO     Chief Complaint: abd discomfort     Subjective:     Patient with coughing overnight and she could not sleep.  She has a lot of abdominal discomfort that feels like a stretching her torso.    Objective:    Review of Systems:   A comprehensive review of systems was completed; pertinent positive and negatives stated in subjective.    Vital signs:  Temp:  [98.5 °F (36.9 °C)-99.1 °F (37.3 °C)] 98.7 °F (37.1 °C)  Pulse:  [60-74] 72  Resp:  [17-22] 21  BP: (81-85)/(41-55) 83/51  SpO2:  [96 %-99 %] 99 %    Physical Exam:    General: No acute distress  Respiratory: No wheezes, no rhonchi  Cardiovascular: S1, S2, regular rate and rhythm  Abdomen: Soft, distended and tender throughout.  Neuro: No new focal deficits.   Extremities: No edema      Diagnostic Data:    Labs:  Recent Labs   Lab 24  0511 24  0850 24  1125 24  0703 24  1019 24  0614   WBC 11.1* 8.6  --  10.4 11.0 9.8  --    HGB 10.9* 8.9*  --  10.0* 9.9* 9.2*  --    .5* 108.0*  --  109.5* 112.9* 110.3*  --    .0* 104.0*  --  127.0* 195.0 212.0  --    INR  --   --  1.31*  --  1.44*  --  1.26*       Recent Labs   Lab 24  0739 24  0703 24  1750 24  0614   GLU 95 88  --  101*   BUN 4* 9  --  5*   CREATSERUM 0.45* 0.46*  --  0.47*   CA 8.1* 7.9*  --  7.8*   ALB 2.0* 1.9*  --  1.7*    136  --  137   K 3.8  3.8 3.4*  3.4* 4.4 4.0    105  --  106   CO2 28.0 27.0  --  25.0   ALKPHO 216* 196*  --  187*   * 334*  --  165*   ALT 39 64*  --  47   BILT 4.4* 4.5*  --  3.2*   TP 5.2* 5.0*  --  4.5*       Estimated Creatinine Clearance: 115.3 mL/min (A) (based on SCr of 0.47 mg/dL (L)).    No results for input(s): \"TROP\", \"TROPHS\", \"CK\" in the last 168  hours.    Recent Labs   Lab 02/02/24  0850 02/05/24  0703 02/07/24  0614   PTP 16.4* 17.7* 15.8*   INR 1.31* 1.44* 1.26*                  Microbiology    Hospital Encounter on 02/01/24   1. Blood Culture     Status: None (Preliminary result)    Collection Time: 02/04/24  9:05 AM    Specimen: Blood,peripheral   Result Value Ref Range    Blood Culture Result No Growth 3 Days N/A   2. Body Fluid Cult Aerobic and Anaerobic     Status: None (Preliminary result)    Collection Time: 02/02/24 11:36 AM    Specimen: Ascites fluid; Body fluid, unspecified   Result Value Ref Range    Body Fluid Culture Result No Growth 4 Days N/A    Body Fluid Smear This is a cytocentrifuged smear. N/A    Body Fluid Smear 1+ WBCs seen N/A    Body Fluid Smear No organisms seen N/A   3. Urine Culture, Routine     Status: None    Collection Time: 02/01/24 10:55 PM    Specimen: Urine, clean catch   Result Value Ref Range    Urine Culture No Growth 2 Days N/A         Imaging: Reviewed in Epic.    Medications:    midodrine  5 mg Oral BID    albumin human  25 g Intravenous Once    ketorolac  30 mg Intravenous Once    oseltamivir  75 mg Oral Q12H    polyethylene glycol (PEG 3350)  17 g Oral Daily    furosemide  20 mg Oral BID (Diuretic)    [Held by provider] spironolactone  75 mg Oral Daily    pantoprazole  40 mg Oral QAM AC    folic acid  1 mg Oral Daily    enoxaparin  40 mg Subcutaneous Daily       Assessment & Plan:      # Alcoholic hepatitis with ascites  - plan for para today  - albumin post para  - GI following   - lasix and aldactone held due to hypotension    # Hypotension, less likely septic  - increase midodrine to 5 mg 3 times daily    # Fever due to influenza A  -Fever has resolved  -Completed Tamiflu    # Macrocytic anemia  # Hypokalemia, resolved  # Transaminitis due to alcoholic hepatitis  - monitor   - going to rehab post dc     Stable discharge planning today versus tomorrow per course    Li Bull MD    Supplementary  Documentation:     Quality:  DVT Mechanical Prophylaxis:   SCDs,    DVT Pharmacologic Prophylaxis   Medication    enoxaparin (Lovenox) 40 MG/0.4ML SUBQ injection 40 mg                Code Status: Prior  Laroes: No urinary catheter in place  Larose Duration (in days):   Central line:    MICKY:     Discharge is dependent on: Course  At this point Ms. Wall is expected to be discharge to: Home    The 21st Century Cures Act makes medical notes like these available to patients in the interest of transparency. Please be advised this is a medical document. Medical documents are intended to carry relevant information, facts as evident, and the clinical opinion of the practitioner. The medical note is intended as peer to peer communication and may appear blunt or direct. It is written in medical language and may contain abbreviations or verbiage that are unfamiliar.

## 2024-02-07 NOTE — PLAN OF CARE
Pt a/o x4. Continued hypotensive. MD notified. New order carried out. Other VSS on RA. C/o pain & cough, prns given. Meds per MAR. Plan for paracentesis 2/7.     Call light within reach.     x1 midodrine 5mg given @ 2052. Still hypotensive this AM, looking back at charting pt has been hypotensive since 2/4. MD updated. New order carried out, x1 midodrine 10mg.

## 2024-02-07 NOTE — PROCEDURES
PROCEDURE: US PARACENTESIS W IMAGING  (CPT=49083)    COMPARISON: None.    INDICATIONS: ascites    DESCRIPTION OF PROCEDURE: The risks, benefits, and alternatives of the procedure were explained to the patient and informed written and verbal consent was documented in the chart. Time out was performed by the staff. Potential complications including bleeding and infection were discussed with the patient and they related understanding. After obtaining informed consent, an ultrasound-guided paracentesis was performed in the usual sterile manner. The right lower quadrant abdominal wall was prepped and draped in sterile fashion and 1% lidocaine was used for local anesthetic.    LOCATION: Right lower quadrant  FLUID REMOVED: 610 cc of straw-colored ascites  MEDICATION: 10 cc of 1% Lidocaine for local anesthetic.  COMPLICATIONS: None.  LABORATORY: Specimen submitted to pathology for further analysis.    CONCLUSION: Ultrasound-guided paracentesis was performed without complication.

## 2024-02-08 VITALS
OXYGEN SATURATION: 99 % | RESPIRATION RATE: 16 BRPM | HEIGHT: 61 IN | DIASTOLIC BLOOD PRESSURE: 60 MMHG | TEMPERATURE: 98 F | HEART RATE: 76 BPM | WEIGHT: 111.13 LBS | BODY MASS INDEX: 20.98 KG/M2 | SYSTOLIC BLOOD PRESSURE: 89 MMHG

## 2024-02-08 LAB
ALBUMIN SERPL-MCNC: 2 G/DL (ref 3.4–5)
ALBUMIN/GLOB SERPL: 0.7 {RATIO} (ref 1–2)
ALP LIVER SERPL-CCNC: 165 U/L
ALT SERPL-CCNC: 41 U/L
ANION GAP SERPL CALC-SCNC: 7 MMOL/L (ref 0–18)
AST SERPL-CCNC: 129 U/L (ref 15–37)
BILIRUB SERPL-MCNC: 3.1 MG/DL (ref 0.1–2)
BUN BLD-MCNC: 6 MG/DL (ref 9–23)
CALCIUM BLD-MCNC: 7.7 MG/DL (ref 8.5–10.1)
CHLORIDE SERPL-SCNC: 106 MMOL/L (ref 98–112)
CO2 SERPL-SCNC: 24 MMOL/L (ref 21–32)
CREAT BLD-MCNC: 0.49 MG/DL
EGFRCR SERPLBLD CKD-EPI 2021: 119 ML/MIN/1.73M2 (ref 60–?)
GLOBULIN PLAS-MCNC: 2.7 G/DL (ref 2.8–4.4)
GLUCOSE BLD-MCNC: 133 MG/DL (ref 70–99)
OSMOLALITY SERPL CALC.SUM OF ELEC: 284 MOSM/KG (ref 275–295)
PHOSPHATIDYETHANOL: POSITIVE
PHOSPHATIDYLETHANOL (PETH): 647 NG/ML
POTASSIUM SERPL-SCNC: 3.9 MMOL/L (ref 3.5–5.1)
PROT SERPL-MCNC: 4.7 G/DL (ref 6.4–8.2)
SODIUM SERPL-SCNC: 137 MMOL/L (ref 136–145)

## 2024-02-08 PROCEDURE — 99239 HOSP IP/OBS DSCHRG MGMT >30: CPT | Performed by: INTERNAL MEDICINE

## 2024-02-08 RX ORDER — MIDODRINE HYDROCHLORIDE 10 MG/1
10 TABLET ORAL 3 TIMES DAILY
Qty: 90 TABLET | Refills: 1 | Status: ON HOLD | OUTPATIENT
Start: 2024-02-08

## 2024-02-08 RX ORDER — FOLIC ACID 1 MG/1
1 TABLET ORAL DAILY
Qty: 30 TABLET | Refills: 2 | Status: ON HOLD | OUTPATIENT
Start: 2024-02-08

## 2024-02-08 RX ORDER — MELATONIN
100 2 TIMES DAILY
Qty: 60 TABLET | Refills: 0 | Status: ON HOLD | OUTPATIENT
Start: 2024-02-08

## 2024-02-08 RX ORDER — PANTOPRAZOLE SODIUM 40 MG/1
40 TABLET, DELAYED RELEASE ORAL
Qty: 90 TABLET | Refills: 1 | Status: ON HOLD | OUTPATIENT
Start: 2024-02-08

## 2024-02-08 RX ORDER — OSELTAMIVIR PHOSPHATE 75 MG/1
75 CAPSULE ORAL EVERY 12 HOURS SCHEDULED
Qty: 3 CAPSULE | Refills: 0 | Status: SHIPPED | OUTPATIENT
Start: 2024-02-08 | End: 2024-02-18 | Stop reason: ALTCHOICE

## 2024-02-08 RX ORDER — FUROSEMIDE 20 MG/1
20 TABLET ORAL DAILY
Qty: 60 TABLET | Refills: 1 | Status: ON HOLD | OUTPATIENT
Start: 2024-02-08

## 2024-02-08 RX ORDER — MIDODRINE HYDROCHLORIDE 10 MG/1
10 TABLET ORAL 3 TIMES DAILY
Status: DISCONTINUED | OUTPATIENT
Start: 2024-02-08 | End: 2024-02-08

## 2024-02-08 NOTE — PLAN OF CARE
Pt alert/oriented x 4.  BP 80s/40s.  Paracenthesis done today, 610ml removed.  Albumin given post procedure.  Complaints of abdominal and back pain.  Tylenol given with moderate control.  Toradol offered, declined, stated \"I want to hold off.\"  Seen by Dr. Bull.  Isolation for influenza, lungs diminished bilaterally.  Nonproductive cough.  Denies shortness of breath, robitussin and tessalon given as ordered. Afebrile on day shift. Up to/from bathroom as tolerated, gait steady.  Updated pt re: plan of care, verbalizes understanding. Isolation and safety measures in place.     Problem: GASTROINTESTINAL - ADULT  Goal: Maintains or returns to baseline bowel function  Description: INTERVENTIONS:  - Assess bowel function  - Maintain adequate hydration with IV or PO as ordered and tolerated  - Evaluate effectiveness of GI medications  - Encourage mobilization and activity  - Obtain nutritional consult as needed  - Establish a toileting routine/schedule  - Consider collaborating with pharmacy to review patient's medication profile  Outcome: Not Progressing     Problem: METABOLIC/FLUID AND ELECTROLYTES - ADULT  Goal: Electrolytes maintained within normal limits  Description: INTERVENTIONS:  - Monitor labs and rhythm and assess patient for signs and symptoms of electrolyte imbalances  - Administer electrolyte replacement as ordered  - Monitor response to electrolyte replacements, including rhythm and repeat lab results as appropriate  - Fluid restriction as ordered  - Instruct patient on fluid and nutrition restrictions as appropriate  Outcome: Progressing  Goal: Hemodynamic stability and optimal renal function maintained  Description: INTERVENTIONS:  - Monitor labs and assess for signs and symptoms of volume excess or deficit  - Monitor intake, output and patient weight  - Monitor urine specific gravity, serum osmolarity and serum sodium as indicated or ordered  - Monitor response to interventions for patient's volume  status, including labs, urine output, blood pressure (other measures as available)  - Encourage oral intake as appropriate  - Instruct patient on fluid and nutrition restrictions as appropriate  Outcome: Progressing     Problem: RISK FOR INFECTION - ADULT  Goal: Absence of fever/infection during anticipated neutropenic period  Description: INTERVENTIONS  - Monitor WBC  - Administer growth factors as ordered  - Implement neutropenic guidelines  Outcome: Progressing     Problem: PAIN - ADULT  Goal: Verbalizes/displays adequate comfort level or patient's stated pain goal  Description: INTERVENTIONS:  - Encourage pt to monitor pain and request assistance  - Assess pain using appropriate pain scale  - Administer analgesics based on type and severity of pain and evaluate response  - Implement non-pharmacological measures as appropriate and evaluate response  - Consider cultural and social influences on pain and pain management  - Manage/alleviate anxiety  - Utilize distraction and/or relaxation techniques  - Monitor for opioid side effects  - Notify MD/LIP if interventions unsuccessful or patient reports new pain  - Anticipate increased pain with activity and pre-medicate as appropriate  Outcome: Progressing     Problem: CARDIOVASCULAR - ADULT  Goal: Maintains optimal cardiac output and hemodynamic stability  Description: INTERVENTIONS:  - Monitor vital signs, rhythm, and trends  - Monitor for bleeding, hypotension and signs of decreased cardiac output  - Evaluate effectiveness of vasoactive medications to optimize hemodynamic stability  - Monitor arterial and/or venous puncture sites for bleeding and/or hematoma  - Assess quality of pulses, skin color and temperature  - Assess for signs of decreased coronary artery perfusion - ex. Angina  - Evaluate fluid balance, assess for edema, trend weights  Outcome: Not Progressing

## 2024-02-08 NOTE — DIETARY NOTE
[facility]   CLINICAL NUTRITION    Tamy Wall     Admitting diagnosis:  Other ascites [R18.8]    Ht: 154.9 cm (5' 1\")  Wt: 50.4 kg (111 lb 1.6 oz).   Body mass index is 20.99 kg/m².  IBW: 47.7 kg    Wt Readings from Last 6 Encounters:   02/04/24 50.4 kg (111 lb 1.6 oz)   06/29/23 47.6 kg (105 lb)   06/22/23 44.9 kg (99 lb)   06/21/23 46.5 kg (102 lb 8 oz)   12/03/19 52.6 kg (116 lb)   02/04/19 51.7 kg (114 lb)        Labs/Meds reviewed       Diet:       Procedures    Low Fiber/Soft diet Low Fiber/Soft; Sodium Restriction: 1.5 GM NA; Is Patient on Accuchecks? No     Percent Meals Eaten (last 3 days)       Date/Time Percent Meals Eaten (%)    02/05/24 1250 25 %    02/05/24 2054 0 %    02/07/24 0901 100 %    02/07/24 1300 100 %            43 yr old female admitted w/ ascites. PMH includes alcohol abuse, hepatitis, and uterine fibroid. Paracentesis done on 2/2 removing 1.3 L of fluid, and on 2/7 removing 610 ml. Unable to see pt d/t isolation precautions.      Pt chart reviewed d/t length of stay.  Per EMR, patient reports good appetite at this time.  Nursing notes reports Percent Meals Eaten (%): 100 % intake for last meal.  Tolerating po diet without diarrhea, emesis, or constipation.   Noted wt going up, likely due to fluid.     Patient is at low nutrition risk at this time.    Please consult if patient status changes or nutrition issues arise.    Scott Irizrary  Dietetic intern

## 2024-02-08 NOTE — DISCHARGE SUMMARY
[facility]     Tamy Wall Patient Status:  Inpatient    1980 MRN [id number]   Location [facility] 4NW-A Attending No att. providers found   Hosp Day # 6 PCP Karoline Burks DO     Date of Admission: 2024  Date of Discharge:  2024     Discharge Disposition: Home or Self Care    Discharge Diagnosis:    #Alcoholic hepatitis with ascites  - sp  para in house and will need outpatient para set up. D.w GI team   - albumin given   - lasix and aldactone held due to hypotension, resume lasix cautiously      # Hypotension, not septic   - continue midodrine      # Fever due to influenza A  -Fever has resolved  -Completed Tamiflu     # Macrocytic anemia  # Hypokalemia, resolved  # Transaminitis due to alcoholic hepatitis  - monitor   - going to rehab post dc   - HEPATOLOGY EVAL as outaptient        History of Present Illness: Tamy Wall is a 43 year old female with past medical history significant for EtOH abuse and hepatitis as well as uterine fibroid presents to the ER with complaints of abdominal distention and possible ascites.  Patient states she noticed her abdomen was more distended for the past 5 days.  She also states she gained about 10 pounds.  She also noticed that she was jaundiced.  She states that she recently stopped drinking alcohol.  Her last drink was about 3 months ago.  She was admitted to the hospital in  for EtOH hepatitis and was seen by GI and underwent an EGD.  At that time, her LFTs were also elevated with an elevated bilirubin.  Her Madrey DF score was 13.6 and therefore she was not a candidate for corticosteroids.  Autoimmune workup including NICANOR, ASMA, IgG were negative.  Right upper quadrant Doppler was negative for thrombus.  She was encouraged to stop drinking and discharged home.  She was also found to have gastritis on EGD was DC'd on a PPI twice daily.       Brief Synopsis:  This is a patient with alcohol hepatitis and ascites.  She has  had abdominal bloating and distention which briefly improved after paracentesis.  she was treated for her Influenza and clinically has improved.  However she will require periodic paracentesis as outpatient.  This was discussed with patient as well.  She will need to follow-up with hepatology as well.  She is strongly advised on alcohol cessation.    Lace+ Score: 47  59-90 High Risk  29-58 Medium Risk  0-28   Low Risk       TCM Follow-Up Recommendation:  LACE > 58: High Risk of readmission after discharge from the hospital.      Consultants:  GI    Discharge Medication List:     Discharge Medications        START taking these medications        Instructions Prescription details   furosemide 20 MG Tabs  Commonly known as: Lasix      Take 1 tablet (20 mg total) by mouth daily. Take if blood pressure >90/60   Quantity: 60 tablet  Refills: 1     oseltamivir 75 MG Caps  Commonly known as: Tamiflu      Take 1 capsule (75 mg total) by mouth every 12 (twelve) hours for 3 doses.   Stop taking on: February 10, 2024  Quantity: 3 capsule  Refills: 0            CHANGE how you take these medications        Instructions Prescription details   midodrine 10 MG Tabs  Commonly known as: ProAmatine  What changed:   medication strength  how much to take  when to take this      Take 1 tablet (10 mg total) by mouth in the morning and 1 tablet (10 mg total) at noon and 1 tablet (10 mg total) in the evening.   Quantity: 90 tablet  Refills: 1            CONTINUE taking these medications        Instructions Prescription details   folic acid 1 MG Tabs  Commonly known as: Folvite      Take 1 tablet (1 mg total) by mouth daily.   Quantity: 30 tablet  Refills: 2     pantoprazole 40 MG Tbec  Commonly known as: Protonix      Take 1 tablet (40 mg total) by mouth every morning before breakfast.   Quantity: 90 tablet  Refills: 1     thiamine 100 MG Tabs  Commonly known as: Vitamin B1      Take 1 tablet (100 mg total) by mouth in the morning and 1  tablet (100 mg total) before bedtime.   Quantity: 60 tablet  Refills: 0     Vitamin D3 1.25 MG (82209 UT) Caps      Take 1 tablet by mouth once a week.   Quantity: 12 capsule  Refills: 0            STOP taking these medications      naltrexone 50 MG Tabs  Commonly known as: ReVia        Prenatal 27-0.8 MG Tabs        sertraline 50 MG Tabs  Commonly known as: Zoloft                  Where to Get Your Medications        These medications were sent to [id number] - [facility] [address] [facility] [address], [phone], [phone] [address] [facility], [facility] [address]      Phone: [phone]   folic acid 1 MG Tabs  furosemide 20 MG Tabs  midodrine 10 MG Tabs  oseltamivir 75 MG Caps  pantoprazole 40 MG Tbec  thiamine 100 MG Tabs         [place]    Follow-up appointment:   Karoline Burks DO  [address] [place] [address]  [address]  [facility] [address] [phone]    Follow up in 2 week(s)  Follow up    Dejon Ramsay MD  [address]  [facility] [address] [phone]    Follow up in 2 week(s)      Familia Perez MD  [address]  [facility] [address]  [facility] [address] [phone]    Schedule an appointment as soon as possible for a visit      Appointments for Next 30 Days 2/10/2024 - 3/11/2024      None            Vital signs:       Physical Exam:    General: No acute distress   Lungs: clear to auscultation  Cardiovascular: S1, S2  Abdomen: Soft distended     -----------------------------------------------------------------------------------------------  PATIENT DISCHARGE INSTRUCTIONS: See electronic chart    Li Bull MD    Total time spent on discharge plannin minutes     The  [employer] Cures Act makes medical notes like these available to patients in the interest of transparency. Please be advised this is a medical document. Medical documents are intended to carry relevant information, facts as evident, and the clinical opinion of the practitioner. The  medical note is intended as peer to peer communication and may appear blunt or direct. It is written in medical language and may contain abbreviations or verbiage that are unfamiliar.

## 2024-02-08 NOTE — PLAN OF CARE
Pt received A&Ox4. RA. BP 80s/40s. Afebrile. Pt c/o cough, medications given per MAR. Call light within reach. Up & ad santosh.     Problem: GASTROINTESTINAL - ADULT  Goal: Maintains or returns to baseline bowel function  Description: INTERVENTIONS:  - Assess bowel function  - Maintain adequate hydration with IV or PO as ordered and tolerated  - Evaluate effectiveness of GI medications  - Encourage mobilization and activity  - Obtain nutritional consult as needed  - Establish a toileting routine/schedule  - Consider collaborating with pharmacy to review patient's medication profile  Outcome: Progressing     Problem: METABOLIC/FLUID AND ELECTROLYTES - ADULT  Goal: Electrolytes maintained within normal limits  Description: INTERVENTIONS:  - Monitor labs and rhythm and assess patient for signs and symptoms of electrolyte imbalances  - Administer electrolyte replacement as ordered  - Monitor response to electrolyte replacements, including rhythm and repeat lab results as appropriate  - Fluid restriction as ordered  - Instruct patient on fluid and nutrition restrictions as appropriate  Outcome: Progressing  Goal: Hemodynamic stability and optimal renal function maintained  Description: INTERVENTIONS:  - Monitor labs and assess for signs and symptoms of volume excess or deficit  - Monitor intake, output and patient weight  - Monitor urine specific gravity, serum osmolarity and serum sodium as indicated or ordered  - Monitor response to interventions for patient's volume status, including labs, urine output, blood pressure (other measures as available)  - Encourage oral intake as appropriate  - Instruct patient on fluid and nutrition restrictions as appropriate  Outcome: Progressing     Problem: RISK FOR INFECTION - ADULT  Goal: Absence of fever/infection during anticipated neutropenic period  Description: INTERVENTIONS  - Monitor WBC  - Administer growth factors as ordered  - Implement neutropenic guidelines  Outcome:  Progressing     Problem: PAIN - ADULT  Goal: Verbalizes/displays adequate comfort level or patient's stated pain goal  Description: INTERVENTIONS:  - Encourage pt to monitor pain and request assistance  - Assess pain using appropriate pain scale  - Administer analgesics based on type and severity of pain and evaluate response  - Implement non-pharmacological measures as appropriate and evaluate response  - Consider cultural and social influences on pain and pain management  - Manage/alleviate anxiety  - Utilize distraction and/or relaxation techniques  - Monitor for opioid side effects  - Notify MD/LIP if interventions unsuccessful or patient reports new pain  - Anticipate increased pain with activity and pre-medicate as appropriate  Outcome: Progressing     Problem: CARDIOVASCULAR - ADULT  Goal: Maintains optimal cardiac output and hemodynamic stability  Description: INTERVENTIONS:  - Monitor vital signs, rhythm, and trends  - Monitor for bleeding, hypotension and signs of decreased cardiac output  - Evaluate effectiveness of vasoactive medications to optimize hemodynamic stability  - Monitor arterial and/or venous puncture sites for bleeding and/or hematoma  - Assess quality of pulses, skin color and temperature  - Assess for signs of decreased coronary artery perfusion - ex. Angina  - Evaluate fluid balance, assess for edema, trend weights  Outcome: Progressing  Goal: Absence of cardiac arrhythmias or at baseline  Description: INTERVENTIONS:  - Continuous cardiac monitoring, monitor vital signs, obtain 12 lead EKG if indicated  - Evaluate effectiveness of antiarrhythmic and heart rate control medications as ordered  - Initiate emergency measures for life threatening arrhythmias  - Monitor electrolytes and administer replacement therapy as ordered  Outcome: Progressing

## 2024-02-08 NOTE — PLAN OF CARE
Pt alert/oriented x 4.  BP upper 80s/60s. Complaints of headache, denies dizziness/lightheadedness, abdominal and back pain.  Tylenol given.  S/p paracenthesis yesterday 2/8, paracenthesis site RLQ with 2x2 and tegaderm.  Dressing clean dry and intact.  Ascites noted, abdomen slightly firm, tender upon palpation.  Bowel sounds active.  Appetite fair, denies nausea/vomiting. Lungs clear, nonproductive cough.  Denies shortness of breath.  Droplet isolation for influenza. Afebrile.  Seen by vivi Patterson for discharge home today per Dr. Bull and José Miguel.  Pt to schedule paracenthesis as outpatient.  BP monitored.  Scheduled meds given per MAR.  Adequate control of pain with tylenol.  Updated pt re: plan of care, discharge plans.  Verbalizes understanding.  Needs addressed, safety and isolation precautions in place.     Problem: GASTROINTESTINAL - ADULT  Goal: Maintains or returns to baseline bowel function  Description: INTERVENTIONS:  - Assess bowel function  - Maintain adequate hydration with IV or PO as ordered and tolerated  - Evaluate effectiveness of GI medications  - Encourage mobilization and activity  - Obtain nutritional consult as needed  - Establish a toileting routine/schedule  - Consider collaborating with pharmacy to review patient's medication profile  Outcome: Progressing     Problem: METABOLIC/FLUID AND ELECTROLYTES - ADULT  Goal: Electrolytes maintained within normal limits  Description: INTERVENTIONS:  - Monitor labs and rhythm and assess patient for signs and symptoms of electrolyte imbalances  - Administer electrolyte replacement as ordered  - Monitor response to electrolyte replacements, including rhythm and repeat lab results as appropriate  - Fluid restriction as ordered  - Instruct patient on fluid and nutrition restrictions as appropriate  Outcome: Progressing  Goal: Hemodynamic stability and optimal renal function maintained  Description: INTERVENTIONS:  - Monitor labs and assess for  signs and symptoms of volume excess or deficit  - Monitor intake, output and patient weight  - Monitor urine specific gravity, serum osmolarity and serum sodium as indicated or ordered  - Monitor response to interventions for patient's volume status, including labs, urine output, blood pressure (other measures as available)  - Encourage oral intake as appropriate  - Instruct patient on fluid and nutrition restrictions as appropriate  Outcome: Progressing     Problem: RISK FOR INFECTION - ADULT  Goal: Absence of fever/infection during anticipated neutropenic period  Description: INTERVENTIONS  - Monitor WBC  - Administer growth factors as ordered  - Implement neutropenic guidelines  Outcome: Progressing     Problem: PAIN - ADULT  Goal: Verbalizes/displays adequate comfort level or patient's stated pain goal  Description: INTERVENTIONS:  - Encourage pt to monitor pain and request assistance  - Assess pain using appropriate pain scale  - Administer analgesics based on type and severity of pain and evaluate response  - Implement non-pharmacological measures as appropriate and evaluate response  - Consider cultural and social influences on pain and pain management  - Manage/alleviate anxiety  - Utilize distraction and/or relaxation techniques  - Monitor for opioid side effects  - Notify MD/LIP if interventions unsuccessful or patient reports new pain  - Anticipate increased pain with activity and pre-medicate as appropriate  Outcome: Progressing     Problem: CARDIOVASCULAR - ADULT  Goal: Maintains optimal cardiac output and hemodynamic stability  Description: INTERVENTIONS:  - Monitor vital signs, rhythm, and trends  - Monitor for bleeding, hypotension and signs of decreased cardiac output  - Evaluate effectiveness of vasoactive medications to optimize hemodynamic stability  - Monitor arterial and/or venous puncture sites for bleeding and/or hematoma  - Assess quality of pulses, skin color and temperature  - Assess for  signs of decreased coronary artery perfusion - ex. Angina  - Evaluate fluid balance, assess for edema, trend weights  Outcome: Progressing  Goal: Absence of cardiac arrhythmias or at baseline  Description: INTERVENTIONS:  - Continuous cardiac monitoring, monitor vital signs, obtain 12 lead EKG if indicated  - Evaluate effectiveness of antiarrhythmic and heart rate control medications as ordered  - Initiate emergency measures for life threatening arrhythmias  - Monitor electrolytes and administer replacement therapy as ordered  Outcome: Progressing

## 2024-02-08 NOTE — PROGRESS NOTES
NURSING DISCHARGE NOTE    Discharged Home via Wheelchair.  Accompanied by Support staff  Belongings Taken by patient/family.  Discharge instructions discussed with pt.  Instructed pt to schedule paracenthesis appt as outpatient.  Discharge medications discussed with pt, including checking her blood pressure due to lasix parameters.  Verbalizes understanding.  Printed AVS given.

## 2024-02-09 ENCOUNTER — PATIENT OUTREACH (OUTPATIENT)
Dept: CASE MANAGEMENT | Age: 44
End: 2024-02-09

## 2024-02-18 ENCOUNTER — APPOINTMENT (OUTPATIENT)
Dept: CT IMAGING | Facility: HOSPITAL | Age: 44
End: 2024-02-18
Attending: EMERGENCY MEDICINE
Payer: MEDICAID

## 2024-02-18 ENCOUNTER — HOSPITAL ENCOUNTER (INPATIENT)
Facility: HOSPITAL | Age: 44
End: 2024-02-18
Attending: EMERGENCY MEDICINE | Admitting: HOSPITALIST
Payer: MEDICAID

## 2024-02-18 ENCOUNTER — HOSPITAL ENCOUNTER (INPATIENT)
Facility: HOSPITAL | Age: 44
LOS: 4 days | Discharge: HOME | End: 2024-02-22
Attending: EMERGENCY MEDICINE | Admitting: HOSPITALIST
Payer: MEDICAID

## 2024-02-18 DIAGNOSIS — E87.5 HYPERKALEMIA: ICD-10-CM

## 2024-02-18 DIAGNOSIS — R18.8 OTHER ASCITES: ICD-10-CM

## 2024-02-18 DIAGNOSIS — D72.829 LEUKOCYTOSIS, UNSPECIFIED TYPE: ICD-10-CM

## 2024-02-18 DIAGNOSIS — F10.10 ALCOHOL ABUSE: ICD-10-CM

## 2024-02-18 DIAGNOSIS — R10.9 ABDOMINAL PAIN OF UNKNOWN ETIOLOGY: Primary | ICD-10-CM

## 2024-02-18 DIAGNOSIS — K70.31 ASCITES DUE TO ALCOHOLIC CIRRHOSIS (HCC): ICD-10-CM

## 2024-02-18 PROBLEM — D64.9 ANEMIA: Status: ACTIVE | Noted: 2024-02-18

## 2024-02-18 PROBLEM — K65.2 SBP (SPONTANEOUS BACTERIAL PERITONITIS) (HCC): Status: ACTIVE | Noted: 2024-02-18

## 2024-02-18 PROBLEM — R73.9 HYPERGLYCEMIA: Status: ACTIVE | Noted: 2024-02-18

## 2024-02-18 LAB
ALBUMIN SERPL-MCNC: 1.9 G/DL (ref 3.4–5)
ALBUMIN/GLOB SERPL: 0.5 {RATIO} (ref 1–2)
ALP LIVER SERPL-CCNC: 246 U/L
ALT SERPL-CCNC: 39 U/L
ANION GAP SERPL CALC-SCNC: 1 MMOL/L (ref 0–18)
AST SERPL-CCNC: 146 U/L (ref 15–37)
B-HCG UR QL: NEGATIVE
BASOPHILS # BLD AUTO: 0.03 X10(3) UL (ref 0–0.2)
BASOPHILS NFR BLD AUTO: 0.2 %
BILIRUB SERPL-MCNC: 2.7 MG/DL (ref 0.1–2)
BILIRUB UR QL STRIP.AUTO: NEGATIVE
BUN BLD-MCNC: 7 MG/DL (ref 9–23)
CALCIUM BLD-MCNC: 8.7 MG/DL (ref 8.5–10.1)
CHLORIDE SERPL-SCNC: 112 MMOL/L (ref 98–112)
CLARITY UR REFRACT.AUTO: CLEAR
CO2 SERPL-SCNC: 26 MMOL/L (ref 21–32)
CREAT BLD-MCNC: 0.59 MG/DL
EGFRCR SERPLBLD CKD-EPI 2021: 114 ML/MIN/1.73M2 (ref 60–?)
EOSINOPHIL # BLD AUTO: 0.03 X10(3) UL (ref 0–0.7)
EOSINOPHIL NFR BLD AUTO: 0.2 %
ERYTHROCYTE [DISTWIDTH] IN BLOOD BY AUTOMATED COUNT: 13 %
FLUAV + FLUBV RNA SPEC NAA+PROBE: NEGATIVE
FLUAV + FLUBV RNA SPEC NAA+PROBE: NEGATIVE
GLOBULIN PLAS-MCNC: 3.9 G/DL (ref 2.8–4.4)
GLUCOSE BLD-MCNC: 104 MG/DL (ref 70–99)
GLUCOSE BLD-MCNC: 99 MG/DL (ref 70–99)
GLUCOSE UR STRIP.AUTO-MCNC: NORMAL MG/DL
HCT VFR BLD AUTO: 34.4 %
HGB BLD-MCNC: 10.7 G/DL
IMM GRANULOCYTES # BLD AUTO: 0.06 X10(3) UL (ref 0–1)
IMM GRANULOCYTES NFR BLD: 0.4 %
INR BLD: 1.23 (ref 0.8–1.2)
KETONES UR STRIP.AUTO-MCNC: NEGATIVE MG/DL
LACTATE SERPL-SCNC: 1.9 MMOL/L (ref 0.4–2)
LEUKOCYTE ESTERASE UR QL STRIP.AUTO: 75
LIPASE SERPL-CCNC: 9 U/L (ref 13–75)
LYMPHOCYTES # BLD AUTO: 2.66 X10(3) UL (ref 1–4)
LYMPHOCYTES NFR BLD AUTO: 16 %
MCH RBC QN AUTO: 34.3 PG (ref 26–34)
MCHC RBC AUTO-ENTMCNC: 31.1 G/DL (ref 31–37)
MCV RBC AUTO: 110.3 FL
MONOCYTES # BLD AUTO: 1.24 X10(3) UL (ref 0.1–1)
MONOCYTES NFR BLD AUTO: 7.5 %
NEUTROPHILS # BLD AUTO: 12.6 X10 (3) UL (ref 1.5–7.7)
NEUTROPHILS # BLD AUTO: 12.6 X10(3) UL (ref 1.5–7.7)
NEUTROPHILS NFR BLD AUTO: 75.7 %
NITRITE UR QL STRIP.AUTO: NEGATIVE
OSMOLALITY SERPL CALC.SUM OF ELEC: 286 MOSM/KG (ref 275–295)
PH UR STRIP.AUTO: 5.5 [PH] (ref 5–8)
PLATELET # BLD AUTO: 271 10(3)UL (ref 150–450)
POTASSIUM SERPL-SCNC: 5.6 MMOL/L (ref 3.5–5.1)
PROCALCITONIN SERPL-MCNC: 0.51 NG/ML (ref ?–0.16)
PROT SERPL-MCNC: 5.8 G/DL (ref 6.4–8.2)
PROT UR STRIP.AUTO-MCNC: 20 MG/DL
PROTHROMBIN TIME: 15.6 SECONDS (ref 11.6–14.8)
RBC # BLD AUTO: 3.12 X10(6)UL
RBC UR QL AUTO: NEGATIVE
RSV RNA SPEC NAA+PROBE: NEGATIVE
SARS-COV-2 RNA RESP QL NAA+PROBE: NOT DETECTED
SODIUM SERPL-SCNC: 139 MMOL/L (ref 136–145)
SP GR UR STRIP.AUTO: 1.03 (ref 1–1.03)
TROPONIN I SERPL HS-MCNC: <3 NG/L
UROBILINOGEN UR STRIP.AUTO-MCNC: 2 MG/DL
WBC # BLD AUTO: 16.6 X10(3) UL (ref 4–11)

## 2024-02-18 PROCEDURE — 71275 CT ANGIOGRAPHY CHEST: CPT | Performed by: EMERGENCY MEDICINE

## 2024-02-18 PROCEDURE — 74177 CT ABD & PELVIS W/CONTRAST: CPT | Performed by: EMERGENCY MEDICINE

## 2024-02-18 PROCEDURE — BW40ZZZ ULTRASONOGRAPHY OF ABDOMEN: ICD-10-PCS | Performed by: EMERGENCY MEDICINE

## 2024-02-18 PROCEDURE — 0W9G3ZZ DRAINAGE OF PERITONEAL CAVITY, PERCUTANEOUS APPROACH: ICD-10-PCS | Performed by: EMERGENCY MEDICINE

## 2024-02-18 PROCEDURE — 99223 1ST HOSP IP/OBS HIGH 75: CPT | Performed by: HOSPITALIST

## 2024-02-18 PROCEDURE — BW40ZZZ ULTRASONOGRAPHY OF ABDOMEN: ICD-10-PCS | Performed by: STUDENT IN AN ORGANIZED HEALTH CARE EDUCATION/TRAINING PROGRAM

## 2024-02-18 PROCEDURE — 0W9G3ZZ DRAINAGE OF PERITONEAL CAVITY, PERCUTANEOUS APPROACH: ICD-10-PCS | Performed by: STUDENT IN AN ORGANIZED HEALTH CARE EDUCATION/TRAINING PROGRAM

## 2024-02-18 RX ORDER — BENZONATATE 100 MG/1
200 CAPSULE ORAL 3 TIMES DAILY PRN
Status: DISCONTINUED | OUTPATIENT
Start: 2024-02-18 | End: 2024-02-22

## 2024-02-18 RX ORDER — MORPHINE SULFATE 4 MG/ML
4 INJECTION, SOLUTION INTRAMUSCULAR; INTRAVENOUS ONCE
Status: COMPLETED | OUTPATIENT
Start: 2024-02-18 | End: 2024-02-18

## 2024-02-18 RX ORDER — HYDROMORPHONE HYDROCHLORIDE 1 MG/ML
0.2 INJECTION, SOLUTION INTRAMUSCULAR; INTRAVENOUS; SUBCUTANEOUS EVERY 2 HOUR PRN
Status: DISCONTINUED | OUTPATIENT
Start: 2024-02-18 | End: 2024-02-18

## 2024-02-18 RX ORDER — MORPHINE SULFATE 2 MG/ML
1 INJECTION, SOLUTION INTRAMUSCULAR; INTRAVENOUS EVERY 2 HOUR PRN
Status: DISCONTINUED | OUTPATIENT
Start: 2024-02-18 | End: 2024-02-21

## 2024-02-18 RX ORDER — MELATONIN
3 NIGHTLY PRN
Status: DISCONTINUED | OUTPATIENT
Start: 2024-02-18 | End: 2024-02-22

## 2024-02-18 RX ORDER — ONDANSETRON 2 MG/ML
8 INJECTION INTRAMUSCULAR; INTRAVENOUS EVERY 6 HOURS PRN
Status: DISCONTINUED | OUTPATIENT
Start: 2024-02-18 | End: 2024-02-22

## 2024-02-18 RX ORDER — ONDANSETRON 2 MG/ML
4 INJECTION INTRAMUSCULAR; INTRAVENOUS ONCE
Status: COMPLETED | OUTPATIENT
Start: 2024-02-18 | End: 2024-02-18

## 2024-02-18 RX ORDER — DEXTROSE MONOHYDRATE 25 G/50ML
50 INJECTION, SOLUTION INTRAVENOUS ONCE
Status: COMPLETED | OUTPATIENT
Start: 2024-02-18 | End: 2024-02-18

## 2024-02-18 RX ORDER — SODIUM CHLORIDE 9 MG/ML
125 INJECTION, SOLUTION INTRAVENOUS CONTINUOUS
Status: DISCONTINUED | OUTPATIENT
Start: 2024-02-18 | End: 2024-02-21

## 2024-02-18 RX ORDER — SENNOSIDES 8.6 MG
17.2 TABLET ORAL NIGHTLY PRN
Status: DISCONTINUED | OUTPATIENT
Start: 2024-02-18 | End: 2024-02-22

## 2024-02-18 RX ORDER — MORPHINE SULFATE 4 MG/ML
4 INJECTION, SOLUTION INTRAMUSCULAR; INTRAVENOUS EVERY 2 HOUR PRN
Status: DISCONTINUED | OUTPATIENT
Start: 2024-02-18 | End: 2024-02-21

## 2024-02-18 RX ORDER — HYDROMORPHONE HYDROCHLORIDE 1 MG/ML
0.4 INJECTION, SOLUTION INTRAMUSCULAR; INTRAVENOUS; SUBCUTANEOUS EVERY 2 HOUR PRN
Status: DISCONTINUED | OUTPATIENT
Start: 2024-02-18 | End: 2024-02-18

## 2024-02-18 RX ORDER — SODIUM CHLORIDE 9 MG/ML
INJECTION, SOLUTION INTRAVENOUS CONTINUOUS
Status: DISCONTINUED | OUTPATIENT
Start: 2024-02-19 | End: 2024-02-21

## 2024-02-18 RX ORDER — CALCIUM GLUCONATE 10 MG/ML
1 INJECTION, SOLUTION INTRAVENOUS ONCE
Status: COMPLETED | OUTPATIENT
Start: 2024-02-18 | End: 2024-02-18

## 2024-02-18 RX ORDER — IBUPROFEN 400 MG/1
400 TABLET ORAL EVERY 6 HOURS PRN
COMMUNITY
End: 2024-02-22

## 2024-02-18 RX ORDER — BISACODYL 10 MG
10 SUPPOSITORY, RECTAL RECTAL
Status: DISCONTINUED | OUTPATIENT
Start: 2024-02-18 | End: 2024-02-22

## 2024-02-18 RX ORDER — ACETAMINOPHEN 325 MG/1
325 TABLET ORAL EVERY 6 HOURS PRN
Status: DISCONTINUED | OUTPATIENT
Start: 2024-02-18 | End: 2024-02-22

## 2024-02-18 RX ORDER — POLYETHYLENE GLYCOL 3350 17 G/17G
17 POWDER, FOR SOLUTION ORAL DAILY PRN
Status: DISCONTINUED | OUTPATIENT
Start: 2024-02-18 | End: 2024-02-22

## 2024-02-18 RX ORDER — MORPHINE SULFATE 2 MG/ML
2 INJECTION, SOLUTION INTRAMUSCULAR; INTRAVENOUS EVERY 2 HOUR PRN
Status: DISCONTINUED | OUTPATIENT
Start: 2024-02-18 | End: 2024-02-21

## 2024-02-18 RX ORDER — ONDANSETRON 2 MG/ML
4 INJECTION INTRAMUSCULAR; INTRAVENOUS EVERY 4 HOURS PRN
Status: ACTIVE | OUTPATIENT
Start: 2024-02-18 | End: 2024-02-19

## 2024-02-18 RX ORDER — ECHINACEA PURPUREA EXTRACT 125 MG
1 TABLET ORAL
Status: DISCONTINUED | OUTPATIENT
Start: 2024-02-18 | End: 2024-02-22

## 2024-02-18 RX ORDER — ACETAMINOPHEN 325 MG/1
325 TABLET ORAL EVERY 6 HOURS PRN
COMMUNITY

## 2024-02-18 RX ORDER — HYDROCODONE BITARTRATE AND ACETAMINOPHEN 5; 325 MG/1; MG/1
1 TABLET ORAL EVERY 6 HOURS PRN
Status: DISCONTINUED | OUTPATIENT
Start: 2024-02-18 | End: 2024-02-20

## 2024-02-18 RX ORDER — HYDROMORPHONE HYDROCHLORIDE 1 MG/ML
0.8 INJECTION, SOLUTION INTRAMUSCULAR; INTRAVENOUS; SUBCUTANEOUS EVERY 2 HOUR PRN
Status: DISCONTINUED | OUTPATIENT
Start: 2024-02-18 | End: 2024-02-18

## 2024-02-18 NOTE — ED INITIAL ASSESSMENT (HPI)
Has ascites, states that she is feeling SOB, abd pain. States that she last had a paracentesis a couple weeks ago.

## 2024-02-18 NOTE — ED PROVIDER NOTES
Patient Seen in: [facility] Emergency Department      History     Chief Complaint   Patient presents with    Abdomen/Flank Pain     Stated Complaint: abdominal, back pain    Subjective:   HPI    This is a 43-year-old female who has a previous history of alcohol abuse.  Patient arrives here with complaints of increasing abdominal pain, distention, shortness of breath.  She states also she has had fevers to 101 yesterday.  She states that she feels weak tired and yesterday felt like she was can almost pass out.  The patient has had no chest pain but did complain of shortness of breath.  The patient's noticed that her abdomen is more distended.  She had been hospitalized for ascites and .  Patient's therapies appendectomy, ,.      Objective:   Past Medical History:   Diagnosis Date    Fibroid, uterine     7cm , up to 12 cm at end of pregnacy              Past Surgical History:   Procedure Laterality Date    APPENDECTOMY            [facility], fibroids    COLONOSCOPY N/A 2023    Procedure: COLONOSCOPY;  Surgeon: Israel Wesley DO;  Location:  ENDOSCOPY                Social History     Socioeconomic History    Marital status:    Tobacco Use    Smoking status: Former     Types: Cigarettes    Smokeless tobacco: Never   Vaping Use    Vaping Use: Never used   Substance and Sexual Activity    Alcohol use: No     Alcohol/week: 0.0 standard drinks of alcohol    Drug use: No     Social Determinants of Health     Food Insecurity: No Food Insecurity (2024)    Food Insecurity     Food Insecurity: Never true   Transportation Needs: No Transportation Needs (2024)    Transportation Needs     Lack of Transportation: No   Housing Stability: Low Risk  (2024)    Housing Stability     Housing Instability: No              Review of Systems    Positive for stated complaint: abdominal, back pain  Other systems are as noted in HPI.  Constitutional and vital  signs reviewed.      All other systems reviewed and negative except as noted above.    Physical Exam     ED Triage Vitals [02/18/24 1434]   /70   Pulse 79   Resp 24   Temp 98.4 °F (36.9 °C)   Temp src Temporal   SpO2 98 %   O2 Device None (Room air)       Current:/72   Pulse 102   Temp 98.4 °F (36.9 °C) (Temporal)   Resp 17   Ht 154.9 cm (5' 1\")   Wt 53.1 kg   LMP 01/01/2024 (Approximate)   SpO2 97%   BMI 22.11 kg/m²         Physical Exam    Coarse breath sounds at the bases.  General: .  Patient is in some mild respiratory distress.  The patient is in no respiratory distress. The patient is not septic or toxic    HEENT: Atraumatic, conjunctiva are not pale.  There is no icterus.  Oral mucosa Is wet.  No facial trauma.  The neck is supple.    LUNGS: Clear to auscultation, there is no wheezing or retraction.  Distended with ascites.  Diffusely tender mostly in the left upper quadrant.    CV: Cardiovascular is regular without murmurs or rubs.    ABD: The abdomen is soft diffusely tender with ascites.  There is left upper quadrant,, mild diffuse tenderness throughout the rest of the abdomen.  There is no rebound.  There is no guarding.    EXT: There is good pulses bilaterally.  There is no calf tenderness.  There is no rash noted.  There is 1+ edema noted bilaterally  NEURO: Alert and oriented x3.  Muscle strength and sensory exam is grossly normal.  And the patient is neurologically intact with no focal findings.    ED Course     Labs Reviewed   COMP METABOLIC PANEL (14) - Abnormal; Notable for the following components:       Result Value    Glucose 104 (*)     Potassium 5.6 (*)     BUN 7 (*)      (*)     Alkaline Phosphatase 246 (*)     Bilirubin, Total 2.7 (*)     Total Protein 5.8 (*)     Albumin 1.9 (*)     A/G Ratio 0.5 (*)     All other components within normal limits   LIPASE - Abnormal; Notable for the following components:    Lipase 9 (*)     All other components within normal  limits   URINALYSIS WITH CULTURE REFLEX - Abnormal; Notable for the following components:    Protein Urine 20 (*)     Urobilinogen Urine 2 (*)     Leukocyte Esterase Urine 75 (*)     Squamous Epi. Cells Few (*)     All other components within normal limits   PROTHROMBIN TIME (PT) - Abnormal; Notable for the following components:    PT 15.6 (*)     INR 1.23 (*)     All other components within normal limits   CBC W/ DIFFERENTIAL - Abnormal; Notable for the following components:    WBC 16.6 (*)     RBC 3.12 (*)     HGB 10.7 (*)     HCT 34.4 (*)     .3 (*)     MCH 34.3 (*)     Neutrophil Absolute Prelim 12.60 (*)     Neutrophil Absolute 12.60 (*)     Monocyte Absolute 1.24 (*)     All other components within normal limits   LACTIC ACID, PLASMA - Normal   TROPONIN I HIGH SENSITIVITY - Normal   POCT PREGNANCY URINE - Normal   POCT GLUCOSE - Normal   SARS-COV-2/FLU A AND B/RSV BY PCR (GENEXPERT) - Normal    Narrative:     This test is intended for the qualitative detection and differentiation of SARS-CoV-2, influenza A, influenza B, and respiratory syncytial virus (RSV) viral RNA in nasopharyngeal or nares swabs from individuals suspected of respiratory viral infection consistent with COVID-19 by their healthcare provider. Signs and symptoms of respiratory viral infection due to SARS-CoV-2, influenza, and RSV can be similar.    Test performed using the Xpert Xpress SARS-CoV-2/FLU/RSV (real time RT-PCR)  assay on the GeneXpert instrument, [employer], [place] [address].   This test is being used under the Food and Drug Administration's Emergency Use Authorization.    The authorized Fact Sheet for Healthcare Providers for this assay is available upon request from the laboratory.   CBC WITH DIFFERENTIAL WITH PLATELET    Narrative:     The following orders were created for panel order CBC With Differential With Platelet.  Procedure                               Abnormality         Status                     ---------                                -----------         ------                     CBC W/ DIFFERENTIAL[[id number]]          Abnormal            Final result                 Please view results for these tests on the individual orders.   SCAN SLIDE   CELL COUNT PERITONEAL FLUID   PROCALCITONIN   RAINBOW DRAW LAVENDER   RAINBOW DRAW LIGHT GREEN   RAINBOW DRAW BLUE   URINE CULTURE, ROUTINE   BLOOD CULTURE   BLOOD CULTURE   BODY FLUID CULT,AEROBIC AND ANAEROBIC     EKG                   The patient was placed on monitors, IV was started, blood was drawn.     Workup was done to rule out an acute electrolyte imbalance, pneumonia, pneumothorax, congestive heart failure, fluid overload, ascites, infection, sepsis.    MDM      The EKG shows normal sinus rhythm.  There is no acute ST elevations or ischemic findings.  The rest of the EKG including rate rhythm axis and intervals I agree with the EKG report . The rate is 64  Patient had a echocardiogram which showed ejection fraction of 65 to 70%.  Admission disposition: 2/18/2024  7:15 PM           The patient's lactic acid was 1.9 and it was within normal limits but borderline.  Influenza was negative, troponin was negative.  The patient's white count was 16.6.  INR was 1.23 lipase was not elevated comprehensive shows elevated potassium.  Blood cultures were ordered.    Urine was also ordered.    But the urinalysis did not show any significant findings of white cells in the urine although there was some leukocytes.        I personally reviewed the radiographs and my individual interpretation shows    No obvious pulmonary embolism, large amount of ascites.    Also reviewed official report and it shows       CTA CHEST + CT ABD (W) + CT PEL (W) (CPT=71275/46080)    Result Date: 2/18/2024  PROCEDURE:  CTA CHEST + CT ABD (W) + CT PEL (W) (CPT=71275/49951)  COMPARISON:  [facility] , CT, CT ABDOMEN PELVIS IV CONTRAST, NO ORAL (ER), 2/01/2024, 9:46 PM.  INDICATIONS:  abdominal, back pain   TECHNIQUE:  CT of the chest (with CTA imaging), abdomen, and pelvis were obtained post- injection of non-ionic intravenous contrast material. Multi-planar reformatted/3-D images were created to optimize visualization of vascular anatomy.  Dose reduction techniques were used. Dose information is transmitted to the ACR (American College of Radiology) NRDR (National Radiology Data Registry) which includes the Dose Index Registry.  PATIENT STATED HISTORY:(As transcribed by Technologist)  Patient complains of shortness of breath, with pain across her back and abdominal distention all four quadrants.   CONTRAST USED:  100cc of Isovue 370  FINDINGS:   CHEST:  LUNGS:  There is bibasilar atelectasis and dependent atelectasis within the right lower lobe.  Superimposed infection/inflammation is not excluded.  No focal consolidation otherwise.  The central airways are patent. MEDIASTINUM:  No mass or adenopathy.  OLIVIER:  No mass or adenopathy.  CARDIAC:  No enlargement, pericardial thickening, or significant coronary artery calcification. PLEURA:  No mass or effusion.  CHEST WALL:  No mass or axillary adenopathy.  AORTA:  No aneurysm or dissection.  VASCULATURE:  No visible pulmonary arterial thrombus or attenuation.   ABDOMEN/PELVIS: LIVER:  Stable enlargement of the liver.  No focal lesion. BILIARY:  No visible dilatation or calcification.  PANCREAS:  No lesion, fluid collection, ductal dilatation, or atrophy.  SPLEEN:  No enlargement or focal lesion.  KIDNEYS:  No mass, obstruction, or calcification.  ADRENALS:  No mass or enlargement.  AORTA:  No aneurysm or dissection.  RETROPERITONEUM:  No mass or adenopathy.  BOWEL/MESENTERY:  No dilated bowel.  There is hyperenhancement of the small bowel in the left upper abdomen.  There is moderate amount of low attenuation ascites, increased from prior. ABDOMINAL WALL:  No mass or hernia.  URINARY BLADDER:  No visible focal wall thickening, lesion, or calculus.  PELVIC NODES:  No  adenopathy.  PELVIC ORGANS:  Stable right pelvic calcification.  Uterus and bilateral adnexa within normal limits otherwise. BONES:  No bony lesion or fracture.            CONCLUSION:  1. Increasing abdominal ascites in comparison to 2/1/2024. 2. Hyperenhancing small bowel, correlate for findings of enteritis. 3. Stable enlarged liver. 4. Bibasilar atelectasis within the lungs.  Superimposed infection/inflammation is not excluded. 5. Additional findings as detailed in the body of the report.   LOCATION:  [facility]   Dictated by (CST): Vitaliy Luke MD on 2/18/2024 at 6:05 PM     Finalized by (CST): Vitaliy Luke MD on 2/18/2024 at 6:13 PM          Because of the abnormal findings and the fact he was still having some abdominal pain the possibility of spontaneous bacterial peritonitis was considered..  The patient I discussed with her the risk and benefits of doing diagnostic paracentesis.  And she was agreed with the I discussed the risk including infection, bleeding, injury to the bowel.      Under sterile conditions.  After the pocket a large pocket of ascites was seen under ultrasound.  The area was marked.  Infiltrated with lidocaine with epinephrine.  And using an 18-gauge needle.  I was able to obtain about 30 cc of fluid there was no complications no active bleeding I did visualize the vessels it was lateral to the vessels.  There is no area of arterial vasculature where I did the drainage.  The patient felt comfortable with this.  Was all done under sterile conditions without complications.  Approximate 30 cc of fluid that was yellow slightly cloudy was obtained.  It was sent for analysis.  Discussed this case with Dr. Tc Burrell.  Also talked to the pharmacist and recommended ceftriaxone.  To cover gram-negative's.  The patient will be admitted.  The there is a chest x-ray that showed a questionable some atelectasis will order procalcitonin she has no other specific complaints influenza, COVID was  negative.  Blood cultures obtained.  Her blood pressure has been stable while she has been here.  She states she does not run low blood pressures.    Patient will be admitted for further evaluation and treatment.       Medical Decision Making      Disposition and Plan     Clinical Impression:  1. Abdominal pain of unknown etiology    2. Ascites due to alcoholic cirrhosis (HCC)    3. Leukocytosis, unspecified type    4. Hyperkalemia         Disposition:  Admit  2/18/2024  7:15 pm    Follow-up:  No follow-up provider specified.        Medications Prescribed:  Current Discharge Medication List                            Hospital Problems       Present on Admission  Date Reviewed: 2/2/2024            ICD-10-CM Noted POA    * (Principal) Abdominal pain of unknown etiology R10.9 2/18/2024 Unknown    Anemia D64.9 2/18/2024 Yes    Hyperglycemia R73.9 2/18/2024 Yes    Hyperkalemia E87.5 2/18/2024 Yes    Leukocytosis D72.829 2/18/2024 Yes

## 2024-02-19 ENCOUNTER — APPOINTMENT (OUTPATIENT)
Dept: ULTRASOUND IMAGING | Facility: HOSPITAL | Age: 44
End: 2024-02-19
Attending: HOSPITALIST
Payer: MEDICAID

## 2024-02-19 LAB
ANION GAP SERPL CALC-SCNC: 5 MMOL/L (ref 0–18)
ATRIAL RATE: 64 BPM
BASOPHILS NFR PRT: 0 %
BUN BLD-MCNC: 5 MG/DL (ref 9–23)
CALCIUM BLD-MCNC: 8.1 MG/DL (ref 8.5–10.1)
CHLORIDE SERPL-SCNC: 108 MMOL/L (ref 98–112)
CO2 SERPL-SCNC: 27 MMOL/L (ref 21–32)
COLOR FLD: YELLOW
CREAT BLD-MCNC: 0.47 MG/DL
EGFRCR SERPLBLD CKD-EPI 2021: 120 ML/MIN/1.73M2 (ref 60–?)
EOSINOPHIL NFR PRT: 0 %
ERYTHROCYTE [DISTWIDTH] IN BLOOD BY AUTOMATED COUNT: 13.2 %
GLUCOSE BLD-MCNC: 92 MG/DL (ref 70–99)
HCT VFR BLD AUTO: 29.3 %
HGB BLD-MCNC: 9.1 G/DL
LYMPHOCYTES NFR PRT: 80 %
MAGNESIUM SERPL-MCNC: 1.8 MG/DL (ref 1.6–2.6)
MCH RBC QN AUTO: 34 PG (ref 26–34)
MCHC RBC AUTO-ENTMCNC: 31.1 G/DL (ref 31–37)
MCV RBC AUTO: 109.3 FL
MESOTHL CELL NFR PRT: 18 %
MONOS+MACROS NFR PRT: 1 %
NEUTROPHILS PERITONEAL FLUID: 1 %
OSMOLALITY SERPL CALC.SUM OF ELEC: 287 MOSM/KG (ref 275–295)
P AXIS: 38 DEGREES
P-R INTERVAL: 130 MS
PHOSPHATE SERPL-MCNC: 2.5 MG/DL (ref 2.5–4.9)
PLATELET # BLD AUTO: 221 10(3)UL (ref 150–450)
POTASSIUM SERPL-SCNC: 4.1 MMOL/L (ref 3.5–5.1)
Q-T INTERVAL: 434 MS
QRS DURATION: 66 MS
QTC CALCULATION (BEZET): 447 MS
R AXIS: 63 DEGREES
RBC # BLD AUTO: 2.68 X10(6)UL
RBC PERITONEAL FLUID: <3000 /MM3
SODIUM SERPL-SCNC: 140 MMOL/L (ref 136–145)
T AXIS: -1 DEGREES
TOTAL CELLS COUNTED FLD: 100
TURBIDITY CSF QL: CLEAR
VENTRICULAR RATE: 64 BPM
WBC # BLD AUTO: 13.8 X10(3) UL (ref 4–11)
WBC # PRT: 53 /MM3

## 2024-02-19 PROCEDURE — 49083 ABD PARACENTESIS W/IMAGING: CPT | Performed by: HOSPITALIST

## 2024-02-19 PROCEDURE — 0W9G3ZZ DRAINAGE OF PERITONEAL CAVITY, PERCUTANEOUS APPROACH: ICD-10-PCS | Performed by: RADIOLOGY

## 2024-02-19 PROCEDURE — BW40ZZZ ULTRASONOGRAPHY OF ABDOMEN: ICD-10-PCS | Performed by: RADIOLOGY

## 2024-02-19 PROCEDURE — 30233J1 TRANSFUSION OF NONAUTOLOGOUS SERUM ALBUMIN INTO PERIPHERAL VEIN, PERCUTANEOUS APPROACH: ICD-10-PCS | Performed by: INTERNAL MEDICINE

## 2024-02-19 PROCEDURE — 99232 SBSQ HOSP IP/OBS MODERATE 35: CPT | Performed by: HOSPITALIST

## 2024-02-19 RX ORDER — MELATONIN
100 2 TIMES DAILY
Status: DISCONTINUED | OUTPATIENT
Start: 2024-02-19 | End: 2024-02-22

## 2024-02-19 RX ORDER — MAGNESIUM OXIDE 400 MG/1
400 TABLET ORAL ONCE
Status: COMPLETED | OUTPATIENT
Start: 2024-02-19 | End: 2024-02-19

## 2024-02-19 RX ORDER — FOLIC ACID 1 MG/1
1 TABLET ORAL DAILY
Status: DISCONTINUED | OUTPATIENT
Start: 2024-02-19 | End: 2024-02-22

## 2024-02-19 RX ORDER — PANTOPRAZOLE SODIUM 40 MG/1
40 TABLET, DELAYED RELEASE ORAL
Status: DISCONTINUED | OUTPATIENT
Start: 2024-02-19 | End: 2024-02-22

## 2024-02-19 RX ORDER — MIDODRINE HYDROCHLORIDE 10 MG/1
10 TABLET ORAL 3 TIMES DAILY
Status: DISCONTINUED | OUTPATIENT
Start: 2024-02-19 | End: 2024-02-22

## 2024-02-19 RX ORDER — ALBUMIN (HUMAN) 12.5 G/50ML
25 SOLUTION INTRAVENOUS ONCE
Status: COMPLETED | OUTPATIENT
Start: 2024-02-19 | End: 2024-02-19

## 2024-02-19 NOTE — ED QUICK NOTES
Orders for admission, patient is aware of plan and ready to go upstairs. Any questions, please call ED RN Kylie at extension [phone]  Patient Covid vaccination status: Unvaccinated     COVID Test Ordered in ED: SARS-CoV-2/Flu A and B/RSV by PCR (GeneXpert)    COVID Suspicion at Admission: N/A    Running Infusions:       Mental Status/LOC at time of transport: A&Ox4    Other pertinent information:   CIWA score: N/A   NIH score:  N/A

## 2024-02-19 NOTE — PLAN OF CARE
Patient is alert and oriented. On room air. Replacing mg and sodium phosphate per protocol. Tele with NSR. Abdomen is distended/ firm. Patient states last BM yesterday. Voiding freely. Receiving iVF. Gauze and tegaderm located LLQ from yesterday's para. Receiving rocephin Q24 hrs. Up with SBA. Patient hypotensive with B/P at 82/50 after paracentesis today. 2.9 L removed. Midodrine given. Received orders from Dr. Pagan for albumin administration. Albumin given B/P now at 90/59. Patient able to eat a low sodium diet per Dr. Wesley. POC updated with patient. Patient verbalized understanding.

## 2024-02-19 NOTE — PROGRESS NOTES
[facility]  Progress Note      Tamy Wall Patient Status:  Inpatient    1980 MRN [id number]   Location [facility] 3NW-A Attending Cuong Pagan MD   Hosp Day # 1 PCP Karoline Burks DO     INDICATIONS: Ascites    DESCRIPTION OF PROCEDURE: Informed consent was obtained.  The patient was prepped and draped in the standard sterile fashion.  An ultrasound-guided paracentesis was performed in the usual sterile manner.    PATIENT STATED HISTORY: (As transcribed by Technologist)        LOCATION: Right lower quadrant  FLUID REMOVED: 2.9 L  MEDICATION: 9 cc of 1% Lidocaine for local anesthetic.  COMPLICATIONS: None.       CONCLUSION: Ultrasound-guided paracentesis was performed without complication.  Vital Signs:  Blood pressure 93/62, pulse 86, temperature 98.1 °F (36.7 °C), temperature source Oral, resp. rate 18, height 61\", weight 119 lb 3.2 oz, last menstrual period 2024, SpO2 97%, not currently breastfeeding.    Input/Output:    Intake/Output Summary (Last 24 hours) at 2024 1213  Last data filed at 2024 0510  Gross per 24 hour   Intake 340 ml   Output 375 ml   Net -35 ml     Results:   Labs:  Lab Results   Component Value Date    WBC 13.8 2024    RBC 2.68 2024    HGB 9.1 2024    HCT 29.3 2024    .3 2024    MCH 34.0 2024    MCHC 31.1 2024    RDW 13.2 2024    .0 2024       Dann Chavez MD  2024  12:13 PM

## 2024-02-19 NOTE — PLAN OF CARE
Patient alert and oriented x4. VSS, on room air. Morphine given for pain control. NPO per order. Plan of care discussed with the patient. Bed alarm activated. Call light within reach.     Problem: RESPIRATORY - ADULT  Goal: Achieves optimal ventilation and oxygenation  Description: INTERVENTIONS:  - Assess for changes in respiratory status  - Assess for changes in mentation and behavior  - Position to facilitate oxygenation and minimize respiratory effort  - Oxygen supplementation based on oxygen saturation or ABGs  - Provide Smoking Cessation handout, if applicable  - Encourage broncho-pulmonary hygiene including cough, deep breathe, Incentive Spirometry  - Assess the need for suctioning and perform as needed  - Assess and instruct to report SOB or any respiratory difficulty  - Respiratory Therapy support as indicated  - Manage/alleviate anxiety  - Monitor for signs/symptoms of CO2 retention  Outcome: Progressing     Problem: PAIN - ADULT  Goal: Verbalizes/displays adequate comfort level or patient's stated pain goal  Description: INTERVENTIONS:  - Encourage pt to monitor pain and request assistance  - Assess pain using appropriate pain scale  - Administer analgesics based on type and severity of pain and evaluate response  - Implement non-pharmacological measures as appropriate and evaluate response  - Consider cultural and social influences on pain and pain management  - Manage/alleviate anxiety  - Utilize distraction and/or relaxation techniques  - Monitor for opioid side effects  - Notify MD/LIP if interventions unsuccessful or patient reports new pain  - Anticipate increased pain with activity and pre-medicate as appropriate  Outcome: Progressing     Problem: RISK FOR INFECTION - ADULT  Goal: Absence of fever/infection during anticipated neutropenic period  Description: INTERVENTIONS  - Monitor WBC  - Administer growth factors as ordered  - Implement neutropenic guidelines  Outcome: Progressing     Problem:  SAFETY ADULT - FALL  Goal: Free from fall injury  Description: INTERVENTIONS:  - Assess pt frequently for physical needs  - Identify cognitive and physical deficits and behaviors that affect risk of falls.  - [place] fall precautions as indicated by assessment.  - Educate pt/family on patient safety including physical limitations  - Instruct pt to call for assistance with activity based on assessment  - Modify environment to reduce risk of injury  - Provide assistive devices as appropriate  - Consider OT/PT consult to assist with strengthening/mobility  - Encourage toileting schedule  Outcome: Progressing     Problem: DISCHARGE PLANNING  Goal: Discharge to home or other facility with appropriate resources  Description: INTERVENTIONS:  - Identify barriers to discharge w/pt and caregiver  - Include patient/family/discharge partner in discharge planning  - Arrange for needed discharge resources and transportation as appropriate  - Identify discharge learning needs (meds, wound care, etc)  - Arrange for interpreters to assist at discharge as needed  - Consider post-discharge preferences of patient/family/discharge partner  - Complete POLST form as appropriate  - Assess patient's ability to be responsible for managing their own health  - Refer to Case Management Department for coordinating discharge planning if the patient needs post-hospital services based on physician/LIP order or complex needs related to functional status, cognitive ability or social support system  Outcome: Progressing     Problem: Altered Communication/Language Barrier  Goal: Patient/Family is able to understand and participate in their care  Description: Interventions:  - Assess communication ability and preferred communication style  - Implement communication aides and strategies  - Use visual cues when possible  - Listen attentively, be patient, do not interrupt  - Minimize distractions  - Allow time for understanding and response  - Establish  method for patient to ask for assistance (call light)  - Provide an  as needed  - Communicate barriers and strategies to overcome with those who interact with patient  Outcome: Progressing     Problem: CARDIOVASCULAR - ADULT  Goal: Maintains optimal cardiac output and hemodynamic stability  Description: INTERVENTIONS:  - Monitor vital signs, rhythm, and trends  - Monitor for bleeding, hypotension and signs of decreased cardiac output  - Evaluate effectiveness of vasoactive medications to optimize hemodynamic stability  - Monitor arterial and/or venous puncture sites for bleeding and/or hematoma  - Assess quality of pulses, skin color and temperature  - Assess for signs of decreased coronary artery perfusion - ex. Angina  - Evaluate fluid balance, assess for edema, trend weights  Outcome: Progressing  Goal: Absence of cardiac arrhythmias or at baseline  Description: INTERVENTIONS:  - Continuous cardiac monitoring, monitor vital signs, obtain 12 lead EKG if indicated  - Evaluate effectiveness of antiarrhythmic and heart rate control medications as ordered  - Initiate emergency measures for life threatening arrhythmias  - Monitor electrolytes and administer replacement therapy as ordered  Outcome: Progressing     Problem: GASTROINTESTINAL - ADULT  Goal: Minimal or absence of nausea and vomiting  Description: INTERVENTIONS:  - Maintain adequate hydration with IV or PO as ordered and tolerated  - Nasogastric tube to low intermittent suction as ordered  - Evaluate effectiveness of ordered antiemetic medications  - Provide nonpharmacologic comfort measures as appropriate  - Advance diet as tolerated, if ordered  - Obtain nutritional consult as needed  - Evaluate fluid balance  Outcome: Progressing  Goal: Maintains or returns to baseline bowel function  Description: INTERVENTIONS:  - Assess bowel function  - Maintain adequate hydration with IV or PO as ordered and tolerated  - Evaluate effectiveness of GI  medications  - Encourage mobilization and activity  - Obtain nutritional consult as needed  - Establish a toileting routine/schedule  - Consider collaborating with pharmacy to review patient's medication profile  Outcome: Progressing  Goal: Maintains adequate nutritional intake (undernourished)  Description: INTERVENTIONS:  - Monitor percentage of each meal consumed  - Identify factors contributing to decreased intake, treat as appropriate  - Assist with meals as needed  - Monitor I&O, WT and lab values  - Obtain nutritional consult as needed  - Optimize oral hygiene and moisture  - Encourage food from home; allow for food preferences  - Enhance eating environment  Outcome: Progressing     Problem: METABOLIC/FLUID AND ELECTROLYTES - ADULT  Goal: Electrolytes maintained within normal limits  Description: INTERVENTIONS:  - Monitor labs and rhythm and assess patient for signs and symptoms of electrolyte imbalances  - Administer electrolyte replacement as ordered  - Monitor response to electrolyte replacements, including rhythm and repeat lab results as appropriate  - Fluid restriction as ordered  - Instruct patient on fluid and nutrition restrictions as appropriate  Outcome: Progressing  Goal: Hemodynamic stability and optimal renal function maintained  Description: INTERVENTIONS:  - Monitor labs and assess for signs and symptoms of volume excess or deficit  - Monitor intake, output and patient weight  - Monitor urine specific gravity, serum osmolarity and serum sodium as indicated or ordered  - Monitor response to interventions for patient's volume status, including labs, urine output, blood pressure (other measures as available)  - Encourage oral intake as appropriate  - Instruct patient on fluid and nutrition restrictions as appropriate  Outcome: Progressing

## 2024-02-19 NOTE — CONSULTS
[facility]                       Gastroenterology Consultation-[facility] Gastroenterology    Tamy Wall Patient Status:  Inpatient    1980 MRN [id number]   Location [facility] 3NW-A Attending Cuong Pagan MD   Hosp Day # 1 PCP Karoline Burks DO     Reason for consultation: Cirrhosis, Ascites  HPI: 43 year old female with PMHx of EtOH cirrhosis c/b ascites who was admitted on  with diffuse abdominal pain and distension related to recurrent ascites with associated SOB. CTA Chest + CT abd/pelvis with increasing abdominal ascites in comparison to imaging from  with hyperenhancing small bowel and stable enlarged liver. While in the ED, a bedside diagnostic paracentesis was performed to rule out SBP given diffuse abdominal pain after which pt was started on empiric antibiotics. Ascitic fluid studies have since come back negative for SBP.  Pt is now s/p therapeutic US Paracentesis with 2.9L of fluid removed. She does report improvement of her SOB and abdominal pain. Pt's BP has been consistently borderline hypotensive/low end of normal precluding diuretic therapy. UA/Ucx positive for GNRs. Blood cultures pending. Liver enzymes elevated (Alk Phos 246, , ALT 39, Tbili 2.7). Potassium was 5.6 on initial ED presentation, therefore all potassium-sparing diuretics held. She is s/p hyperkalemia treatment with repeat K+ of 4.1.   Pt was recently admitted from  -  with alcoholic hepatitis with paracentesis with 610 mL drained. Following this admission, pt was advised to establish care with a tertiary center (referral placed to Dr. Perez at ), however she has not yet been able to schedule this. She reports that she has not had any alcohol for 5 months. Denies NSAID use. Reports use of Miralax daily, with soft Bms. Reports concerns regarding her financial situation with possible need for further workup/treatment.   Endoscopic History:   EGD 23: Normal appearing esophagus, mild  gastritis, normal duodenum; Bx: Chronic gastritis with focal acute activity, negative for H. Pylori  Colonoscopy  23: 5 mm cecal polyp (TA), normal-appearing terminal ileum, random colon biopsies negative for microscopic colitis   PMHx:   Past Medical History:   Diagnosis Date    Fibroid, uterine 2014    7cm , up to 12 cm at end of pregnacy     PSHx:   Past Surgical History:   Procedure Laterality Date    APPENDECTOMY            [facility], fibroids    COLONOSCOPY N/A 2023    Procedure: COLONOSCOPY;  Surgeon: Israel Wesley DO;  Location:  ENDOSCOPY     Medications:    folic acid (Folvite) tab 1 mg  1 mg Oral Daily    midodrine (ProAmatine) tab 10 mg  10 mg Oral TID    pantoprazole (Protonix) DR tab 40 mg  40 mg Oral QAM AC    thiamine (Vitamin B1) tab 100 mg  100 mg Oral BID    sodium phosphate 15 mmol in 0.9% NaCl 100mL IVPB premix  15 mmol Intravenous Once    [COMPLETED] magnesium oxide (Mag-Ox) tab 400 mg  400 mg Oral Once    [COMPLETED] albumin human (Albumin) 25% injection 25 g  25 g Intravenous Once    [COMPLETED] calcium gluconate 1g in 100mL iso-NaCl IVPB premix  1 g Intravenous Once    [COMPLETED] dextrose 50% injection 50 mL  50 mL Intravenous Once    [COMPLETED] insulin regular human (Novolin R, Humulin R) 100 UNIT/ML injection 10 Units  10 Units Intravenous Once    sodium chloride 0.9% infusion  125 mL/hr Intravenous Continuous    [COMPLETED] iopamidol 76% (ISOVUE-370) injection for power injector  100 mL Intravenous ONCE PRN    [] ondansetron (Zofran) 4 MG/2ML injection 4 mg  4 mg Intravenous Q4H PRN    [COMPLETED] morphINE PF 4 MG/ML injection 4 mg  4 mg Intravenous Once    [COMPLETED] ondansetron (Zofran) 4 MG/2ML injection 4 mg  4 mg Intravenous Once    [COMPLETED] cefTRIAXone (Rocephin) 1 g in D5W 100 mL IVPB-ADD  1 g Intravenous Once    pantoprazole (Protonix) 40 mg in sodium chloride 0.9% PF 10 mL IV push  40 mg Intravenous Q12H    sodium chloride  0.9% infusion   Intravenous Continuous    acetaminophen (Tylenol) tab 325 mg  325 mg Oral Q6H PRN    melatonin tab 3 mg  3 mg Oral Nightly PRN    polyethylene glycol (PEG 3350) (Miralax) 17 g oral packet 17 g  17 g Oral Daily PRN    sennosides (Senokot) tab 17.2 mg  17.2 mg Oral Nightly PRN    bisacodyl (Dulcolax) 10 MG rectal suppository 10 mg  10 mg Rectal Daily PRN    ondansetron (Zofran) 4 MG/2ML injection 8 mg  8 mg Intravenous Q6H PRN    benzonatate (Tessalon) cap 200 mg  200 mg Oral TID PRN    glycerin-hypromellose- (Artifical Tears) 0.2-0.2-1 % ophthalmic solution 1 drop  1 drop Both Eyes QID PRN    sodium chloride (Saline Mist) 0.65 % nasal solution 1 spray  1 spray Each Nare Q3H PRN    cefTRIAXone (Rocephin) 2 g in D5W 100 mL IVPB-ADD  2 g Intravenous Q24H    HYDROcodone-acetaminophen (Norco) 5-325 MG per tab 1 tablet  1 tablet Oral Q6H PRN    morphINE PF 2 MG/ML injection 1 mg  1 mg Intravenous Q2H PRN    Or    morphINE PF 2 MG/ML injection 2 mg  2 mg Intravenous Q2H PRN    Or    morphINE PF 4 MG/ML injection 4 mg  4 mg Intravenous Q2H PRN     Allergies: No Known Allergies  SocHx:  No history of smoking;  + h/o ETOH abuse (last drink 5 months ago); The patient has no history of IV drug use or other illicit substances.  FamHx: The patient has no family history of colon cancer or other gastrointestinal malignancies;  No family history of ulcer disease, or inflammatory bowel disease  ROS:  In addition to the pertinent positives described above:            Infectious Disease:  + UTI, recent Influenza A             Cardiovascular: No history of CAD, prior MI, chest pain, or palpitations            Respiratory: + SOB related to ascites            Hematologic: The patient reports no easy bruising, frequent gum bleeding or nose bleeding;  The patient has no history of known chronic anemia            Dermatologic: The patient reports no recent rashes or chronic skin disorders            Rheumatologic: The  patient reports no history of chronic arthritis, myalgias, arthralgias            Genitourinary:  The patient reports no history of recurrent urinary tract infections, hematuria, dysuria, or nephrolithiasis           Psychiatric: The patient reports no history of depression, anxiety, suicidal ideation, or homicidal ideation           Oncologic: The patient reports no history of prior solid tumor or hematologic malignancy           ENT: The patient reports no hoarseness of voice, hearing loss, sinus congestion, tinnitus           Neurologic: The patient reports no history of seizure, stroke, or frequent headaches  PE: BP (!) 82/50 (BP Location: Right arm)   Pulse 55   Temp 98.5 °F (36.9 °C) (Oral)   Resp 20   Ht 5' 1\" (1.549 m)   Wt 119 lb 3.2 oz (54.1 kg)   LMP 01/01/2024 (Approximate)   SpO2 98%   BMI 22.52 kg/m²   Gen: AAO x 3, able to speak in complete sentences  HENT: EOMI, PERRL, oropharynx is clear with moist mucosal membranes  Eyes: + Scleral icterus  Neck:  Supple without nuchal rigidity  CV: Regular rate and rhythm, with normal S1 and S2  Resp: Clear to auscultation bilaterally without wheezes; rubs, rhonchi, or rales  Abdomen: Soft, diffusely tender to palpation, non-distended with the presence of bowel sounds; No hepatosplenomegaly; no rebound or guarding; No ascites is clinically apparent  Ext: Mild bilateral lower extremity edema, no cyanosis  Skin: Warm and dry. + Mild jaundice  Psychiatric: Appropriate mood and congruent affect without obvious depression or anxiety  Labs:   Lab Results   Component Value Date    WBC 13.8 02/19/2024    HGB 9.1 02/19/2024    HCT 29.3 02/19/2024    .0 02/19/2024    CREATSERUM 0.47 02/19/2024    BUN 5 02/19/2024     02/19/2024    K 4.1 02/19/2024     02/19/2024    CO2 27.0 02/19/2024    GLU 92 02/19/2024    CA 8.1 02/19/2024    MG 1.8 02/19/2024    PHOS 2.5 02/19/2024     Recent Labs   Lab 02/18/24  1451 02/19/24  0523   * 92   BUN 7* 5*    CREATSERUM 0.59 0.47*   CA 8.7 8.1*    140   K 5.6* 4.1    108   CO2 26.0 27.0     Recent Labs   Lab 02/18/24  1450 02/19/24  0523   RBC 3.12* 2.68*   HGB 10.7* 9.1*   HCT 34.4* 29.3*   .3* 109.3*   MCH 34.3* 34.0   MCHC 31.1 31.1   RDW 13.0 13.2   NEPRELIM 12.60*  --    WBC 16.6* 13.8*   .0 221.0       Recent Labs   Lab 02/18/24  1451   ALT 39   *     Component      Latest Ref [facility] 2/18/2024   Color Urine      Yellow  Dark-Yellow    Clarity Urine      Clear  Clear    Spec Gravity      1.005 - 1.030  1.030    Glucose Urine      Normal mg/dL Normal    Bilirubin Urine      Negative  Negative    Ketones, UA      Negative mg/dL Negative    Blood Urine      Negative  Negative    PH Urine      5.0 - 8.0  5.5    Protein Urine      Negative mg/dL 20 !    Urobilinogen Urine      Normal mg/dL 2 !    Nitrite Urine      Negative  Negative    Leukocyte Esterase       Negative  75 !    WBC Urine      0 - 5 /HPF 1-5    RBC Urine      0 - 2 /HPF 0-2    Bacteria Urine      None Seen /HPF None Seen    SQUAM EPI CELLS UR      None Seen /HPF Few !    RENAL TUBULAR EPITHELIAL CELLS      None Seen /HPF None Seen    TRANSITIONAL EPI CELLS      None Seen /HPF None Seen    YEAST URINE      None Seen /HPF None Seen       Urine Culture, Routine  Order: [id number]  Collected 2/18/2024  2:50 PM       Status: Preliminary result    Specimen Information: Urine, clean catch   0 Result Notes  URINE CULTURE >100,000 CFU/ML Gram Negative Amol Abnormal         Component      Latest Ref [facility] 2/18/2024   PT      11.6 - 14.8 seconds 15.6 (H)    INR      0.80 - 1.20  1.23 (H)       Component      Latest Ref [facility] 2/18/2024   Lipase      13 - 75 U/L 9 (L)         Component      Latest Ref [facility] 2/18/2024   Neutrophils Peritoneal      % 1    LYMPHOCYTE PERITONEAL FLUID      % 80    MONO/MAC/HISTIO PERITONEAL FL      % 1    EOSINOPHILS PERITONEAL FLUID      % 0    BASOPHIL PERITONEAL FLUID      % 0    MESOTHELIAL PERITONEAL FLUID       % 18    TOTAL CELLS COUNTED 100    BODY FLUID COLOR Yellow    BODY FLUID CLARITY Clear    WBC, Peritoneal Fluid      /mm3 53    RBC PERITONEAL FLUID      /mm3 <3,000    Body Fluid Cult Aerobic and Anaerobic  Order: [id number]  Collected 2/18/2024  8:11 PM       Status: Preliminary result    Specimen Information: Abdominal fluid; Body fluid, unspecified   0 Result Notes  Body Fluid Smear No WBCs seen      No organisms seen             Imaging:   Narrative   PROCEDURE:  CTA CHEST + CT ABD (W) + CT PEL (W) SH(CPT=71275/57607)     COMPARISON:  [facility] , CT, CT ABDOMEN PELVIS IV CONTRAST, NO ORAL (ER), 2/01/2024, 9:46 PM.     INDICATIONS:  abdominal, back pain     TECHNIQUE:  CT of the chest (with CTA imaging), abdomen, and pelvis were obtained post- injection of non-ionic intravenous contrast material. Multi-planar reformatted/3-D images were created to optimize visualization of vascular anatomy.  Dose reduction  techniques were used. Dose information is transmitted to the ACR (American College of Radiology) NRDR (National Radiology Data Registry) which includes the Dose Index Registry.     PATIENT STATED HISTORY:(As transcribed by Technologist)  Patient complains of shortness of breath, with pain across her back and abdominal distention all four quadrants.      CONTRAST USED:  100cc of Isovue 370     FINDINGS:       CHEST:    LUNGS:  There is bibasilar atelectasis and dependent atelectasis within the right lower lobe.  Superimposed infection/inflammation is not excluded.  No focal consolidation otherwise.  The central airways are patent.  MEDIASTINUM:  No mass or adenopathy.    OLIVIER:  No mass or adenopathy.    CARDIAC:  No enlargement, pericardial thickening, or significant coronary artery calcification.  PLEURA:  No mass or effusion.    CHEST WALL:  No mass or axillary adenopathy.    AORTA:  No aneurysm or dissection.    VASCULATURE:  No visible pulmonary arterial thrombus or attenuation.       ABDOMEN/PELVIS:  LIVER:   Stable enlargement of the liver.  No focal lesion.  BILIARY:  No visible dilatation or calcification.    PANCREAS:  No lesion, fluid collection, ductal dilatation, or atrophy.    SPLEEN:  No enlargement or focal lesion.    KIDNEYS:  No mass, obstruction, or calcification.    ADRENALS:  No mass or enlargement.    AORTA:  No aneurysm or dissection.    RETROPERITONEUM:  No mass or adenopathy.    BOWEL/MESENTERY:  No dilated bowel.  There is hyperenhancement of the small bowel in the left upper abdomen.  There is moderate amount of low attenuation ascites, increased from prior.  ABDOMINAL WALL:  No mass or hernia.    URINARY BLADDER:  No visible focal wall thickening, lesion, or calculus.    PELVIC NODES:  No adenopathy.    PELVIC ORGANS:  Stable right pelvic calcification.  Uterus and bilateral adnexa within normal limits otherwise.  BONES:  No bony lesion or fracture.                   Impression   CONCLUSION:    1. Increasing abdominal ascites in comparison to 2/1/2024.  2. Hyperenhancing small bowel, correlate for findings of enteritis.  3. Stable enlarged liver.  4. Bibasilar atelectasis within the lungs.  Superimposed infection/inflammation is not excluded.  5. Additional findings as detailed in the body of the report.        LOCATION:  [facility]        Dictated by (CST): Vitaliy Luke MD on 2/18/2024 at 6:05 PM      Finalized by (CST): Vitaliy Luke MD on 2/18/2024 at 6:13 PM     PROCEDURE:  US PARACENTESIS W IMAGING  (CPT=49083)     COMPARISON:  [facility] , CT, CTA CHEST + CT ABD (W) + CT PEL (W) SH(CPT=71275/16648), 2/18/2024, 5:38 PM.  [facility] US, US PARACENTESIS W IMAGING (CPT=49083), 2/07/2024, 10:33 AM.     INDICATIONS:  Ascites     DESCRIPTION OF PROCEDURE:  Informed consent was obtained.  The patient was prepped and draped in the standard sterile fashion.  An ultrasound-guided paracentesis was performed in the usual sterile manner.     PATIENT STATED HISTORY: (As transcribed by Technologist)            LOCATION:  Right lower quadrant  FLUID REMOVED:  2.9 L  MEDICATION:  9 cc of 1% Lidocaine for local anesthetic.  COMPLICATIONS:  None.                       Impression   CONCLUSION:  Ultrasound-guided paracentesis was performed without complication.        LOCATION:  [facility]              Dictated by (CST): Dann Chavez MD on 2/19/2024 at 12:12 PM      Finalized by (CST): Dann Chavez MD on 2/19/2024 at 12:14 PM       Impression:   43 year old female PMHx of EtOH cirrhosis c/b ascites admitted with recurrent ascites with associated diffuse abdominal discomfort and SOB, s/p both diagnostic and therapeutic paracenteses, with ascitic fluid studies negative for SBP.   Decompensated EtOH cirrhosis c/b ascites - BP precludes use of diuretics. Given age and decompensation, pt should be seen at a tertiary care transplant center.   MELD 3.0 - 17  UTI - UA/Ucx + GNR, on Ceftriaxone   Constipation - Managed with Miralax daily   Hypotension - Managed with use of Midodrine   Recommendations:   Avoid diuretic therapy in setting of chronically low BP  Low Na+ diet, less than 2 g in 24 hour period.   Avoid NSAIDs.   May use Acetaminophen, not to exceed 2 g in 24 hr period.   Discussed diagnosis of cirrhosis and decompensation at length with pt.   Reinforced importance of abstinence from alcohol in an effort to slow progression of cirrhosis  Continue Miralax 17 g daily.   Protonix 40 mg IV BID  Analgesics and antiemetics per primary service.   Electrolyte replacement per hospital protocol   Plan to establish with tertiary center Hepatology as outpatient.   Ok to discharge from GI perspective    Thank you for the consultation, we will follow the patient with you. Attending addendum (Dr. LATOSHA Wesley) to follow with formal, final recommendations.   Angela Don, APRN  2:54 PM  2/19/2024  [facility] Gastroenterology  [phone]    GI attending addendum:    I have personally seen and examined this patient and agree with  above nurse practitioner's assessment and recommendations.     Briefly, patient is a 43-year-old female with chronic medical conditions including decompensated alcoholic cirrhosis complicated by ascites that presented to the ER yesterday with abdominal pain and distention.  She also was noted to have a fever at home.  Diagnostic paracentesis was performed in the ER and fluid studies are not consistent with SBP.  She then underwent a therapeutic paracentesis today with 2.9 L removed and notes significant improvement in her abdominal discomfort.  She does have some lower extremity discomfort however which persists.  She was noted to have an UTI in the ER and remains on Rocephin at this time.  She had previously been admitted recently for ascites and has been unable to take diuretics due to low blood pressures.  On physical exam, patient was sitting up comfortably crosslegged in bed.  Her abdomen soft, nontender, and nondistended as she was seen post paracentesis.  Patient with decompensated alcoholic cirrhosis complicated by ascites presented with abdominal pain and underwent a paracentesis today with 2.9 L removed.  Fluid studies from diagnostic paracentesis yesterday were not consistent with SBP.  At this time, cannot use diuretics given low blood pressures.  Continue low-sodium diet with 2 g sodium restriction.  Advised outpatient hepatology follow-up and referral has recently been placed for this.  Continue alcohol cessation.  At this time, given abdominal pain and discomfort have improved post paracentesis and no signs of SBP we will follow along peripherally.  Please call us back with any further questions.  Thank you for the consultation.    Israel Wesley,   9:56 PM  2/19/2024  [facility] Gastroenterology  [phone]

## 2024-02-19 NOTE — H&P
[facility]     Tamy Wall Patient Status:  Inpatient    1980 MRN [id number]   Location [facility] 3NW-A Attending Tc Quigley MD   Hosp Day # 0 PCP Karoline Burks DO     Chief Complaint: abd pain    Subjective:    History of Present Illness:     Tamy Wall is a 43 year old female with several days of worsening diffuse abdominal pain and distention, making it harder to breathe.  Also recently hospitalized for etoh hepatitis and flu s/p tamiflu trx.  Has had chills and tactile fevers at home last few days intermittently.  Last etoh drink 5 months ago.    History/Other:    Past Medical History:  Past Medical History:   Diagnosis Date    Fibroid, uterine     7cm , up to 12 cm at end of pregnacy     Past Surgical History:   Past Surgical History:   Procedure Laterality Date    APPENDECTOMY            [facility], fibroids    COLONOSCOPY N/A 2023    Procedure: COLONOSCOPY;  Surgeon: Israel Wesley DO;  Location:  ENDOSCOPY      Family History:   Family History   Problem Relation Age of Onset    Heart Disorder Mother     Other (fibroid) Mother        hypertension Social History:    reports that she has quit smoking. Her smoking use included cigarettes. She has never used smokeless tobacco. She reports that she does not drink alcohol and does not use drugs.     Allergies: No Known Allergies    Medications:    Current Facility-Administered Medications on File Prior to Encounter   Medication Dose Route Frequency Provider Last Rate Last Admin    [COMPLETED] midodrine (ProAmatine) tab 10 mg  10 mg Oral Once Douglas Zarate DO   10 mg at 24 0531    [COMPLETED] albumin human (Albumin) 25% injection 25 g  25 g Intravenous Once Li Bull MD   25 g at 24 1517    [COMPLETED] sodium chloride 0.9 % IV bolus 500 mL  500 mL Intravenous Once Douglas Zarate DO   Stopped at 24 0350    [COMPLETED]  furosemide (Lasix) tab 10 mg  10 mg Oral Once Halley Valdez DO   10 mg at 24 1405    [COMPLETED] spironolactone (Aldactone) tab 25 mg  25 mg Oral Once Halley Valdez DO   25 mg at 24 1404    [COMPLETED] midodrine (ProAmatine) tab 5 mg  5 mg Oral Once Melly Whelan MD   5 mg at 24 2052    [] sodium chloride 0.9% infusion   Intravenous Continuous Sosa Wood MD   Stopped at 24 2200    [COMPLETED] potassium chloride (K-Dur) tab 40 mEq  40 mEq Oral Q4H Sosa Wood MD   40 mEq at 24 1339    [COMPLETED] ketorolac (Toradol) 30 MG/ML injection 30 mg  30 mg Intravenous Once Sosa Wood MD   30 mg at 24 1034    [] sodium chloride 0.9% infusion   Intravenous Continuous Sosa Wood MD   Stopped at 24 0009    [COMPLETED] midodrine (ProAmatine) tab 2.5 mg  2.5 mg Oral Once Vicky Cordon DO   2.5 mg at 24 2144    [COMPLETED] sodium chloride 0.9 % IV bolus 500 mL  500 mL Intravenous Once Vicky Cordon DO   Stopped at 24 2224    [COMPLETED] potassium chloride (K-Dur) tab 40 mEq  40 mEq Oral Once Sosa Wood MD   40 mEq at 24 1328    [COMPLETED] ketorolac (Toradol) 15 MG/ML injection 15 mg  15 mg Intravenous Once Zena Wise MD   15 mg at 24 0046    [COMPLETED] potassium chloride (K-Dur) tab 40 mEq  40 mEq Oral Q4H Cuong Pagan MD   40 mEq at 24 1204    [COMPLETED] albumin human (Albumin) 25% injection 25 g  25 g Intravenous Once Arleen Stewart APRN   25 g at 24 1200    [COMPLETED] iopamidol 76% (ISOVUE-370) injection for power injector  65 mL Intravenous ONCE PRN Zena Wise MD   65 mL at 24 2214     Current Outpatient Medications on File Prior to Encounter   Medication Sig Dispense Refill    Cholecalciferol 125 MCG (5000 UT) Oral Tab Take 1 tablet (5,000 Units total) by mouth daily.      ibuprofen 400 MG Oral Tab Take 1 tablet (400 mg total) by mouth every 6 (six) hours as needed for Fever.       acetaminophen 325 MG Oral Tab Take 1 tablet (325 mg total) by mouth every 6 (six) hours as needed for Pain.      thiamine 100 MG Oral Tab Take 1 tablet (100 mg total) by mouth in the morning and 1 tablet (100 mg total) before bedtime. 60 tablet 0    folic acid 1 MG Oral Tab Take 1 tablet (1 mg total) by mouth daily. 30 tablet 2    midodrine 10 MG Oral Tab Take 1 tablet (10 mg total) by mouth in the morning and 1 tablet (10 mg total) at noon and 1 tablet (10 mg total) in the evening. 90 tablet 1    pantoprazole 40 MG Oral Tab EC Take 1 tablet (40 mg total) by mouth every morning before breakfast. 90 tablet 1    furosemide 20 MG Oral Tab Take 1 tablet (20 mg total) by mouth daily. Take if blood pressure >90/60 60 tablet 1       Review of Systems:   A comprehensive 12 point review of systems was completed.    Pertinent positives and negatives noted in the HPI.    Objective:   Physical Exam:    BP 96/68   Pulse 97   Temp 98.4 °F (36.9 °C) (Temporal)   Resp 22   Ht 5' 1\" (1.549 m)   Wt 117 lb (53.1 kg)   LMP 01/01/2024 (Approximate)   SpO2 98%   BMI 22.11 kg/m²   General: No acute distress, Alert  Respiratory: No rhonchi, no wheezes  Cardiovascular: S1, S2. Regular rate and rhythm  Abdomen: diffusely tender and distended  Neuro: No new focal deficits  Extremities: trace at worse LE edema      Results:    Labs:      Labs Last 24 Hours:    Recent Labs   Lab 02/18/24  1450   RBC 3.12*   HGB 10.7*   HCT 34.4*   .3*   MCH 34.3*   MCHC 31.1   RDW 13.0   NEPRELIM 12.60*   WBC 16.6*   .0       Recent Labs   Lab 02/18/24  1451   *   BUN 7*   CREATSERUM 0.59   EGFRCR 114   CA 8.7   ALB 1.9*      K 5.6*      CO2 26.0   ALKPHO 246*   *   ALT 39   BILT 2.7*   TP 5.8*       Lab Results   Component Value Date    INR 1.23 (H) 02/18/2024    INR 1.26 (H) 02/07/2024    INR 1.44 (H) 02/05/2024       Recent Labs   Lab 02/18/24  1451   TROPHS <3       No results for input(s): \"TROP\", \"PBNP\" in  the last 168 hours.    Recent Labs   Lab 02/18/24  1451   PCT 0.51*       Imaging: Imaging data reviewed in Epic.    Assessment & Plan:      #suspected SBP; empiric abx;  studies pending from ER diagnostic tap;  repeat para in AM for therapeutic purposes; GI consult;  will probably need diuresis after off NPO status;  start very gentle fluids in AM while NPO for para.      #mild hyperkalemia-hold potassium sparring meds; gentle fluids in AM while NPO;  recheck K in AM; no concerning EKG changes with hyperkalemia; s/p medical trx for hyperK in ER    #alcoholic hepatitis with ascites-last etoh drink 5 months ago    #recent flu; finished tamiflu.    #anemia-monitor    Holding lasix upon admit for now; re-assess Monday morning    Quality:  DVT prophylaxis:  SCDs, hold off lovenox for now awaiting therapeutic para  Code Status: see chart  Larose: NO  Larose Duration (in days): N/A  Central line: NO  Estimated discharge date: tbd    Plan of care discussed with patient and ER MD Art Hook MD    Supplementary Documentation:

## 2024-02-19 NOTE — PROGRESS NOTES
[facility]     Hospitalist Progress Note     Tamy Wall Patient Status:  Inpatient    1980 MRN [id number]   Location [facility] 3NW-A Attending Cuong Pagan MD   Hosp Day # 1 PCP Karoline Burks DO     Chief Complaint: Abdominal pain    Subjective:     Patient states pain improved, had paracentesis this morning with almost 3 liters removed, c/o lightheadedness, dizziness, BP borderline low.    Objective:    Review of Systems:   A comprehensive review of systems was completed; pertinent positive and negatives stated in subjective.    Vital signs:  Temp:  [98.1 °F (36.7 °C)-99.6 °F (37.6 °C)] 98.1 °F (36.7 °C)  Pulse:  [] 86  Resp:  [15-24] 18  BP: ()/(62-76) 93/62  SpO2:  [96 %-100 %] 97 %    Physical Exam:    General: No acute distress  Respiratory: No wheezes, no rhonchi  Cardiovascular: S1, S2, regular rate and rhythm  Abdomen: Soft, Non-tender, distended, positive bowel sounds  Neuro: No new focal deficits.   Extremities: No edema      Diagnostic Data:    Labs:  Recent Labs   Lab 24  1450 24  0523   WBC 16.6* 13.8*   HGB 10.7* 9.1*   .3* 109.3*   .0 221.0   INR 1.23*  --        Recent Labs   Lab 24  1451 24  0523   * 92   BUN 7* 5*   CREATSERUM 0.59 0.47*   CA 8.7 8.1*   ALB 1.9*  --     140   K 5.6* 4.1    108   CO2 26.0 27.0   ALKPHO 246*  --    *  --    ALT 39  --    BILT 2.7*  --    TP 5.8*  --        Estimated Creatinine Clearance: 115.3 mL/min (A) (based on SCr of 0.47 mg/dL (L)).    Recent Labs   Lab 24  1451   TROPHS <3       Recent Labs   Lab 24  1450   PTP 15.6*   INR 1.23*                  Microbiology    Hospital Encounter on 24   1. Body Fluid Cult Aerobic and Anaerobic     Status: None (Preliminary result)    Collection Time: 24  8:11 PM    Specimen: Abdominal fluid; Body fluid, unspecified   Result Value Ref Range    Body Fluid Smear No WBCs seen N/A    Body Fluid Smear No  organisms seen N/A   2. Urine Culture, Routine     Status: Abnormal (Preliminary result)    Collection Time: 02/18/24  2:50 PM    Specimen: Urine, clean catch   Result Value Ref Range    Urine Culture >100,000 CFU/ML Gram Negative Amol (A) N/A         Imaging: Reviewed in Epic.    Medications:    folic acid  1 mg Oral Daily    midodrine  10 mg Oral TID    pantoprazole  40 mg Oral QAM AC    thiamine  100 mg Oral BID    sodium phosphate  15 mmol Intravenous Once    magnesium oxide  400 mg Oral Once    pantoprazole  40 mg Intravenous Q12H    cefTRIAXone  2 g Intravenous Q24H       Assessment & Plan:      #suspected SBP; empiric abx;  studies pending from ER diagnostic tap;  repeat para in AM for therapeutic purposes; GI consult;  will probably need diuresis after off NPO status;  dc ivf as pt now eating     #mild hyperkalemia-hold potassium sparring meds; gentle fluids in AM while NPO;  recheck K in AM; no concerning EKG changes with hyperkalemia; s/p medical trx for hyperK in ER, resolved     #alcoholic hepatitis with ascites-last etoh drink 5 months ago     #recent flu; finished tamiflu.     #anemia-monitor    Cuong Pagan MD    Supplementary Documentation:     Quality:  DVT Mechanical Prophylaxis:   SCDs,    DVT Pharmacologic Prophylaxis   Medication   None                Code Status: Prior  Larose: No urinary catheter in place  Larose Duration (in days):   Central line:    MICKY:     Discharge is dependent on: progress  At this point Ms. Wall is expected to be discharge to: home    The 21st Century Cures Act makes medical notes like these available to patients in the interest of transparency. Please be advised this is a medical document. Medical documents are intended to carry relevant information, facts as evident, and the clinical opinion of the practitioner. The medical note is intended as peer to peer communication and may appear blunt or direct. It is written in medical language and may contain abbreviations  or verbiage that are unfamiliar.

## 2024-02-19 NOTE — PROGRESS NOTES
NURSING ADMISSION NOTE      Patient admitted via Cart  Oriented to room.  Safety precautions initiated.  Bed in low position.  Call light in reach.             Patient admitted to the floor in stable condition. VSS , on room air. Report 5/10 abd pain. Pain is tolerable at this time. Reports Constipation. Plan of care discussed at this time.

## 2024-02-19 NOTE — CM/SW NOTE
SDOH addressed with pt for finanical resources that were given to her upon her last admission.  Pt says she declines information about food insecurity and depression.  She is worried about paying her medical bills because she is  from her  (but lives in the basement of their home).  Encouraged her to call up the companies she owe money to and set up a payment plan or work out some financial plan with them.  She says she cannot work and may need a liver transplant.  Pt has medicaid which should cover most of her medical bills.

## 2024-02-20 LAB
MAGNESIUM SERPL-MCNC: 1.8 MG/DL (ref 1.6–2.6)
PHOSPHATE SERPL-MCNC: 3.1 MG/DL (ref 2.5–4.9)

## 2024-02-20 PROCEDURE — 99233 SBSQ HOSP IP/OBS HIGH 50: CPT | Performed by: HOSPITALIST

## 2024-02-20 RX ORDER — HYDROCODONE BITARTRATE AND ACETAMINOPHEN 5; 325 MG/1; MG/1
1 TABLET ORAL EVERY 6 HOURS PRN
Qty: 24 TABLET | Refills: 0 | Status: ON HOLD | OUTPATIENT
Start: 2024-02-20 | End: 2024-03-04

## 2024-02-20 RX ORDER — ALBUMIN (HUMAN) 12.5 G/50ML
25 SOLUTION INTRAVENOUS ONCE
Status: COMPLETED | OUTPATIENT
Start: 2024-02-20 | End: 2024-02-20

## 2024-02-20 RX ORDER — HYDROCODONE BITARTRATE AND ACETAMINOPHEN 5; 325 MG/1; MG/1
1 TABLET ORAL EVERY 6 HOURS PRN
Status: DISCONTINUED | OUTPATIENT
Start: 2024-02-20 | End: 2024-02-22

## 2024-02-20 RX ORDER — DIPHENHYDRAMINE HCL 25 MG
25 CAPSULE ORAL EVERY 6 HOURS PRN
Status: DISCONTINUED | OUTPATIENT
Start: 2024-02-20 | End: 2024-02-22

## 2024-02-20 RX ORDER — POLYETHYLENE GLYCOL 3350 17 G/17G
17 POWDER, FOR SOLUTION ORAL DAILY
Qty: 30 PACKET | Refills: 0 | Status: SHIPPED | OUTPATIENT
Start: 2024-02-20 | End: 2024-03-04

## 2024-02-20 RX ORDER — CEPHALEXIN 500 MG/1
500 CAPSULE ORAL 3 TIMES DAILY
Qty: 15 CAPSULE | Refills: 0 | Status: SHIPPED | OUTPATIENT
Start: 2024-02-20 | End: 2024-03-04

## 2024-02-20 NOTE — DISCHARGE INSTRUCTIONS
Will need to change drsgs as needed to keep dry    GI set up follow up referral to Dr Perez office at [facility]

## 2024-02-20 NOTE — PROGRESS NOTES
[facility]     Hospitalist Progress Note     Tamy Wall Patient Status:  Inpatient    1980 MRN [id number]   Location [facility] 3NW-A Attending Cuong Pagan MD   Hosp Day # 2 PCP Karoline Burks DO     Chief Complaint: Abdominal pain    Subjective:     Patient's BP remains low, c/o abdominal pain, states she is very weak, has leaking around puncture site for paracentesis.    Objective:    Review of Systems:   A comprehensive review of systems was completed; pertinent positive and negatives stated in subjective.    Vital signs:  Temp:  [98.1 °F (36.7 °C)-98.6 °F (37 °C)] 98.4 °F (36.9 °C)  Pulse:  [55-81] 60  Resp:  [18-20] 18  BP: (81-98)/(50-59) 90/55  SpO2:  [93 %-98 %] 98 %    Physical Exam:    General: No acute distress  Respiratory: No wheezes, no rhonchi  Cardiovascular: S1, S2, regular rate and rhythm  Abdomen: Soft, Non-tender, distended, positive bowel sounds  Neuro: No new focal deficits.   Extremities: No edema      Diagnostic Data:    Labs:  Recent Labs   Lab 24  1450 24  0523   WBC 16.6* 13.8*   HGB 10.7* 9.1*   .3* 109.3*   .0 221.0   INR 1.23*  --        Recent Labs   Lab 24  1451 24  0523   * 92   BUN 7* 5*   CREATSERUM 0.59 0.47*   CA 8.7 8.1*   ALB 1.9*  --     140   K 5.6* 4.1    108   CO2 26.0 27.0   ALKPHO 246*  --    *  --    ALT 39  --    BILT 2.7*  --    TP 5.8*  --        Estimated Creatinine Clearance: 115.3 mL/min (A) (based on SCr of 0.47 mg/dL (L)).    Recent Labs   Lab 24  1451   TROPHS <3       Recent Labs   Lab 24  1450   PTP 15.6*   INR 1.23*                  Microbiology    Hospital Encounter on 24   1. Body Fluid Cult Aerobic and Anaerobic     Status: None (Preliminary result)    Collection Time: 24  8:11 PM    Specimen: Abdominal fluid; Body fluid, unspecified   Result Value Ref Range    Body Fluid Smear No WBCs seen N/A    Body Fluid Smear No organisms seen N/A   2. Blood  Culture     Status: None (Preliminary result)    Collection Time: 02/18/24  5:12 PM    Specimen: Blood,peripheral   Result Value Ref Range    Blood Culture Result No Growth 1 Day N/A   3. Urine Culture, Routine     Status: Abnormal    Collection Time: 02/18/24  2:50 PM    Specimen: Urine, clean catch   Result Value Ref Range    Urine Culture >100,000 CFU/ML Escherichia coli (A) N/A       Susceptibility    Escherichia coli -  (no method available)     Ampicillin 8 Sensitive      Cefazolin <=4 Sensitive      Ciprofloxacin <=0.25 Sensitive      Gentamicin <=1 Sensitive      Meropenem <=0.25 Sensitive      Levofloxacin <=0.12 Sensitive      Nitrofurantoin <=16 Sensitive      Piperacillin + Tazobactam <=4 Sensitive      Trimethoprim/Sulfa <=20 Sensitive          Imaging: Reviewed in Epic.    Medications:    folic acid  1 mg Oral Daily    midodrine  10 mg Oral TID    pantoprazole  40 mg Oral QAM AC    thiamine  100 mg Oral BID    pantoprazole  40 mg Intravenous Q12H    cefTRIAXone  2 g Intravenous Q24H       Assessment & Plan:      #Acute decompensated Etoh cirrhosis; empiric abx;  studies negative for SBP, s/p paracentesis x 2, needs to be evaluated at transplant center, MELD 3.0 17  Low sodium diet  Avoid NSADs  Protonix  Miralax     #mild hyperkalemia-hold potassium sparring meds; gentle fluids in AM while NPO;  recheck K in AM; no concerning EKG changes with hyperkalemia; s/p medical trx for hyperK in ER, resolved     #alcoholic hepatitis with ascites-last etoh drink 5 months ago     #recent flu; finished tamiflu.     #anemia-monitor    Cuong Pagan MD    Supplementary Documentation:     Quality:  DVT Mechanical Prophylaxis:   SCDs,    DVT Pharmacologic Prophylaxis   Medication   None                Code Status: Prior  Larose: No urinary catheter in place  Larose Duration (in days):   Central line:    MICKY:     Discharge is dependent on: progress  At this point Ms. Wall is expected to be discharge to: home    The  21st Century Cures Act makes medical notes like these available to patients in the interest of transparency. Please be advised this is a medical document. Medical documents are intended to carry relevant information, facts as evident, and the clinical opinion of the practitioner. The medical note is intended as peer to peer communication and may appear blunt or direct. It is written in medical language and may contain abbreviations or verbiage that are unfamiliar.

## 2024-02-20 NOTE — SPIRITUAL CARE NOTE
Spiritual Care Visit Note    Patient Name: Tamy Wall Date of Spiritual Care Visit: 24   : 1980 Primary Dx: Abdominal pain of unknown etiology       Referred By:      Spiritual Care Taxonomy:    Intended Effects: Build relationship of care and support    Methods: Collaborate with care team member;Exploring hope;Offer emotional support;Offer spiritual/Mandaeism support    Interventions: Acknowledge current situation;Active listening;Ask guided questions;Ask guided questions about vamsi;Prayer for healing;Identify supportive relationship(s);Explain  role    Visit Type/Summary:     Offered empathic support with attentive listening.    Spiritual Care support can be requested via an Epic consult. For urgent/immediate needs, please contact the On Call  at: [facility]: ext [phone]

## 2024-02-20 NOTE — SPIRITUAL CARE NOTE
Spiritual Care Visit Note    Patient Name: Tamy Wall Date of Spiritual Care Visit: 24   : 1980 Primary Dx: Abdominal pain of unknown etiology       Referred By:      Spiritual Care Taxonomy:    Intended Effects: Build relationship of care and support    Methods: Offer support;Collaborate with care team member    Interventions: Assist someone with Advance Directives    Visit Type/Summary:     - PoA: Other: Provided education regarding PoA for Healthcare. Left PoA information with patient for review.   remains available for follow up.    Spiritual Care support can be requested via an [employer] consult. For urgent/immediate needs, please contact the On Call  at: [facility]: ext [phone]

## 2024-02-20 NOTE — PROGRESS NOTES
Pt is alert and oriented x4. Lungs are clear diminished on room air. Bowel sounds are active. Pt passed bowel movement this am. IV SL. Tolerating general diet. Abdomen is firm and rounded. Right abdominal paracentesis site leaking serous drainage. Site dressed with gauze abd and pressure tape. Plan to discharge late this afternoon. Discussed with patient who is in agreement. All questions addressed.     1600 IR called to notify of leakage from paracentesis site. They will send up an US tech to assess and glue the site.    1700 Paracentesis site glued by US tech. Telfa abd Tegaderm CDI.

## 2024-02-20 NOTE — PROGRESS NOTES
Gastroenterology Progress Note  Tamy Wall Patient Status:  Inpatient    1980 MRN [id number]   Location [facility] 3NW-A Attending Cuong Pagan MD   Hosp Day # 2 PCP Karoline Burks DO     Chief Complaint: Leaking paracentesis site  S: Called back to assess US guided paracentesis site as pt reporting significant non-bloody output from puncture site. Per bedside RN, pt with large vol clear liquid saturating sheet. No abd pain or nausea. No overt GI bleeding. No fevers.   O: BP (!) 86/60 (BP Location: Left arm)   Pulse 60   Temp 99 °F (37.2 °C) (Oral)   Resp 18   Ht 5' 1\" (1.549 m)   Wt 119 lb 3.2 oz (54.1 kg)   LMP 2024 (Approximate)   SpO2 98%   BMI 22.52 kg/m²   Gen: AAOx3  CV: RRR with normal S1 / S2  Resp: CTA bilaterally; No increase in respiratory effort   Abd: (+)BS, soft, diffuse right sided tenderness, distended; no rebound or guarding; RLQ paracentesis site covered with gauze dressing currently wet with yellow tinged output   Ext: + edema   Skin: Warm and dry  Neuro: no asterixis; anxious   Laboratory Data:       Recent Labs   Lab 24  1813   PGLU 99     Recent Labs   Lab 24  1450   INR 1.23*         Recent Labs   Lab 24  1450 24  0523   WBC 16.6* 13.8*   HGB 10.7* 9.1*   .0 221.0       Recent Labs   Lab 24  1451 24  0523    140   K 5.6* 4.1    108   CO2 26.0 27.0   BUN 7* 5*   CREATSERUM 0.59 0.47*       Recent Labs   Lab 24  1451   ALT 39   *     Imaging:  PROCEDURE:  US PARACENTESIS W IMAGING  (CPT=49083)     COMPARISON:  [facility] , CT, CTA CHEST + CT ABD (W) + CT PEL (W) SH(CPT=71275/08823), 2024, 5:38 PM.  [facility] US, US PARACENTESIS W IMAGING (CPT=49083), 2024, 10:33 AM.     INDICATIONS:  Ascites     DESCRIPTION OF PROCEDURE:  Informed consent was obtained.  The patient was prepped and draped in the standard sterile fashion.  An ultrasound-guided  paracentesis was performed in the usual sterile manner.     PATIENT STATED HISTORY: (As transcribed by Technologist)         LOCATION:  Right lower quadrant  FLUID REMOVED:  2.9 L  MEDICATION:  9 cc of 1% Lidocaine for local anesthetic.  COMPLICATIONS:  None.     Impression   CONCLUSION:  Ultrasound-guided paracentesis was performed without complication.      LOCATION:  [facility]     Dictated by (CST): Dann Chavez MD on 2/19/2024 at 12:12 PM      Finalized by (CST): Dann Chavez MD on 2/19/2024 at 12:14 PM     Assessment: 43 yr-old female with EtOH cirrhosis c/b ascites admitted 2/18 with abd pain + distention in setting of progressive ascites s/p US guided paracentesis yesterday removing 2.9 L. Pt now leaking asitic fluid from puncture site. Diuretics held d/t hypotension. Defer suture of paracentesis site VS repeat US guided paracentesis (unclear if pocket of fluid available for drainage again in only 24 hrs). Will re-consult IR and monitor pt overnight with plan for close outpt follow-up and tertiary care referral as previously planned   Plan:   Contact IR to discuss possible suture at paracentesis site vs attempt at repeat US guided paracentesis   Will monitor output overnight following #1  Office contacted to arrange a follow-up office visit with [facility] in 1 week + to see status of pt's referral to hepatology associated with a liver transplant center  Continue to hold diuretics   Maintain low Na diet  Case discussed and reviewed with Dr ASHLEIGH Garza, SILVANA  3:21 PM  2/20/2024  [facility]  [phone]    GI Attending Attestation    I have personally reviewed the above history, physical exam, but have not personally examined the patient. I agree with the stated treatment plan.     6:52 PM  2/20/2024  Israel Wesley,

## 2024-02-20 NOTE — SPIRITUAL CARE NOTE
Spiritual Care Visit Note    Patient Name: Tamy Wall Date of Spiritual Care Visit: 24   : 1980 Primary Dx: Abdominal pain of unknown etiology       Referred By:      Spiritual Care Taxonomy:    Intended Effects: Convey a calming presence    Methods: Collaborate with care team member;Offer spiritual/Zoroastrianism support    Interventions: Prayer for healing    Visit Type/Summary:     - Spiritual Care: Responded to a request for spiritual care and assessed the patient for spiritual care needs. Consulted with RN prior to visit. Offered empathic listening and emotional support. Provided support for Patient's spiritual/Zoroastrianism requests. Provided Communion and verified NPO status.  remains available for follow up.    Spiritual Care support can be requested via an Epic consult. For urgent/immediate needs, please contact the On Call  at: [facility]: ext [phone]

## 2024-02-20 NOTE — PROGRESS NOTES
Patient alert and oriented x4, vital signs stable on room air. Pain controlled with prn norco. Voids, up at santosh. Safety measures in place, questions addressed, plan of care discussed with patient.    2354: Dressing changed over site of paracentesis, moderate amount of serous drainage.

## 2024-02-20 NOTE — PHYSICAL THERAPY NOTE
PHYSICAL THERAPY EVALUATION - INPATIENT     Room Number: 314/314-A  Evaluation Date: 2/20/2024  Type of Evaluation: Initial  Physician Order: PT Eval and Treat    Presenting Problem: abdominal dominik n  Co-Morbidities : alcohol abuse  Reason for Therapy: Mobility Dysfunction and Discharge Planning    PHYSICAL THERAPY ASSESSMENT   Patient is currently functioning near baseline with bed mobility, transfers, and gait.  Prior to admission, patient's baseline is short distance household ambulation indep, receives assist with LBD, and has difficulty picking up things from the ground.  Patient is requiring contact guard assist as a result of the following impairments: pain and impaired dynamic balance declined gait training with RW which likely would have improved dynamic balance deficits. Physical Therapy will continue to follow for duration of hospitalization.    Patient will benefit from continued skilled PT Services at discharge to promote functional independence in home.  Anticipate patient will return home with OP PT.    PLAN  PT Treatment Plan: Bed mobility;Body mechanics;Coordination;Endurance;Energy conservation;Patient education;Family education;Gait training;Neuromuscular re-educate;Range of motion;Strengthening;Stoop training;Stair training;Transfer training;Balance training  Rehab Potential : Good  Frequency (Obs): 3-5x/week  Number of Visits to Meet Established Goals: 5      CURRENT GOALS    Goal #1 Patient is able to demonstrate supine - sit EOB @ level: independent     Goal #2 Patient is able to demonstrate transfers EOB to/from Chair/Wheelchair at assistance level: supervision     Goal #3 Patient is able to ambulate 150 feet with assist device:  LRAD  at assistance level: supervision     Goal #4 Patient is able to navigate stairs with rail and SBA   Goal #5    Goal #6    Goal Comments: Goals established on 2/20/2024      PHYSICAL THERAPY MEDICAL/SOCIAL HISTORY  History related to current admission:  Patient is a 43 year old female admitted on 2/18/2024 : Presented with \" several days of worsening diffuse abdominal pain and distention\", recent admit 2/1-2/8/24 for ETOH hepatitis and flu (not seen by therapy) s/p para 2/19 for ascites per EMR \"needs to be evaluated at a transplant center\"       HOME SITUATION  Type of Home: House   Home Layout: Two level  Stairs to Enter : 2  Railing: Yes  Stairs to Bedroom: 14       Lives With: Spouse;Daughter;Parent(s)  Drives: Yes  Patient Owned Equipment: None  Patient Regularly Uses: None    Prior Level of [place]: pt reports normally indep with ambulation, has been having difficulty with ADLs due to pain and dynamic balance deficits with dizziness, family assists with LBD    SUBJECTIVE  \" I have trouble with this\" - pt unprompted kneeled down on ground       OBJECTIVE  Precautions: Bed/chair alarm  Fall Risk: High fall risk    WEIGHT BEARING RESTRICTION  Weight Bearing Restriction: None                PAIN ASSESSMENT  Rating: Unable to rate  Location: abdomen  Management Techniques: Activity promotion;Body mechanics;Repositioning    COGNITION  Safety Judgement:  decreased awareness of need for safety    RANGE OF MOTION AND STRENGTH ASSESSMENT  Upper extremity ROM and strength are within functional limits     Lower extremity ROM is within functional limits     Lower extremity strength is within functional limits       BALANCE  Static Sitting: Good  Dynamic Sitting: Good  Static Standing: Fair -  Dynamic Standing: Fair -    ADDITIONAL TESTS                                    ACTIVITY TOLERANCE           BP: (!) 86/60  BP Location: Left arm  BP Method: Automatic  Patient Position: Standing    O2 WALK       NEUROLOGICAL FINDINGS                        AM-PAC '6-Clicks' INPATIENT SHORT FORM - BASIC MOBILITY  How much difficulty does the patient currently have...  Patient Difficulty: Turning over in bed (including adjusting bedclothes, sheets and blankets)?: None   Patient  Difficulty: Sitting down on and standing up from a chair with arms (e.g., wheelchair, bedside commode, etc.): None   Patient Difficulty: Moving from lying on back to sitting on the side of the bed?: None   How much help from another person does the patient currently need...   Help from Another: Moving to and from a bed to a chair (including a wheelchair)?: None   Help from Another: Need to walk in hospital room?: A Little   Help from Another: Climbing 3-5 steps with a railing?: A Little       AM-PAC Score:  Raw Score: 22   Approx Degree of Impairment: 20.91%   Standardized Score (AM-PAC Scale): 53.28   CMS Modifier (G-Code): CJ    FUNCTIONAL ABILITY STATUS  Gait Assessment   Functional Mobility/Gait Assessment  Gait Assistance: Contact guard assist  Distance (ft): 40  Assistive Device: None (pt declined recommendation for RW)  Pattern: Shuffle    Skilled Therapy Provided     Bed Mobility:  Rolling: indep  Supine to sit: mod I    Sit to supine: mod I      Transfer Mobility:  Sit to stand: CGA   Stand to sit: CGA  Gait = CGA    Therapist's Comments: PT reporting constant dizziness throughout session. BP assessed throughout session  90/63 sitting EOB,88/58 standing, 86/60 standing post ambulation - hypotensive but readings comparable to previous readings with minimal change with position changes, RN aware. Therapist educated pt on strategies to minimize forward flexion to pick items up off ground and don socks - pt declined attempting strategies. Pt demonstrating gait abnormalities and dynamic balance impairments - pt declined gait training with RW - agreeable to CGA through gait belt from therapist and holding on to fox rail. At end of session pt demonstrated controlled stand to floor transfer unprompted and questioned strategies to return to standing as she has difficulty with this at home, educated on technique with pt requiring mod A to complete transfer - recommend cont assist from family.     Exercise/Education  Provided:  Functional activity tolerated  Gait training  Strengthening  Transfer training    Patient End of Session: In bed;Needs met;Call light within reach;RN aware of session/findings;All patient questions and concerns addressed;Alarm set      Patient Evaluation Complexity Level:  History Low - no personal factors and/or co-morbidities   Examination of body systems Low - addressing 1-2 elements   Clinical Presentation Low - Stable   Clinical Decision Making Low - Stable       PT Session Time: 20 minutes  Gait Training:  minutes  Therapeutic Activity: 10 minutes  Neuromuscular Re-education:  minutes  Therapeutic Exercise:  minutes

## 2024-02-21 ENCOUNTER — APPOINTMENT (OUTPATIENT)
Dept: ULTRASOUND IMAGING | Facility: HOSPITAL | Age: 44
End: 2024-02-21
Attending: NURSE PRACTITIONER
Payer: MEDICAID

## 2024-02-21 PROBLEM — D63.8 ANEMIA OF CHRONIC DISEASE: Status: ACTIVE | Noted: 2024-02-21

## 2024-02-21 LAB
ANION GAP SERPL CALC-SCNC: 4 MMOL/L (ref 0–18)
BASOPHILS # BLD AUTO: 0.04 X10(3) UL (ref 0–0.2)
BASOPHILS NFR BLD AUTO: 0.4 %
BUN BLD-MCNC: 3 MG/DL (ref 9–23)
CALCIUM BLD-MCNC: 8.1 MG/DL (ref 8.5–10.1)
CHLORIDE SERPL-SCNC: 107 MMOL/L (ref 98–112)
CO2 SERPL-SCNC: 27 MMOL/L (ref 21–32)
CREAT BLD-MCNC: 0.69 MG/DL
EGFRCR SERPLBLD CKD-EPI 2021: 110 ML/MIN/1.73M2 (ref 60–?)
EOSINOPHIL # BLD AUTO: 0.1 X10(3) UL (ref 0–0.7)
EOSINOPHIL NFR BLD AUTO: 1 %
ERYTHROCYTE [DISTWIDTH] IN BLOOD BY AUTOMATED COUNT: 12.9 %
GLUCOSE BLD-MCNC: 131 MG/DL (ref 70–99)
HCT VFR BLD AUTO: 29.1 %
HGB BLD-MCNC: 9.2 G/DL
IMM GRANULOCYTES # BLD AUTO: 0.04 X10(3) UL (ref 0–1)
IMM GRANULOCYTES NFR BLD: 0.4 %
LYMPHOCYTES # BLD AUTO: 2.21 X10(3) UL (ref 1–4)
LYMPHOCYTES NFR BLD AUTO: 21.4 %
MAGNESIUM SERPL-MCNC: 1.7 MG/DL (ref 1.6–2.6)
MCH RBC QN AUTO: 34.3 PG (ref 26–34)
MCHC RBC AUTO-ENTMCNC: 31.6 G/DL (ref 31–37)
MCV RBC AUTO: 108.6 FL
MONOCYTES # BLD AUTO: 0.98 X10(3) UL (ref 0.1–1)
MONOCYTES NFR BLD AUTO: 9.5 %
NEUTROPHILS # BLD AUTO: 6.97 X10 (3) UL (ref 1.5–7.7)
NEUTROPHILS # BLD AUTO: 6.97 X10(3) UL (ref 1.5–7.7)
NEUTROPHILS NFR BLD AUTO: 67.3 %
OSMOLALITY SERPL CALC.SUM OF ELEC: 284 MOSM/KG (ref 275–295)
PLATELET # BLD AUTO: 150 10(3)UL (ref 150–450)
POTASSIUM SERPL-SCNC: 4 MMOL/L (ref 3.5–5.1)
RBC # BLD AUTO: 2.68 X10(6)UL
SODIUM SERPL-SCNC: 138 MMOL/L (ref 136–145)
WBC # BLD AUTO: 10.3 X10(3) UL (ref 4–11)

## 2024-02-21 PROCEDURE — 99232 SBSQ HOSP IP/OBS MODERATE 35: CPT | Performed by: INTERNAL MEDICINE

## 2024-02-21 PROCEDURE — 76705 ECHO EXAM OF ABDOMEN: CPT | Performed by: NURSE PRACTITIONER

## 2024-02-21 RX ORDER — ALBUMIN (HUMAN) 12.5 G/50ML
25 SOLUTION INTRAVENOUS ONCE
Status: COMPLETED | OUTPATIENT
Start: 2024-02-21 | End: 2024-02-22

## 2024-02-21 RX ORDER — FUROSEMIDE 20 MG/1
10 TABLET ORAL DAILY
Status: SHIPPED | COMMUNITY
Start: 2024-02-21 | End: 2024-02-22

## 2024-02-21 RX ORDER — FUROSEMIDE 20 MG/1
10 TABLET ORAL DAILY
Status: DISCONTINUED | OUTPATIENT
Start: 2024-02-21 | End: 2024-02-22

## 2024-02-21 RX ORDER — MAGNESIUM OXIDE 400 MG/1
400 TABLET ORAL ONCE
Status: COMPLETED | OUTPATIENT
Start: 2024-02-21 | End: 2024-02-21

## 2024-02-21 NOTE — PLAN OF CARE
Spoke w/ Dr Mcdonough   cleared for dc needs to fu w/ GI and at [facility], glue has stopped leakage- still w c/o abd pain. Stat US and  if needed repeat para. Per Dr Wesley. US ordered.   High risk of readmission.  Sbp better today     Pt c/o r sided abd pain  - no drainage but w/ r sided abd bloating. US coming up now   Stressed need for total absence from etoh and need for close f/u at discharge.     Treasure reyes, NP

## 2024-02-21 NOTE — PROGRESS NOTES
Patient alert and oriented x4, vital signs stable on room air. Up at santosh, pain controlled with  prn norco. Paracentesis site closed with skin glue. Saline locked. Safety measures in place, questions addressed, plan of care discussed with patient.

## 2024-02-21 NOTE — IMAGING NOTE
Floor RN - Abhilash notified to keep patient clear liquid for procedure, last PO intake of food was 3pm per patient.  Hold all blood thinners (patient verified not taking blood thinners), saline lock IV, patient consentable, tentative procedure time pending US availability.  Order and Draw stat PT INR.  RN verbalized understanding.

## 2024-02-21 NOTE — PROGRESS NOTES
Gastroenterology Progress Note  Tamy Wall Patient Status:  Inpatient    1980 MRN [id number]   Location [facility] 3NW-A Attending Luz Maria Albert MD   Hosp Day # 3 PCP Karoline Burks DO     Chief Complaint: EtOH cirrhosis c/b ascites   S: No further drainage from US puncture site after skin glue was applied yesterday afternoon. Pt reports abd discomfort, more right sided with radiation to flank. No nausea or vomiting. + chills without fevers. No diarrhea or overt GI Bleeding. No confusion per bedside RN  O: BP 93/61 (BP Location: Left arm)   Pulse 74   Temp 99.1 °F (37.3 °C) (Oral)   Resp 18   Ht 5' 1\" (1.549 m)   Wt 119 lb 3.2 oz (54.1 kg)   LMP 2024 (Approximate)   SpO2 100%   BMI 22.52 kg/m²   Gen: AAOx3  CV: RRR with normal S1 / S2  Resp: CTA bilaterally; No increase in respiratory effort   Abd: (+)BS, soft, diffuse right sided tenderness, distended; no rebound or guarding; RLQ paracentesis site without drainage at this time   Ext: mild leg edema; no cyanosis  Skin: Warm and dry  Neuro: No asterixis   Laboratory Data:       Recent Labs   Lab 24  1813   PGLU 99     Recent Labs   Lab 24  1450   INR 1.23*         Recent Labs   Lab 24  1450 24  0523 24  0550   WBC 16.6* 13.8* 10.3   HGB 10.7* 9.1* 9.2*   .0 221.0 150.0       Recent Labs   Lab 24  1451 24  0523 24  0550    140 138   K 5.6* 4.1 4.0    108 107   CO2 26.0 27.0 27.0   BUN 7* 5* 3*   CREATSERUM 0.59 0.47* 0.69       Recent Labs   Lab 24  1451   ALT 39   *     Assessment: 43 yr-old female with EtOH cirrhosis c/b ascites admitted  with abd pain + distention in setting of progressive ascites s/p US guided paracentesis yesterday removing 2.9 L. Pt leaking asitic fluid from puncture site  and now s/p skin glue without further leakage. Diuretics held d/t hypotension. Pt with ongoing pain--will  consider repeat US paracentesis however risk of infection, bleeding, perforation remain and pt with recent paracentesis and no SBP  Plan:   Consider repeat US guided paracentesis--if procedure to be completed will plan on collecting fluid for cell count and cx  Continue to hold diuretics  Maintain low Na diet  Continue strict and complete EtOH abstinence  DVT prophylaxis per Hospitalist recommendations   Office contacted to arrange a [facility] + to see status of pt's referral to hepatology associated with a liver transplant center   Case discussed and reviewed with Dr ASHLEIGH Garza, APRN  1:40 PM  2/21/2024  [facility]  [phone]    GI attending addendum:    I have personally seen and examined this patient and agree with above nurse practitioner's assessment and recommendations.     Patient notes continued abdominal pain and distention and believes she was filling back up with ascites.  Ultrasound was performed without significant ascites noted.  On physical exam, patient was sitting up comfortably in bed.  Her abdomen was distended and firm, and tender to palpation.  Given no further significant ascites noted repeat ultrasound, patient may be discharged from GI perspective.  She will need continued hepatology follow-up with referral already placed.  She will also need to follow-up with us as well.  She may need serial paracentesis in the future as we are limited with diuretic use given her low blood pressures.  Thank you for the consultation.    Israel Wesley DO  6:17 PM  2/21/2024  [facility]  [phone]

## 2024-02-22 VITALS
WEIGHT: 119.19 LBS | DIASTOLIC BLOOD PRESSURE: 56 MMHG | OXYGEN SATURATION: 97 % | BODY MASS INDEX: 22.5 KG/M2 | HEIGHT: 61 IN | SYSTOLIC BLOOD PRESSURE: 92 MMHG | HEART RATE: 72 BPM | TEMPERATURE: 99 F | RESPIRATION RATE: 18 BRPM

## 2024-02-22 PROBLEM — B96.20 E. COLI UTI: Status: ACTIVE | Noted: 2024-02-22

## 2024-02-22 PROBLEM — N39.0 E. COLI UTI: Status: ACTIVE | Noted: 2024-02-22

## 2024-02-22 LAB — MAGNESIUM SERPL-MCNC: 1.8 MG/DL (ref 1.6–2.6)

## 2024-02-22 PROCEDURE — 99239 HOSP IP/OBS DSCHRG MGMT >30: CPT | Performed by: INTERNAL MEDICINE

## 2024-02-22 RX ORDER — MAGNESIUM OXIDE 400 MG/1
400 TABLET ORAL ONCE
Status: COMPLETED | OUTPATIENT
Start: 2024-02-22 | End: 2024-02-22

## 2024-02-22 RX ORDER — BENZONATATE 200 MG/1
200 CAPSULE ORAL 3 TIMES DAILY PRN
Qty: 15 CAPSULE | Refills: 0 | Status: SHIPPED | OUTPATIENT
Start: 2024-02-22 | End: 2024-02-22

## 2024-02-22 RX ORDER — BENZONATATE 200 MG/1
200 CAPSULE ORAL 3 TIMES DAILY PRN
Qty: 15 CAPSULE | Refills: 0 | Status: SHIPPED | OUTPATIENT
Start: 2024-02-22 | End: 2024-02-29 | Stop reason: ALTCHOICE

## 2024-02-22 RX ORDER — FUROSEMIDE 20 MG/1
10 TABLET ORAL DAILY
Qty: 30 TABLET | Refills: 0 | Status: SHIPPED | OUTPATIENT
Start: 2024-02-22 | End: 2024-03-04

## 2024-02-22 NOTE — SPIRITUAL CARE NOTE
Spiritual Care Visit Note    Patient Name: Tamy Wall Date of Spiritual Care Visit: 24   : 1980 Primary Dx: Abdominal pain of unknown etiology       Referred By: Referral From: Patient    Spiritual Care Taxonomy:    Intended Effects: Build relationship of care and support    Methods: Demonstrate acceptance;Encouraging spiritual/Catholic practices;Encourage self reflection;Encourage self care;Explore presence of God;Explore nature of God    Interventions: Prayer for healing;Provde spiritual/Catholic resources;Share words of hope and inspiration    Visit Type/Summary:    Tamy told  about her regrets for use of alcohol and fears of future due to health.  discussed [place] and other teachings from her vamsi regarding life and the next stage.  provided a meditation from free on-line site (Monthly Audio Meditations from [employer] - we listened to 2 3 minutes meditations from year 2017 entitled Peace in the Midst and Love). Tamy said she enjoyed these and was more peaceful.  Other meditations are available here as well.  [employer] is a source for finding peaceful readings and resources. [employer] offers resources for socialization and spiritual growth.  discussed his own positive experiences with them.   will return in the morning for communion.     Spiritual Care support can be requested via an Epic consult. For urgent/immediate needs, please contact the On Call  at: [place]: ext [phone]

## 2024-02-22 NOTE — PROGRESS NOTES
[facility]     Hospitalist Progress Note     Tamy Wall Patient Status:  Inpatient    1980 MRN [id number]   Location [facility] 3NW-A Attending Cuong Pagan MD   Hosp Day # 4 PCP Karoline Burks DO     Chief Complaint: Abdominal pain    Subjective:     Blood pressure remains stable overnight, received low-dose of Lasix this morning.  Abdominal wall discomfort/pain remains the same according to patient.  Had limited abdominal ultrasound yesterday evening which only showed trace ascites not enough to be tapped, okay for discharge by GI    Objective:    Review of Systems:   A comprehensive review of systems was completed; pertinent positive and negatives stated in subjective.    Vital signs:  Temp:  [98.2 °F (36.8 °C)-99.8 °F (37.7 °C)] 99 °F (37.2 °C)  Pulse:  [72-85] 72  Resp:  [18] 18  BP: (88-98)/(52-64) 91/62  SpO2:  [97 %-99 %] 97 %    Physical Exam:    BP 91/62 (BP Location: Left arm)   Pulse 72   Temp 99 °F (37.2 °C) (Oral)   Resp 18   Ht 61\"   Wt 119 lb 3.2 oz (54.1 kg)   LMP 2024 (Approximate)   SpO2 97%   BMI 22.52 kg/m²     General: No acute distress  Respiratory: Clear to auscultation bilateral, no wheezes, no rhonchi  Cardiovascular: S1, S2, regular rate and rhythm  Abdomen: Soft, mild distended, tenderness in right sided abdomen and in the right flank, positive bowel sounds.  Right lower quadrant paracentesis site with no drainage at this time  Neuro: Awake alert, no new focal deficits.   Extremities: Trace pedal edema      Diagnostic Data:    Labs:  Recent Labs   Lab 24  1450 24  0523 24  0550   WBC 16.6* 13.8* 10.3   HGB 10.7* 9.1* 9.2*   .3* 109.3* 108.6*   .0 221.0 150.0   INR 1.23*  --   --        Recent Labs   Lab 24  1451 24  0523 24  0550   * 92 131*   BUN 7* 5* 3*   CREATSERUM 0.59 0.47* 0.69   CA 8.7 8.1* 8.1*   ALB 1.9*  --   --     140 138   K 5.6* 4.1 4.0    108 107   CO2 26.0 27.0 27.0    ALKPHO 246*  --   --    *  --   --    ALT 39  --   --    BILT 2.7*  --   --    TP 5.8*  --   --        Estimated Creatinine Clearance: 78.5 mL/min (based on SCr of 0.69 mg/dL).    Recent Labs   Lab 02/18/24  1451   TROPHS <3       Recent Labs   Lab 02/18/24  1450   PTP 15.6*   INR 1.23*                  Microbiology    Hospital Encounter on 02/18/24   1. Body Fluid Cult Aerobic and Anaerobic     Status: None (Preliminary result)    Collection Time: 02/18/24  8:11 PM    Specimen: Abdominal fluid; Body fluid, unspecified   Result Value Ref Range    Body Fluid Culture Result No Growth 3 Days N/A    Body Fluid Smear No WBCs seen N/A    Body Fluid Smear No organisms seen N/A   2. Blood Culture     Status: None (Preliminary result)    Collection Time: 02/18/24  5:12 PM    Specimen: Blood,peripheral   Result Value Ref Range    Blood Culture Result No Growth 3 Days N/A   3. Urine Culture, Routine     Status: Abnormal    Collection Time: 02/18/24  2:50 PM    Specimen: Urine, clean catch   Result Value Ref Range    Urine Culture >100,000 CFU/ML Escherichia coli (A) N/A       Susceptibility    Escherichia coli -  (no method available)     Ampicillin 8 Sensitive      Cefazolin <=4 Sensitive      Ciprofloxacin <=0.25 Sensitive      Gentamicin <=1 Sensitive      Meropenem <=0.25 Sensitive      Levofloxacin <=0.12 Sensitive      Nitrofurantoin <=16 Sensitive      Piperacillin + Tazobactam <=4 Sensitive      Trimethoprim/Sulfa <=20 Sensitive          Imaging: Reviewed in Epic.    Medications:    furosemide  10 mg Oral Daily    folic acid  1 mg Oral Daily    midodrine  10 mg Oral TID    pantoprazole  40 mg Oral QAM AC    thiamine  100 mg Oral BID    pantoprazole  40 mg Intravenous Q12H    cefTRIAXone  2 g Intravenous Q24H       Assessment & Plan:      #Acute decompensated Etoh cirrhosis with recurrent ascites and abdominal pain and hypotension and hypoalbuminemia  -On empiric abx with IV Rocephin;  studies negative for SBP,  s/p paracentesis x 2, seen by GI who advised outpatient follow-up with hepatology at transplant cent[facility]  Low sodium diet  Avoid NSADs  Protonix  Miralax  Albumin infusion for hypotension and recurrent ascites and hypoalbuminemia and edema  On midodrine for hypotension.  Home Lasix was on hold in hospital for hypotension, which was restarted at a lower dose after albumin infusion  with plans to hold if systolic blood pressure less than 90-tolerating so far.    # E. coli UTI present on admission  -Urine culture sent on admission came back with E. coli more than 100,000 sensitive to all antibiotics  -Was on IV Rocephin daily since admission, patient received 4 doses of this in the hospital.  Will continue oral antibiotics Keflex at the time of discharge to complete full course of antibiotics     #mild hyperkalemia on admission  -s/p medical trx for hyperK in ER, hyperkalemia resolved     #alcoholic hepatitis with ascites-last etoh drink 5 months ago     #recent flu influenza H1N1 on 2/4/2024; finished tamiflu.  On Tessalon pearls as needed for cough     # Macrocytic anemia of chronic disease due to liver disease and history of alcohol use  -monitor CBC  -On chart review patient had vitamin B12 more than 2000 on 6/14/2023  -Continue folic acid and thiamine supplements    Discharge planning for today, okayed by GI for discharge.  Follow-up with GI as outpatient as directed.  Follow-up with regular outpatient primary care physician Karoline Burks DO within 1 week in office    Luz Maria Albert MD  2/22/2024        Supplementary Documentation:     Quality:  DVT Mechanical Prophylaxis:   SCDs,    DVT Pharmacologic Prophylaxis   Medication   None                Code Status: Prior  Larose: No urinary catheter in place  Larose Duration (in days):   Central line:    MICKY: 2/21/2024    Discharge is dependent on: progress  At this point Ms. Wall is expected to be discharge to: home    The 21st Century Cures Act makes medical notes  like these available to patients in the interest of transparency. Please be advised this is a medical document. Medical documents are intended to carry relevant information, facts as evident, and the clinical opinion of the practitioner. The medical note is intended as peer to peer communication and may appear blunt or direct. It is written in medical language and may contain abbreviations or verbiage that are unfamiliar.

## 2024-02-22 NOTE — PROGRESS NOTES
Patient alert and oriented x4, vital signs stable on room air. Pain controlled with prn norco, up at santosh to void. Saline locked. Plan to DC tomorrow. Safety measures in place, questions addressed, plan of care discussed with patient.

## 2024-02-22 NOTE — CM/SW NOTE
02/22/24 1200   Discharge disposition   Expected discharge disposition Home or Self   Discharge transportation [facility]     TRUDY informed pt medically cleared for dc, pt requesting transport. Pt has Medicaid, will need [facility] ([phone]).    TRUDY called [facility] for 3:45 pm. PCS done. RN updated.    Addendum 0560  TRUDY faxed AVS [facility].    [facility] [phone]     TRUDY to remain available for dc planning, and/or additional need for support.    Ryne Antonio, MK  Discharge Planner  [phone]

## 2024-02-22 NOTE — PROGRESS NOTES
[facility]     Hospitalist Progress Note     Tamy Wall Patient Status:  Inpatient    1980 MRN [id number]   Location [facility] 3NW-A Attending Cuong Pagan MD   Hosp Day # 3 PCP Karoline Burks DO     Chief Complaint: Abdominal pain    Subjective:     Patient's BP remains low, c/o abdominal pain, no more leaking around puncture site for paracentesis.  Low-grade fever with temperature of 99.1 °F    Objective:    Review of Systems:   A comprehensive review of systems was completed; pertinent positive and negatives stated in subjective.    Vital signs:  Temp:  [98.5 °F (36.9 °C)-99.8 °F (37.7 °C)] 99.8 °F (37.7 °C)  Pulse:  [67-77] 74  Resp:  [16-18] 18  BP: (86-97)/(55-62) 88/58  SpO2:  [94 %-100 %] 100 %    Physical Exam:    BP (!) 88/58 (BP Location: Left arm)   Pulse 74   Temp 99.8 °F (37.7 °C) (Oral)   Resp 18   Ht 61\"   Wt 119 lb 3.2 oz (54.1 kg)   LMP 2024 (Approximate)   SpO2 100%   BMI 22.52 kg/m²     General: No acute distress  Respiratory: Clear to auscultation bilateral, no wheezes, no rhonchi  Cardiovascular: S1, S2, regular rate and rhythm  Abdomen: Soft, mild distended, tenderness in right sided abdomen and in the right flank, positive bowel sounds.  Right lower quadrant paracentesis site with no drainage at this time  Neuro: Awake alert, no new focal deficits.   Extremities: Trace pedal edema      Diagnostic Data:    Labs:  Recent Labs   Lab 24  1450 24  0523 24  0550   WBC 16.6* 13.8* 10.3   HGB 10.7* 9.1* 9.2*   .3* 109.3* 108.6*   .0 221.0 150.0   INR 1.23*  --   --        Recent Labs   Lab 24  1451 24  0523 24  0550   * 92 131*   BUN 7* 5* 3*   CREATSERUM 0.59 0.47* 0.69   CA 8.7 8.1* 8.1*   ALB 1.9*  --   --     140 138   K 5.6* 4.1 4.0    108 107   CO2 26.0 27.0 27.0   ALKPHO 246*  --   --    *  --   --    ALT 39  --   --    BILT 2.7*  --   --    TP 5.8*  --   --        Estimated  Creatinine Clearance: 78.5 mL/min (based on SCr of 0.69 mg/dL).    Recent Labs   Lab 02/18/24  1451   TROPHS <3       Recent Labs   Lab 02/18/24  1450   PTP 15.6*   INR 1.23*                  Microbiology    Hospital Encounter on 02/18/24   1. Body Fluid Cult Aerobic and Anaerobic     Status: None (Preliminary result)    Collection Time: 02/18/24  8:11 PM    Specimen: Abdominal fluid; Body fluid, unspecified   Result Value Ref Range    Body Fluid Culture Result No Growth 2 Days N/A    Body Fluid Smear No WBCs seen N/A    Body Fluid Smear No organisms seen N/A   2. Blood Culture     Status: None (Preliminary result)    Collection Time: 02/18/24  5:12 PM    Specimen: Blood,peripheral   Result Value Ref Range    Blood Culture Result No Growth 2 Days N/A   3. Urine Culture, Routine     Status: Abnormal    Collection Time: 02/18/24  2:50 PM    Specimen: Urine, clean catch   Result Value Ref Range    Urine Culture >100,000 CFU/ML Escherichia coli (A) N/A       Susceptibility    Escherichia coli -  (no method available)     Ampicillin 8 Sensitive      Cefazolin <=4 Sensitive      Ciprofloxacin <=0.25 Sensitive      Gentamicin <=1 Sensitive      Meropenem <=0.25 Sensitive      Levofloxacin <=0.12 Sensitive      Nitrofurantoin <=16 Sensitive      Piperacillin + Tazobactam <=4 Sensitive      Trimethoprim/Sulfa <=20 Sensitive          Imaging: Reviewed in Epic.    Medications:    furosemide  10 mg Oral Daily    folic acid  1 mg Oral Daily    midodrine  10 mg Oral TID    pantoprazole  40 mg Oral QAM AC    thiamine  100 mg Oral BID    pantoprazole  40 mg Intravenous Q12H    cefTRIAXone  2 g Intravenous Q24H       Assessment & Plan:      #Acute decompensated Etoh cirrhosis with recurrent ascites and abdominal pain and hypotension and hypoalbuminemia  -On empiric abx;  studies negative for SBP, s/p paracentesis x 2, seen by GI who advised outpatient follow-up with hepatology at transplant cent[facility]  Low sodium diet  Avoid  NSADs  Protonix  Miralax  Albumin infusion for hypotension and recurrent ascites and hypoalbuminemia and edema  On midodrine for hypotension.  Home Lasix was on hold in hospital for hypotension, will restart at a lower dose after albumin infusion today with plans to hold if systolic blood pressure less than 90     #mild hyperkalemia on admission  -s/p medical trx for hyperK in ER, hyperkalemia resolved     #alcoholic hepatitis with ascites-last etoh drink 5 months ago     #recent flu influenza H1N1 on 2/4/2024; finished tamiflu.     # Macrocytic anemia of chronic disease due to liver disease and history of alcohol use  -monitor CBC  -On chart review patient had vitamin B12 more than 2000 on 6/14/2023  -Continue folic acid and thiamine supplements    Discharge planning for later today versus tomorrow based on patient's blood pressure and repeat ultrasound for possible repeat paracentesis ordered by GI and GI clearance.  Follow-up with GI as outpatient as directed.  Follow-up with regular outpatient primary care physician Karoline Burks DO within 1 week in office    Luz Maria Albert MD  2/21/2024      Supplementary Documentation:     Quality:  DVT Mechanical Prophylaxis:   SCDs,    DVT Pharmacologic Prophylaxis   Medication   None                Code Status: Prior  Larose: No urinary catheter in place  Larose Duration (in days):   Central line:    MICKY: 2/21/2024    Discharge is dependent on: progress  At this point Ms. Wall is expected to be discharge to: home    The 21st Century Cures Act makes medical notes like these available to patients in the interest of transparency. Please be advised this is a medical document. Medical documents are intended to carry relevant information, facts as evident, and the clinical opinion of the practitioner. The medical note is intended as peer to peer communication and may appear blunt or direct. It is written in medical language and may contain abbreviations or verbiage that are  unfamiliar.

## 2024-02-22 NOTE — PLAN OF CARE
Patient is alert and oriented. On room air. Tele with NSR. Non-productive cough noted. Abdomen is distended. Patient states passing gas. Patient had BM yesterday. Patient c/o abdominal, back, legs, and perineal pain. Norco given. Saline locked. Gauze and tegaderm located RLQ-c/d/I. Saline locked. On sodium restricted diet. Call light within reach.

## 2024-02-22 NOTE — PROGRESS NOTES
NURSING DISCHARGE NOTE    Discharged Home via Wheelchair.  Accompanied by Support staff  Belongings Taken by patient/family.    IV taken out. Discharge instructions given to patient. Patient verbalized understanding. Patient left via wheelchair in [facility] in stable condtion. Prescriptions sent to patient's pharmacy.

## 2024-02-22 NOTE — PROGRESS NOTES
Notified Dr. Albert that the patient's temperature is 99.8 and her blood pressure is 88/58. Blood pressure taken during Albumin infusion. Dr. Albert stated to keep the patient in the hospital.

## 2024-02-22 NOTE — PHYSICAL THERAPY NOTE
PHYSICAL THERAPY TREATMENT NOTE - INPATIENT    Room Number: 314/314-A     Session: 2     Number of Visits to Meet Established Goals: 5  History related to current admission: Patient is a 43 year old female admitted on 2/18/2024 : Presented with \" several days of worsening diffuse abdominal pain and distention\", recent admit 2/1-2/8/24 for ETOH hepatitis and flu (not seen by therapy) s/p para 2/19 for ascites per EMR \"needs to be evaluated at a transplant center\"    Presenting Problem: abdominal dominik n  Co-Morbidities : alcohol abuse    ASSESSMENT   Patient demonstrates good  progress this session, goals  progressing with 2/1 met this session. Able to ambulate with CGA (continues to demonstrate balance deficits and also decline use of RW) and able to navigate stairs with CGA.     Patient continues to function below baseline with gait and stair negotiation.  Contributing factors to remaining limitations include pain and impaired dynamic balance.  Next session anticipate patient to progress gait and stair negotiation.  Physical Therapy will continue to follow patient for duration of hospitalization.    Patient continues to benefit from continued skilled PT services: at discharge to promote functional independence in home.  Anticipate patient will return home with OP PT. Pt agreeable.     PLAN  PT Treatment Plan: Bed mobility;Body mechanics;Coordination;Endurance;Energy conservation;Patient education;Family education;Gait training;Neuromuscular re-educate;Range of motion;Strengthening;Stoop training;Stair training;Transfer training;Balance training  Rehab Potential : Good  Frequency (Obs): 3-5x/week    CURRENT GOALS     Goal #1 Patient is able to demonstrate supine - sit EOB @ level: independent   MET 2/22   Goal #2 Patient is able to demonstrate transfers EOB to/from Chair/Wheelchair at assistance level: supervision   MET 2/22   Goal #3 Patient is able to ambulate 150 feet with assist device:  LRAD  at assistance  level: supervision      Goal #4 Patient is able to navigate stairs with rail and SBA   Goal #5     Goal #6     Goal Comments: Goals established on 2/20/2024 2/22/2024 all goals ongoing    SUBJECTIVE  \" I did this now I have to live with the consequences\"     OBJECTIVE  Precautions: Bed/chair alarm    WEIGHT BEARING RESTRICTION  Weight Bearing Restriction: None                PAIN ASSESSMENT   Rating: Unable to rate  Location: abdomen  Management Techniques: Body mechanics;Activity promotion;Breathing techniques;Repositioning    BALANCE                                                                                                                       Static Sitting: Good  Dynamic Sitting: Good           Static Standing: Fair -  Dynamic Standing: Fair -    ACTIVITY TOLERANCE           BP: 91/62  BP Location: Left arm  BP Method: Automatic  Patient Position: Sitting    O2 WALK         AM-PAC '6-Clicks' INPATIENT SHORT FORM - BASIC MOBILITY  How much difficulty does the patient currently have...  Patient Difficulty: Turning over in bed (including adjusting bedclothes, sheets and blankets)?: None   Patient Difficulty: Sitting down on and standing up from a chair with arms (e.g., wheelchair, bedside commode, etc.): None   Patient Difficulty: Moving from lying on back to sitting on the side of the bed?: None   How much help from another person does the patient currently need...   Help from Another: Moving to and from a bed to a chair (including a wheelchair)?: None   Help from Another: Need to walk in hospital room?: A Little   Help from Another: Climbing 3-5 steps with a railing?: A Little       AM-PAC Score:  Raw Score: 22   Approx Degree of Impairment: 20.91%   Standardized Score (AM-PAC Scale): 53.28   CMS Modifier (G-Code): CJ    FUNCTIONAL ABILITY STATUS  Gait Assessment   Functional Mobility/Gait Assessment  Gait Assistance: Contact guard assist  Distance (ft): 150  Assistive Device: None  Pattern:  Shuffle  Stairs: Stairs  How Many Stairs: 3  Device: 2 Rails  Assist: Contact guard assist  Pattern: Ascend and Descend  Ascend and Descend : Step to    Skilled Therapy Provided    Bed Mobility:  Rolling: indep   Supine<>Sit: indep   Sit<>Supine: indep     Transfer Mobility:  Sit<>Stand: CGA   Stand<>Sit: CGA   Gait: CGA    Therapist's Comments: pt reports dizziness continues to persist despite improvements in BP, educated on strategies during gait including maintaining focal point, avoiding head turns while walking, maintaining single UE support on wall while walking as pt cont to decline gait training with RW. Discussed EC and pacing techniques with pt applying slower gait speed and multiple standing rest breaks throughout session. Educated on techniques for stair training including use of B rails vs side stepping with pt attempting and successfully performing both techniques with CGA.         Patient End of Session: In bed;Needs met;Call light within reach;RN aware of session/findings    PT Session Time: 25 minutes  Gait Trainin minutes  Therapeutic Activity:  minutes  Therapeutic Exercise:  minutes   Neuromuscular Re-education:  minutes

## 2024-02-22 NOTE — IMAGING NOTE
Received call from IR stating Dr Lee read imaging and stated no fluid/ascites to be drained. I alerted Abhilash HOLGUIN of this after not being able to reach Treasure Andrews by phone. He stated he has already been made aware of this.

## 2024-02-23 ENCOUNTER — PATIENT OUTREACH (OUTPATIENT)
Dept: CASE MANAGEMENT | Age: 44
End: 2024-02-23

## 2024-02-23 NOTE — DISCHARGE SUMMARY
[facility]     Tamy Wall Patient Status:  Inpatient    1980 MRN [id number]   Location [facility] 3NW-A Attending No att. 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  Also recently hospitalized for etoh hepatitis and flu s/p tamiflu trx.  Has had chills and tactile fevers at home last few days intermittently.  Last etoh drink 5 months ago.    Brief Synopsis:   Patient presented with abdominal pain worsening edema.  Found to have ascites requiring paracentesis.  Patient remains with abdominal bloating pain.  No growth in abdominal fluid.  Found to have UTI E. coli treated with Rocephin will go home on Keflex.  Patient denies alcohol use for 5 months but has elevated phosphatidlethanol level from earlier in February.  Needs total abstinence of alcohol discussed with patient.  Patient had drainage from paracentesis site glue applied no need for stitch.  Was seen by [facility] will need follow-up with them likely will need repeat paracentesis soon due to recurrence of ascites.  Ultrasound posted showed not enough to drain at this time patient is still distended complaints of pain.  Patient received albumin Lasix has low albumin level.  Was also hypotensive some dizziness.  Blood pressure is  improved after albumin Lasix with good response will resume low-dose diuretic.  Prescriptions were sent to [facility].  Patient has been cleared for discharge needs close follow-up total abstinence of alcohol use  .  Lace+ Score: 70  59-90 High Risk  29-58 Medium Risk  0-28   Low Risk  Patient was referred to the [facility].    TCM Follow-Up Recommendation:  LACE > 58: High Risk of readmission after discharge from the hospital.      Procedures during hospitalization:   Paracentesis x 2 3 L removed 2/19  Ultrasound post paracentesis showed only small amount of ascites not enough to drain    Incidental or significant findings and recommendations (brief descriptions):  [facility] hepatology physician referrals are being sent out per [facility]  Needs follow-up with PCP  Yuli for pain  Keflex 1 capsule 3 times a day for 5 days for UTI  Resume Lasix 10 mg daily monitor blood pressure  Glue was applied to paracentesis site to stop oozing  No fluid organisms seen blood cultures negative  Urine culture was positive for E. coli    Lab/Test results pending at Discharge:   None    Consultants:  GI social work PT OT    Discharge Medication List:     Discharge Medications        START taking these medications        Instructions Prescription details   benzonatate 200 MG Caps  Commonly known as: Tessalon      Take 1 capsule (200 mg total) by mouth 3 (three) times daily as needed for cough.   Quantity: 15 capsule  Refills: 0     cephalexin 500 MG Caps  Commonly known as: Keflex      Take 1 capsule (500 mg total) by mouth 3 (three) times daily for 5 days.   Stop taking on: February 25, 2024  Quantity: 15 capsule  Refills: 0     HYDROcodone-acetaminophen 5-325 MG Tabs  Commonly known as: Norco  Notes to patient: Do not take with other acetaminophen (Tylenol) products. 4,000mg limit of tylenol daily.   Do not take with alcohol.   Do not drive while on prescription pain  medication.   Take with some food.       Take 1 tablet by mouth every 6 (six) hours as needed.   Quantity: 24 tablet  Refills: 0     Polyethylene Glycol 3350 17 g Pack  Commonly known as: MIRALAX      Take 17 g by mouth daily.   Quantity: 30 packet  Refills: 0            CHANGE how you take these medications        Instructions Prescription details   furosemide 20 MG Tabs  Commonly known as: Lasix  What changed:   how much to take  additional instructions      Take 0.5 tablets (10 mg total) by mouth daily.   Quantity: 30 tablet  Refills: 0            CONTINUE taking these medications        Instructions Prescription details   acetaminophen 325 MG Tabs  Commonly known as: Tylenol      Take 1 tablet (325 mg total) by mouth every 6 (six) hours as needed for Pain.   Refills: 0     Cholecalciferol 125 MCG (5000 UT) Tabs  Commonly known as: VITAMIN D-3      Take 1 tablet (5,000 Units total) by mouth daily.   Refills: 0     folic acid 1 MG Tabs  Commonly known as: Folvite      Take 1 tablet (1 mg total) by mouth daily.   Quantity: 30 tablet  Refills: 2     midodrine 10 MG Tabs  Commonly known as: ProAmatine      Take 1 tablet (10 mg total) by mouth in the morning and 1 tablet (10 mg total) at noon and 1 tablet (10 mg total) in the evening.   Quantity: 90 tablet  Refills: 1     pantoprazole 40 MG Tbec  Commonly known as: Protonix      Take 1 tablet (40 mg total) by mouth every morning before breakfast.   Quantity: 90 tablet  Refills: 1     thiamine 100 MG Tabs  Commonly known as: Vitamin B1      Take 1 tablet (100 mg total) by mouth in the morning and 1 tablet (100 mg total) before bedtime.   Quantity: 60 tablet  Refills: 0            STOP taking these medications      ibuprofen 400 MG Tabs  Commonly known as: Motrin                  Where to Get Your Medications        These medications were sent to [id number] [address] [facility] [address], [phone], [phone] [address], [facility] [address]      Phone: [phone]   benzonatate 200 MG Caps  cephalexin 500 MG Caps  furosemide 20 MG Tabs  HYDROcodone-acetaminophen 5-325 MG Tabs       Please  your prescriptions at the location directed by your doctor or nurse    Bring a paper prescription for each of these medications  Polyethylene Glycol 3350 17 g Pack         [place] reviewed: Reviewed    Follow-up appointment:   Karoline Burks DO  [address] [place] [address]  [address]  [facility] [address] [phone]    Follow up      [facility]  [address]  [facility] [address] [phone]  Follow up      Familia Perez MD  [address]  [facility] [address] [phone]    Follow up  new phone # [phone]  not the ones above  at [facility]    Halley Valdez DO  [address]  [facility] [address] [phone]    Go in 1 week(s)  for a follow-up visit with GI. The office will call you to arrange the date/time of your office visit. They will also call with the status of your referral    Appointments for Next 30 Days 2/22/2024 - 3/23/2024      None            Vital signs:  Temp:  [98.2 °F (36.8 °C)-99.2 °F (37.3 °C)] 99 °F (37.2 °C)  Pulse:  [72-85] 72  Resp:  [18] 18  BP: (91-98)/(56-64) 92/56  SpO2:  [97 %-99 %] 97 %    Physical Exam:    General: No acute distress awake alert  Lungs: clear to auscultation, nonproductive cough  Cardiovascular: S1, S2  Abdomen: Soft, distended positive bowel sounds have bowel movement  No lower extremity edema    -----------------------------------------------------------------------------------------------  PATIENT DISCHARGE INSTRUCTIONS: See electronic chart    Treasure Andrews NP    Addendum:    Agree with above note by YUDELKA Amanda. Patient seen and examined independently, please refer to the progress note done by me on the date of discharge for details regarding the same, agree with above except as otherwise noted.  Chief  Complaint: Abdominal pain   Subjective:Blood pressure remains stable overnight, received low-dose of Lasix this morning.  Abdominal wall discomfort/pain remains the same according to patient.  Had limited abdominal ultrasound yesterday evening which only showed trace ascites not enough to be tapped, okay for discharge by GI     Objective:  BP 92/56 (BP Location: Left arm)   Pulse 72   Temp 99 °F (37.2 °C) (Oral)   Resp 18   Ht 61\"   Wt 119 lb 3.2 oz (54.1 kg)   LMP 01/01/2024 (Approximate)   SpO2 97%   BMI 22.52 kg/m²     General: No acute distress  Respiratory: Clear to auscultation bilateral, no wheezes, no rhonchi  Cardiovascular: S1, S2, regular rate and rhythm  Abdomen: Soft, mild distended, tenderness in right sided abdomen and in the right flank, positive bowel sounds.  Right lower quadrant paracentesis site with no drainage at this time  Neuro: Awake alert, no new focal deficits.   Extremities: Trace pedal edema    A/P :  #Acute decompensated Etoh cirrhosis with recurrent ascites and abdominal pain and hypotension and hypoalbuminemia  -Treated on empiric abx with IV Rocephin;  studies negative for SBP, s/p paracentesis x 2, seen by GI who advised outpatient follow-up with hepatology at transplant centeR  Low sodium diet  Avoid NSADs  Protonix  Miralax  Albumin infusion for hypotension and recurrent ascites and hypoalbuminemia and edema  On midodrine for hypotension.  Home Lasix was on hold in hospital for hypotension, which was restarted at a lower dose after albumin infusion  with plans to hold if systolic blood pressure less than 90-tolerating so far.     # E. coli UTI present on admission  -Urine culture sent on admission came back with E. coli more than 100,000 sensitive to all antibiotics  -Was on IV Rocephin daily since admission, patient received 4 doses of this in the hospital.  Will continue oral antibiotics Keflex at the time of discharge to complete full course of antibiotics     #mild  hyperkalemia on admission  -s/p medical trx for hyperK in ER, hyperkalemia resolved     #alcoholic hepatitis with ascites-last etoh drink 5 months ago     #recent flu influenza H1N1 on 2024; finished tamiflu.  On Tessalon pearls as needed for cough     # Macrocytic anemia of chronic disease due to liver disease and history of alcohol use  -monitor CBC  -On chart review patient had vitamin B12 more than 2000 on 2023  -Continue folic acid and thiamine supplements     Discharge planning for today, okayed by GI for discharge.  Follow-up with GI as outpatient as directed.  Follow-up with regular outpatient primary care physician Karoline Burks DO within 1 week in office      Pt seen and examined independently. Chart reviewed.   Labs and imaging over the last 24 hours have been personally reviewed.  I personally made/approved 100% of the management plan and take full responsibility for the patient management for this discharge summary.   Note has been reviewed by me and modified as needed.  Exam and Impression/ Recs as noted above.  D/w staff and with patient.       Luz Maria Albert MD  2024        Total time spent on discharge plannin minutes spent by me for discharge planning    The 21st Century Cures Act makes medical notes like these available to patients in the interest of transparency. Please be advised this is a medical document. Medical documents are intended to carry relevant information, facts as evident, and the clinical opinion of the practitioner. The medical note is intended as peer to peer communication and may appear blunt or direct. It is written in medical language and may contain abbreviations or verbiage that are unfamiliar.

## 2024-02-24 ENCOUNTER — TELEPHONE (OUTPATIENT)
Dept: SURGERY | Facility: HOSPITAL | Age: 44
End: 2024-02-24

## 2024-02-26 ENCOUNTER — PATIENT OUTREACH (OUTPATIENT)
Dept: CASE MANAGEMENT | Age: 44
End: 2024-02-26

## 2024-02-29 ENCOUNTER — HOSPITAL ENCOUNTER (INPATIENT)
Facility: HOSPITAL | Age: 44
LOS: 1 days | Discharge: HOME | End: 2024-03-04
Attending: EMERGENCY MEDICINE | Admitting: STUDENT IN AN ORGANIZED HEALTH CARE EDUCATION/TRAINING PROGRAM
Payer: MEDICAID

## 2024-02-29 ENCOUNTER — OFFICE VISIT (OUTPATIENT)
Dept: INTERNAL MEDICINE CLINIC | Facility: CLINIC | Age: 44
End: 2024-02-29
Payer: MEDICAID

## 2024-02-29 ENCOUNTER — LAB ENCOUNTER (OUTPATIENT)
Dept: LAB | Age: 44
End: 2024-02-29
Attending: STUDENT IN AN ORGANIZED HEALTH CARE EDUCATION/TRAINING PROGRAM
Payer: MEDICAID

## 2024-02-29 VITALS
WEIGHT: 106 LBS | HEIGHT: 61 IN | BODY MASS INDEX: 20.01 KG/M2 | SYSTOLIC BLOOD PRESSURE: 98 MMHG | HEART RATE: 94 BPM | DIASTOLIC BLOOD PRESSURE: 60 MMHG | TEMPERATURE: 98 F | RESPIRATION RATE: 20 BRPM | OXYGEN SATURATION: 99 %

## 2024-02-29 DIAGNOSIS — F10.10 ALCOHOL ABUSE: ICD-10-CM

## 2024-02-29 DIAGNOSIS — K70.11 ALCOHOLIC HEPATITIS WITH ASCITES (HCC): ICD-10-CM

## 2024-02-29 DIAGNOSIS — R18.8 OTHER ASCITES: ICD-10-CM

## 2024-02-29 DIAGNOSIS — K65.2 SBP (SPONTANEOUS BACTERIAL PERITONITIS) (HCC): Primary | ICD-10-CM

## 2024-02-29 DIAGNOSIS — E80.6 HYPERBILIRUBINEMIA: ICD-10-CM

## 2024-02-29 DIAGNOSIS — M54.9 BILATERAL BACK PAIN, UNSPECIFIED BACK LOCATION, UNSPECIFIED CHRONICITY: ICD-10-CM

## 2024-02-29 DIAGNOSIS — R79.89 ABNORMAL LFTS: ICD-10-CM

## 2024-02-29 DIAGNOSIS — D64.9 ANEMIA, UNSPECIFIED TYPE: ICD-10-CM

## 2024-02-29 DIAGNOSIS — E86.1 HYPOTENSION DUE TO HYPOVOLEMIA: ICD-10-CM

## 2024-02-29 DIAGNOSIS — K21.9 GASTROESOPHAGEAL REFLUX DISEASE, UNSPECIFIED WHETHER ESOPHAGITIS PRESENT: ICD-10-CM

## 2024-02-29 DIAGNOSIS — G62.1 ALCOHOLIC PERIPHERAL NEUROPATHY (HCC): ICD-10-CM

## 2024-02-29 DIAGNOSIS — R10.9 ABDOMINAL PAIN OF UNKNOWN ETIOLOGY: ICD-10-CM

## 2024-02-29 DIAGNOSIS — Z09 HOSPITAL DISCHARGE FOLLOW-UP: Primary | ICD-10-CM

## 2024-02-29 DIAGNOSIS — M62.81 MUSCLE WEAKNESS (GENERALIZED): ICD-10-CM

## 2024-02-29 DIAGNOSIS — L29.9 ITCHING: ICD-10-CM

## 2024-02-29 PROBLEM — R76.12 POSITIVE QUANTIFERON-TB GOLD TEST: Status: ACTIVE | Noted: 2018-05-24

## 2024-02-29 LAB
ALBUMIN SERPL-MCNC: 2.6 G/DL (ref 3.4–5)
ALBUMIN SERPL-MCNC: 2.8 G/DL (ref 3.4–5)
ALBUMIN/GLOB SERPL: 0.6 {RATIO} (ref 1–2)
ALBUMIN/GLOB SERPL: 0.7 {RATIO} (ref 1–2)
ALP LIVER SERPL-CCNC: 209 U/L
ALP LIVER SERPL-CCNC: 210 U/L
ALT SERPL-CCNC: 36 U/L
ALT SERPL-CCNC: 39 U/L
ANION GAP SERPL CALC-SCNC: 5 MMOL/L (ref 0–18)
ANION GAP SERPL CALC-SCNC: 9 MMOL/L (ref 0–18)
AST SERPL-CCNC: 143 U/L (ref 15–37)
AST SERPL-CCNC: 151 U/L (ref 15–37)
B-HCG UR QL: NEGATIVE
BASOPHILS # BLD AUTO: 0.07 X10(3) UL (ref 0–0.2)
BASOPHILS # BLD AUTO: 0.07 X10(3) UL (ref 0–0.2)
BASOPHILS NFR BLD AUTO: 0.5 %
BASOPHILS NFR BLD AUTO: 0.6 %
BILIRUB DIRECT SERPL-MCNC: 1.3 MG/DL (ref 0–0.2)
BILIRUB SERPL-MCNC: 1.7 MG/DL (ref 0.1–2)
BILIRUB SERPL-MCNC: 1.8 MG/DL (ref 0.1–2)
BILIRUB UR QL STRIP.AUTO: NEGATIVE
BUN BLD-MCNC: 6 MG/DL (ref 9–23)
BUN BLD-MCNC: 7 MG/DL (ref 9–23)
CALCIUM BLD-MCNC: 8.7 MG/DL (ref 8.5–10.1)
CALCIUM BLD-MCNC: 9 MG/DL (ref 8.5–10.1)
CHLORIDE SERPL-SCNC: 106 MMOL/L (ref 98–112)
CHLORIDE SERPL-SCNC: 108 MMOL/L (ref 98–112)
CO2 SERPL-SCNC: 25 MMOL/L (ref 21–32)
CO2 SERPL-SCNC: 27 MMOL/L (ref 21–32)
COLOR UR AUTO: YELLOW
CREAT BLD-MCNC: 0.54 MG/DL
CREAT BLD-MCNC: 0.64 MG/DL
DEPRECATED HBV CORE AB SER IA-ACNC: 49.3 NG/ML
EGFRCR SERPLBLD CKD-EPI 2021: 112 ML/MIN/1.73M2 (ref 60–?)
EGFRCR SERPLBLD CKD-EPI 2021: 116 ML/MIN/1.73M2 (ref 60–?)
EOSINOPHIL # BLD AUTO: 0.1 X10(3) UL (ref 0–0.7)
EOSINOPHIL # BLD AUTO: 0.13 X10(3) UL (ref 0–0.7)
EOSINOPHIL NFR BLD AUTO: 0.8 %
EOSINOPHIL NFR BLD AUTO: 0.9 %
ERYTHROCYTE [DISTWIDTH] IN BLOOD BY AUTOMATED COUNT: 13 %
ERYTHROCYTE [DISTWIDTH] IN BLOOD BY AUTOMATED COUNT: 13 %
FASTING STATUS PATIENT QL REPORTED: NO
GLOBULIN PLAS-MCNC: 4.2 G/DL (ref 2.8–4.4)
GLOBULIN PLAS-MCNC: 4.5 G/DL (ref 2.8–4.4)
GLUCOSE BLD-MCNC: 73 MG/DL (ref 70–99)
GLUCOSE BLD-MCNC: 87 MG/DL (ref 70–99)
GLUCOSE UR STRIP.AUTO-MCNC: NORMAL MG/DL
HCT VFR BLD AUTO: 29.3 %
HCT VFR BLD AUTO: 31.9 %
HGB BLD-MCNC: 9.7 G/DL
HGB BLD-MCNC: 9.8 G/DL
IMM GRANULOCYTES # BLD AUTO: 0.05 X10(3) UL (ref 0–1)
IMM GRANULOCYTES # BLD AUTO: 0.05 X10(3) UL (ref 0–1)
IMM GRANULOCYTES NFR BLD: 0.3 %
IMM GRANULOCYTES NFR BLD: 0.4 %
INR BLD: 1.23 (ref 0.8–1.2)
IRON SATN MFR SERPL: 8 %
IRON SERPL-MCNC: 22 UG/DL
KETONES UR STRIP.AUTO-MCNC: NEGATIVE MG/DL
LEUKOCYTE ESTERASE UR QL STRIP.AUTO: 75
LYMPHOCYTES # BLD AUTO: 2.66 X10(3) UL (ref 1–4)
LYMPHOCYTES # BLD AUTO: 3.09 X10(3) UL (ref 1–4)
LYMPHOCYTES NFR BLD AUTO: 21.1 %
LYMPHOCYTES NFR BLD AUTO: 21.5 %
MAGNESIUM SERPL-MCNC: 1.7 MG/DL (ref 1.6–2.6)
MCH RBC QN AUTO: 33.1 PG (ref 26–34)
MCH RBC QN AUTO: 34.5 PG (ref 26–34)
MCHC RBC AUTO-ENTMCNC: 30.7 G/DL (ref 31–37)
MCHC RBC AUTO-ENTMCNC: 33.1 G/DL (ref 31–37)
MCV RBC AUTO: 104.3 FL
MCV RBC AUTO: 107.8 FL
MONOCYTES # BLD AUTO: 0.78 X10(3) UL (ref 0.1–1)
MONOCYTES # BLD AUTO: 0.96 X10(3) UL (ref 0.1–1)
MONOCYTES NFR BLD AUTO: 6.2 %
MONOCYTES NFR BLD AUTO: 6.7 %
NEUTROPHILS # BLD AUTO: 10.05 X10 (3) UL (ref 1.5–7.7)
NEUTROPHILS # BLD AUTO: 10.05 X10(3) UL (ref 1.5–7.7)
NEUTROPHILS # BLD AUTO: 8.93 X10 (3) UL (ref 1.5–7.7)
NEUTROPHILS # BLD AUTO: 8.93 X10(3) UL (ref 1.5–7.7)
NEUTROPHILS NFR BLD AUTO: 70.1 %
NEUTROPHILS NFR BLD AUTO: 70.9 %
NITRITE UR QL STRIP.AUTO: NEGATIVE
OSMOLALITY SERPL CALC.SUM OF ELEC: 287 MOSM/KG (ref 275–295)
OSMOLALITY SERPL CALC.SUM OF ELEC: 287 MOSM/KG (ref 275–295)
PH UR STRIP.AUTO: 7.5 [PH] (ref 5–8)
PHOSPHATE SERPL-MCNC: 2.6 MG/DL (ref 2.5–4.9)
PLATELET # BLD AUTO: 281 10(3)UL (ref 150–450)
PLATELET # BLD AUTO: 293 10(3)UL (ref 150–450)
POTASSIUM SERPL-SCNC: 3.9 MMOL/L (ref 3.5–5.1)
POTASSIUM SERPL-SCNC: 4.5 MMOL/L (ref 3.5–5.1)
PROT SERPL-MCNC: 7 G/DL (ref 6.4–8.2)
PROT SERPL-MCNC: 7.1 G/DL (ref 6.4–8.2)
PROT UR STRIP.AUTO-MCNC: NEGATIVE MG/DL
PROTHROMBIN TIME: 15.6 SECONDS (ref 11.6–14.8)
RBC # BLD AUTO: 2.81 X10(6)UL
RBC # BLD AUTO: 2.96 X10(6)UL
RBC UR QL AUTO: NEGATIVE
SODIUM SERPL-SCNC: 140 MMOL/L (ref 136–145)
SODIUM SERPL-SCNC: 140 MMOL/L (ref 136–145)
SP GR UR STRIP.AUTO: 1.02 (ref 1–1.03)
TIBC SERPL-MCNC: 262 UG/DL (ref 240–450)
TRANSFERRIN SERPL-MCNC: 176 MG/DL (ref 200–360)
UROBILINOGEN UR STRIP.AUTO-MCNC: NORMAL MG/DL
WBC # BLD AUTO: 12.6 X10(3) UL (ref 4–11)
WBC # BLD AUTO: 14.4 X10(3) UL (ref 4–11)

## 2024-02-29 PROCEDURE — 99215 OFFICE O/P EST HI 40 MIN: CPT | Performed by: STUDENT IN AN ORGANIZED HEALTH CARE EDUCATION/TRAINING PROGRAM

## 2024-02-29 PROCEDURE — 82607 VITAMIN B-12: CPT

## 2024-02-29 PROCEDURE — 82248 BILIRUBIN DIRECT: CPT

## 2024-02-29 PROCEDURE — 85025 COMPLETE CBC W/AUTO DIFF WBC: CPT

## 2024-02-29 PROCEDURE — 83540 ASSAY OF IRON: CPT

## 2024-02-29 PROCEDURE — 82728 ASSAY OF FERRITIN: CPT

## 2024-02-29 PROCEDURE — 83550 IRON BINDING TEST: CPT

## 2024-02-29 PROCEDURE — 36415 COLL VENOUS BLD VENIPUNCTURE: CPT

## 2024-02-29 PROCEDURE — 85610 PROTHROMBIN TIME: CPT

## 2024-02-29 PROCEDURE — 83735 ASSAY OF MAGNESIUM: CPT

## 2024-02-29 PROCEDURE — 80053 COMPREHEN METABOLIC PANEL: CPT

## 2024-02-29 PROCEDURE — 84100 ASSAY OF PHOSPHORUS: CPT

## 2024-02-29 RX ORDER — MIDODRINE HYDROCHLORIDE 10 MG/1
10 TABLET ORAL 3 TIMES DAILY
Qty: 90 TABLET | Refills: 1 | Status: SHIPPED | OUTPATIENT
Start: 2024-02-29

## 2024-02-29 RX ORDER — PANTOPRAZOLE SODIUM 40 MG/1
40 TABLET, DELAYED RELEASE ORAL
Qty: 90 TABLET | Refills: 1 | Status: SHIPPED | OUTPATIENT
Start: 2024-02-29

## 2024-02-29 RX ORDER — FOLIC ACID 1 MG/1
1 TABLET ORAL DAILY
Qty: 30 TABLET | Refills: 2 | Status: SHIPPED | OUTPATIENT
Start: 2024-02-29

## 2024-02-29 RX ORDER — DIPHENHYDRAMINE HCL 25 MG
25 CAPSULE ORAL EVERY 6 HOURS PRN
Qty: 60 CAPSULE | Refills: 1 | Status: SHIPPED | OUTPATIENT
Start: 2024-02-29

## 2024-02-29 NOTE — PROGRESS NOTES
OFFICE NOTE     Patient ID: Tamy Wall is a 43 year old female.  Today's Date: 02/29/24  Chief Complaint: Hospital F/U (HFU 2/18/24; Dx:ascites and abdominal pain/Still having pain in the Abd area and in both legs )    Patient is a 43-year-old female with PMH of appendectomy, migraine headaches, fibroids, alcohol use disorder (was drinking 3-5 glasses of wine daily), hepatic steatosis and alcoholic liver disease, with recurrent ascites, hypotension and hypoalbuminemia, who presents to the office today for hospital admission follow-up.    She was admitted to the hospital From 2/18/2024 to 2/22/2024.  Patient presented to the hospital with increased abdominal pain, distention, SOB and worsening edema in her bilateral lower extremities. Found to have increasing abdominal ascites.    CTA 2/18/2024: CONCLUSION:    1. Increasing abdominal ascites in comparison to 2/1/2024.   2. Hyperenhancing small bowel, correlate for findings of enteritis.   3. Stable enlarged liver.   4. Bibasilar atelectasis within the lungs.  Superimposed infection/inflammation is not excluded.   5. Additional findings as detailed in the body of the report.       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 Covered with empiric abx. Patient was then started on IV antibiotics.  Ascitic fluid was negative for SBP and then patient was evaluated in the hospital by gastroenterologist,  ultrasound-guided paracentesis was performed removing 2.9 L of fluid.  Patient's shortness of breath and abdominal pain has significantly improved after that.  IV infusions of albumin were also performed.  Lasix was on hold during the hospitalization due to persistently low blood pressure (maintained with Midodrine 10 mg TID)  She was recommended low-sodium  diet, avoid NSAIDs, continue with the Protonix and MiraLAX and follow-up with GI outpatient.  Patient was discharged on Lasix 10 mg daily (which was decreased from her prior hospitalization 2/1- 2/8 (she was placed on 20 mg daily at that time).    · E. coli UTI present on admission  Patient was found to have E. coli UTI, which was sensitive for all antibiotics, which was covered by the IV Rocephin-received 4 doses inpatient.  Patient was discharged on oral Keflex to complete full course of antibiotics. (To complete on Feb 25th, 2024)    · Mild hyperkalemia on admission resolved  Potassium was 5.6 on initial ED presentation, therefore all potassium-sparing diuretics held. She is s/p hyperkalemia treatment with repeat K+ of 4.1.     · Alcoholic hepatitis with ascites-elevated phosphatidlethanol 2/2  Pt was previously admitted from 2/1 - 2/8 with alcoholic hepatitis with paracentesis at that time of 610 mL drained. Following that admission, pt was advised to establish care with a tertiary center (referral placed to Dr. Perez at ), however she has not yet been able to schedule this.     · Flu influenza H1 N1 2/4 completed Tamiflu on prior admission    · Macrocytic anemia of chronic disease due to liver disease and history of alcohol use   Most likely related to chronic liver disease and alcohol.  Patient was recommended to monitor CBC.  She is already on vitamin B supplementation and it was over 2006/14/2023.  Patient is also on folic acid and thiamine supplementation.           Patient states that she started drinking for the first time back in 2018 slowly with maybe 1 glass of wine a day.  Then her alcohol consumption increased during COVID time, when she could drink 1 bottle of wine a day.    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    She first presented to the ER in June 2023 with a weight loss of 25 pounds and poor appetite for about 1 months and generalized abdominal pain and abnormal laboratory  workup from the outside clinic with elevated liver enzymes.On admission, liver enzymes: , , Alk phos 208, T bili 3.7 and low K+.   Abdominal US at that time showed diffuse hepatic steatosis. Layering sludge in the gallbladder. No dilation of CBD.   CT scan was significant for possible acute cholecystitis, also showed Diffuse fatty liver with mild hepatomegaly., partially calcified ovoid lesion within the right adnexa. Mild bilateral pleural effusion.   HIDA scan shoved visualization of gallbladder after morphine administration.  Endoscopy was performed on 6/20/2023 and showed normal appearing esophagus with the GE junction at 35 cm with mild gastritis.  Colonoscopy was performed on 6/20/2023, and was mostly normal, 1 polyp was removed from the cecum 5 mm.  Recommended follow-up was 7 years because pathology showed tubular adenoma.      She was then admitted to the hospital again on 02/01- 02/08/2024, with     No IV drug use, No use of supplements , No NSAID use, No Tylenol use.    Vitals:    02/29/24 1105   BP: 98/60   Pulse: 94   Resp: 20   Temp: 98.4 °F (36.9 °C)   SpO2: 99%   Weight: 106 lb (48.1 kg)   Height: 5' 1\" (1.549 m)     body mass index is 20.03 kg/m².  BP Readings from Last 3 Encounters:   03/04/24 94/62   02/29/24 98/60   02/22/24 92/56     The 10-year ASCVD risk score (Giovany SHEA, et al., 2019) is: 2.4%    Values used to calculate the score:      Age: 43 years      Sex: Female      Is Non- : No      Diabetic: No      Tobacco smoker: No      Systolic Blood Pressure: 98 mmHg      Is BP treated: No      HDL Cholesterol: 29 mg/dL      Total Cholesterol: 289 mg/dL      Medications reviewed:  Current Outpatient Medications   Medication Sig Dispense Refill    midodrine 10 MG Oral Tab Take 1 tablet (10 mg total) by mouth in the morning and 1 tablet (10 mg total) at noon and 1 tablet (10 mg total) in the evening. 90 tablet 1    pantoprazole 40 MG Oral Tab EC Take 1 tablet  (40 mg total) by mouth every morning before breakfast. 90 tablet 1    folic acid 1 MG Oral Tab Take 1 tablet (1 mg total) by mouth daily. 30 tablet 2    diphenhydrAMINE (BENADRYL ALLERGY) 25 MG Oral Cap Take 1 capsule (25 mg total) by mouth every 6 (six) hours as needed for Itching (for itching). 60 capsule 1    Cholecalciferol 125 MCG (5000 UT) Oral Tab Take 1 tablet (5,000 Units total) by mouth daily.      acetaminophen 325 MG Oral Tab Take 1 tablet (325 mg total) by mouth every 6 (six) hours as needed for Pain.      thiamine 100 MG Oral Tab Take 1 tablet (100 mg total) by mouth in the morning and 1 tablet (100 mg total) before bedtime. 60 tablet 0    HYDROcodone-acetaminophen 5-325 MG Oral Tab Take 1 tablet by mouth every 8 (eight) hours as needed. 10 tablet 0    furosemide 20 MG Oral Tab Take 1 tablet (20 mg total) by mouth daily. 30 tablet 0    lactulose 10 GM/15ML Oral Solution Take 30 mL (20 g total) by mouth 3 (three) times daily. 2700 mL 0    ciprofloxacin 500 MG Oral Tab Take 1 tablet (500 mg total) by mouth daily for 2 days. 2 tablet 0         Assessment & Plan    1. Hospital discharge follow-up (Primary)  Patient is here today to follow up after hospital discharge 2/18/2024 to 2/22/2024.  She was admitted with acute decompensated alcoholic cirrhosis with recurrent ascites.  Ultrasound-guided paracentesis was performed, which yielded 2.9 L of fluid, negative for SBP.  Lasix was on hold during hospitalization due to low blood pressures.  Spironolactone was not initiated because of hyperkalemia on admission, which resolved with acute treatment in the ED.  Patient was found to have E. coli UTI, and was treated with Rocephin IV for total of 4 doses and then switch to oral Keflex and discharged home with improvement in her symptoms.    2. Alcohol abuse  Patient states that she has not been drinking alcohol for the past 5 months.  She has an appointment with a gastroenterologist.  And needs to see hepatologist  to be evaluated for possible transplant.  Patient is taking folic acid, thiamine and vitamin B12.  She is asking for refill of folic acid.  Will provide.  -     Folic Acid; Take 1 tablet (1 mg total) by mouth daily.  Dispense: 30 tablet; Refill: 2    3. Alcoholic hepatitis with ascites (HCC)  4. Abnormal LFTs  5. Hyperbilirubinemia  Patient was previously hospitalized in June and then on 2/1 - 2/8/2024 for acute alcoholic hepatitis with elevated LFTs.  Her CT scan showed diffusely enlarged liver with fatty infiltration.  At this point it is unclear if liver function will recover in future.  Patient will follow-up with the gastroenterologist and hepatologist for further evaluation and management.  Will order follow-up labs after the hospitalization to assess the trend of LFTs, bilirubin and electrolytes.  -     Comp Metabolic Panel (14); Future; Expected date: 02/29/2024  -     Bilirubin, Direct; Future; Expected date: 02/29/2024  -     Magnesium; Future; Expected date: 02/29/2024  -     Phosphorus; Future; Expected date: 02/29/2024  -     Prothrombin Time (PT); Future; Expected date: 02/29/2024  -     Vitamin B12; Future; Expected date: 02/29/2024    6. Itching  Most likely related to elevated level of bilirubin? Patient was taking Benadryl with mild- moderate relief and would like to get a refill. Advised to discuss with gastroenterologist about Ursodeoxycholic acid  -     diphenhydrAMINE HCl; Take 1 capsule (25 mg total) by mouth every 6 (six) hours as needed for Itching (for itching).  Dispense: 60 capsule; Refill: 1    7. Hypotension due to hypovolemia  Patient is taking midodrine 10 mg 3 times a day with good effect.  Her blood pressure in the office today is 98/62.  She is asymptomatic.  She states that since a very good blood pressure for her.  Will refill her midodrine.  -     Midodrine HCl; Take 1 tablet (10 mg total) by mouth in the morning and 1 tablet (10 mg total) at noon and 1 tablet (10 mg total) in  the evening.  Dispense: 90 tablet; Refill: 1    8. Gastroesophageal reflux disease, unspecified whether esophagitis present  Patient needs refill on pantoprazole.  Will follow-up with GI.  -     Pantoprazole Sodium; Take 1 tablet (40 mg total) by mouth every morning before breakfast.  Dispense: 90 tablet; Refill: 1  -     Vitamin B12; Future; Expected date: 02/29/2024    9. Alcoholic peripheral neuropathy (HCC)  -     Folic Acid; Take 1 tablet (1 mg total) by mouth daily.  Dispense: 30 tablet; Refill: 2    10. Anemia, unspecified type  Macrocytic anemia, most likely related to alcohol use and liver disease.  Vitamin B12 and folic acid levels were WNL and patient is taking supplementation.  Her ferritin level was elevated, however it trended down.  Will repeat levels.  -     CBC With Differential With Platelet; Future; Expected date: 02/29/2024  -     Iron And Tibc; Future; Expected date: 02/29/2024  -     Ferritin; Future; Expected date: 02/29/2024    11. Bilateral back pain, unspecified back location, unspecified chronicity  Patient complains of bilateral back pain for the past few months and would like to do physical therapy  -     Physical Therapy Referral - [facility]    12. Muscle weakness (generalized)  Complains of generalized muscle weakness, which is progressive.  Would like to try physical therapy.  Will order.  -     Physical Therapy Referral - [facility]        Follow Up: follow up in 1-2 months for physical exam         Objective/ Results:   Physical Exam  Constitutional:       General: She is not in acute distress.     Appearance: She is normal weight. She is ill-appearing. She is not toxic-appearing or diaphoretic.   HENT:      Head: Normocephalic.      Right Ear: Tympanic membrane, ear canal and external ear normal.      Left Ear: Tympanic membrane, ear canal and external ear normal.      Mouth/Throat:      Pharynx: No oropharyngeal exudate or posterior oropharyngeal erythema.   Eyes:       General: Scleral icterus present.      Extraocular Movements: Extraocular movements intact.      Pupils: Pupils are equal, round, and reactive to light.   Cardiovascular:      Rate and Rhythm: Normal rate and regular rhythm.      Heart sounds: Normal heart sounds. No murmur heard.     No friction rub. No gallop.   Pulmonary:      Effort: Pulmonary effort is normal. No tachypnea or accessory muscle usage.      Comments: Decreased breath sounds at the bases bilaterally.  Musculoskeletal:      Cervical back: No pain with movement. Normal range of motion.      Lumbar back: Tenderness present. Decreased range of motion.      Right lower leg: No swelling, deformity, lacerations, tenderness or bony tenderness. No edema.      Left lower leg: No swelling, deformity, lacerations, tenderness or bony tenderness. No edema.      Comments: Paraspinal muscle tenderness.  Muscle weaknes 3/5 in bilateral Lower extremities   Lymphadenopathy:      Cervical: No cervical adenopathy.   Skin:     General: Skin is warm.      Capillary Refill: Capillary refill takes less than 2 seconds.      Coloration: Skin is not jaundiced.      Findings: No bruising, lesion or rash.   Neurological:      General: No focal deficit present.      Mental Status: She is alert and oriented to person, place, and time.      Cranial Nerves: No cranial nerve deficit.      Sensory: No sensory deficit.      Motor: No weakness.      Gait: Gait normal.        Reviewed:    Patient Active Problem List    Diagnosis    Acute midline thoracic back pain    Constipation    E. coli UTI    Anemia of chronic disease    Hyperkalemia    Anemia    Leukocytosis    Hyperglycemia    Abdominal pain of unknown etiology    Alcoholic cirrhosis of liver with ascites (HCC)    Leukocytosis, unspecified type    SBP (spontaneous bacterial peritonitis) (HCC)    Other ascites    Gastroesophageal reflux disease    Hypotension due to hypovolemia    Iron deficiency anemia secondary to inadequate  dietary iron intake    Alcoholic peripheral neuropathy (HCC)    Calculus of gallbladder with chronic cholecystitis without obstruction    Gallstones    Acute cholecystitis    Abnormal LFTs    Hyperbilirubinemia    Alcohol abuse    Hypokalemia    Positive QuantiFERON-TB Gold test    Bradycardia    Severe frontal headaches    Right arm numbness    Dyshidrotic eczema    Fibroid, uterine      No Known Allergies     Social History     Socioeconomic History    Marital status:    Tobacco Use    Smoking status: Former     Packs/day: 0.10     Years: 6.00     Additional pack years: 0.00     Total pack years: 0.60     Types: Cigarettes     Quit date:      Years since quittin.1    Smokeless tobacco: Never   Vaping Use    Vaping Use: Never used   Substance and Sexual Activity    Alcohol use: No     Alcohol/week: 0.0 standard drinks of alcohol    Drug use: No     Social Determinants of Health     Food Insecurity: No Food Insecurity (3/1/2024)    Food Insecurity     Food Insecurity: Never true   Recent Concern: Food Insecurity - Food Insecurity Present (2024)    Food Insecurity     Food Insecurity: Sometimes true   Transportation Needs: Unmet Transportation Needs (3/1/2024)    Transportation Needs     Lack of Transportation: Yes   Housing Stability: Low Risk  (3/1/2024)    Housing Stability     Housing Instability: No      Review of Systems   Constitutional:  Positive for activity change, appetite change, fatigue and unexpected weight change. Negative for fever.   HENT: Negative.     Eyes:         Yellowish eyes   Respiratory: Negative.     Cardiovascular:  Negative for chest pain, palpitations and leg swelling.   Gastrointestinal:  Positive for abdominal distention, abdominal pain, constipation and nausea. Negative for anal bleeding, blood in stool, diarrhea and vomiting.   Endocrine: Negative.    Genitourinary: Negative.    Musculoskeletal:  Positive for arthralgias, back pain and myalgias.        Muscle  weakness   Skin:  Positive for color change.   Neurological: Negative.      All other systems negative unless otherwise stated in ROS or HPI above.       Bria Griffin MD  Internal Medicine       Call office with any questions or seek emergency care if necessary.   Patient understands and agrees to follow directions and advice.      ----------------------------------------- PATIENT INSTRUCTIONS-----------------------------------------     There are no Patient Instructions on file for this visit.

## 2024-03-01 ENCOUNTER — APPOINTMENT (OUTPATIENT)
Dept: ULTRASOUND IMAGING | Facility: HOSPITAL | Age: 44
End: 2024-03-01
Attending: STUDENT IN AN ORGANIZED HEALTH CARE EDUCATION/TRAINING PROGRAM
Payer: MEDICAID

## 2024-03-01 LAB
ADENOVIRUS PCR:: NOT DETECTED
B PARAPERT DNA SPEC QL NAA+PROBE: NOT DETECTED
B PERT DNA SPEC QL NAA+PROBE: NOT DETECTED
BASOPHILS # BLD AUTO: 0.06 X10(3) UL (ref 0–0.2)
BASOPHILS NFR BLD AUTO: 0.5 %
C PNEUM DNA SPEC QL NAA+PROBE: NOT DETECTED
CORONAVIRUS 229E PCR:: NOT DETECTED
CORONAVIRUS HKU1 PCR:: NOT DETECTED
CORONAVIRUS NL63 PCR:: NOT DETECTED
CORONAVIRUS OC43 PCR:: NOT DETECTED
EOSINOPHIL # BLD AUTO: 0.17 X10(3) UL (ref 0–0.7)
EOSINOPHIL NFR BLD AUTO: 1.4 %
ERYTHROCYTE [DISTWIDTH] IN BLOOD BY AUTOMATED COUNT: 12.9 %
FLUAV RNA SPEC QL NAA+PROBE: NOT DETECTED
FLUBV RNA SPEC QL NAA+PROBE: NOT DETECTED
HCT VFR BLD AUTO: 27.4 %
HGB BLD-MCNC: 9 G/DL
IMM GRANULOCYTES # BLD AUTO: 0.04 X10(3) UL (ref 0–1)
IMM GRANULOCYTES NFR BLD: 0.3 %
LACTATE SERPL-SCNC: 1.2 MMOL/L (ref 0.4–2)
LYMPHOCYTES # BLD AUTO: 2.45 X10(3) UL (ref 1–4)
LYMPHOCYTES NFR BLD AUTO: 20.6 %
MCH RBC QN AUTO: 33.5 PG (ref 26–34)
MCHC RBC AUTO-ENTMCNC: 32.8 G/DL (ref 31–37)
MCV RBC AUTO: 101.9 FL
METAPNEUMOVIRUS PCR:: NOT DETECTED
MONOCYTES # BLD AUTO: 0.85 X10(3) UL (ref 0.1–1)
MONOCYTES NFR BLD AUTO: 7.1 %
MYCOPLASMA PNEUMONIA PCR:: NOT DETECTED
NEUTROPHILS # BLD AUTO: 8.32 X10 (3) UL (ref 1.5–7.7)
NEUTROPHILS # BLD AUTO: 8.32 X10(3) UL (ref 1.5–7.7)
NEUTROPHILS NFR BLD AUTO: 70.1 %
PARAINFLUENZA 1 PCR:: NOT DETECTED
PARAINFLUENZA 2 PCR:: NOT DETECTED
PARAINFLUENZA 3 PCR:: NOT DETECTED
PARAINFLUENZA 4 PCR:: NOT DETECTED
PLATELET # BLD AUTO: 263 10(3)UL (ref 150–450)
RBC # BLD AUTO: 2.69 X10(6)UL
RHINOVIRUS/ENTERO PCR:: NOT DETECTED
RSV RNA SPEC QL NAA+PROBE: NOT DETECTED
SARS-COV-2 RNA NPH QL NAA+NON-PROBE: NOT DETECTED
VIT B12 SERPL-MCNC: >2000 PG/ML (ref 193–986)
WBC # BLD AUTO: 11.9 X10(3) UL (ref 4–11)

## 2024-03-01 PROCEDURE — 76705 ECHO EXAM OF ABDOMEN: CPT | Performed by: STUDENT IN AN ORGANIZED HEALTH CARE EDUCATION/TRAINING PROGRAM

## 2024-03-01 PROCEDURE — 99222 1ST HOSP IP/OBS MODERATE 55: CPT | Performed by: STUDENT IN AN ORGANIZED HEALTH CARE EDUCATION/TRAINING PROGRAM

## 2024-03-01 RX ORDER — MIDODRINE HYDROCHLORIDE 10 MG/1
10 TABLET ORAL 3 TIMES DAILY
Status: DISCONTINUED | OUTPATIENT
Start: 2024-03-01 | End: 2024-03-04

## 2024-03-01 RX ORDER — MELATONIN
3 NIGHTLY PRN
Status: DISCONTINUED | OUTPATIENT
Start: 2024-03-01 | End: 2024-03-04

## 2024-03-01 RX ORDER — MAGNESIUM OXIDE 400 MG/1
400 TABLET ORAL ONCE
Status: COMPLETED | OUTPATIENT
Start: 2024-03-01 | End: 2024-03-01

## 2024-03-01 RX ORDER — SENNOSIDES 8.6 MG
17.2 TABLET ORAL NIGHTLY PRN
Status: DISCONTINUED | OUTPATIENT
Start: 2024-03-01 | End: 2024-03-02

## 2024-03-01 RX ORDER — PROCHLORPERAZINE EDISYLATE 5 MG/ML
5 INJECTION INTRAMUSCULAR; INTRAVENOUS EVERY 8 HOURS PRN
Status: DISCONTINUED | OUTPATIENT
Start: 2024-03-01 | End: 2024-03-04

## 2024-03-01 RX ORDER — ENOXAPARIN SODIUM 100 MG/ML
40 INJECTION SUBCUTANEOUS DAILY
Status: DISCONTINUED | OUTPATIENT
Start: 2024-03-01 | End: 2024-03-04

## 2024-03-01 RX ORDER — MORPHINE SULFATE 4 MG/ML
4 INJECTION, SOLUTION INTRAMUSCULAR; INTRAVENOUS EVERY 2 HOUR PRN
Status: DISCONTINUED | OUTPATIENT
Start: 2024-03-01 | End: 2024-03-03

## 2024-03-01 RX ORDER — MORPHINE SULFATE 4 MG/ML
4 INJECTION, SOLUTION INTRAMUSCULAR; INTRAVENOUS EVERY 30 MIN PRN
Status: DISCONTINUED | OUTPATIENT
Start: 2024-03-01 | End: 2024-03-01

## 2024-03-01 RX ORDER — CHOLECALCIFEROL (VITAMIN D3) 125 MCG
5000 CAPSULE ORAL DAILY
Status: DISCONTINUED | OUTPATIENT
Start: 2024-03-01 | End: 2024-03-04

## 2024-03-01 RX ORDER — BISACODYL 10 MG
10 SUPPOSITORY, RECTAL RECTAL
Status: DISCONTINUED | OUTPATIENT
Start: 2024-03-01 | End: 2024-03-04

## 2024-03-01 RX ORDER — MORPHINE SULFATE 2 MG/ML
2 INJECTION, SOLUTION INTRAMUSCULAR; INTRAVENOUS EVERY 2 HOUR PRN
Status: DISCONTINUED | OUTPATIENT
Start: 2024-03-01 | End: 2024-03-03

## 2024-03-01 RX ORDER — ACETAMINOPHEN 325 MG/1
325 TABLET ORAL EVERY 8 HOURS PRN
Status: DISCONTINUED | OUTPATIENT
Start: 2024-03-01 | End: 2024-03-04

## 2024-03-01 RX ORDER — ONDANSETRON 2 MG/ML
4 INJECTION INTRAMUSCULAR; INTRAVENOUS EVERY 6 HOURS PRN
Status: DISCONTINUED | OUTPATIENT
Start: 2024-03-01 | End: 2024-03-04

## 2024-03-01 RX ORDER — MELATONIN
100 2 TIMES DAILY
Status: DISCONTINUED | OUTPATIENT
Start: 2024-03-01 | End: 2024-03-04

## 2024-03-01 RX ORDER — MORPHINE SULFATE 2 MG/ML
1 INJECTION, SOLUTION INTRAMUSCULAR; INTRAVENOUS EVERY 2 HOUR PRN
Status: DISCONTINUED | OUTPATIENT
Start: 2024-03-01 | End: 2024-03-03

## 2024-03-01 RX ORDER — ENEMA 19; 7 G/133ML; G/133ML
1 ENEMA RECTAL ONCE AS NEEDED
Status: DISCONTINUED | OUTPATIENT
Start: 2024-03-01 | End: 2024-03-04

## 2024-03-01 RX ORDER — FUROSEMIDE 20 MG/1
10 TABLET ORAL DAILY
Status: DISCONTINUED | OUTPATIENT
Start: 2024-03-01 | End: 2024-03-02

## 2024-03-01 RX ORDER — POLYETHYLENE GLYCOL 3350 17 G/17G
17 POWDER, FOR SOLUTION ORAL DAILY PRN
Status: DISCONTINUED | OUTPATIENT
Start: 2024-03-01 | End: 2024-03-04

## 2024-03-01 RX ORDER — POLYETHYLENE GLYCOL 3350 17 G/17G
17 POWDER, FOR SOLUTION ORAL DAILY
Status: DISCONTINUED | OUTPATIENT
Start: 2024-03-01 | End: 2024-03-04

## 2024-03-01 RX ORDER — FOLIC ACID 1 MG/1
1 TABLET ORAL DAILY
Status: DISCONTINUED | OUTPATIENT
Start: 2024-03-01 | End: 2024-03-04

## 2024-03-01 RX ORDER — HYDROCODONE BITARTRATE AND ACETAMINOPHEN 5; 325 MG/1; MG/1
1 TABLET ORAL EVERY 6 HOURS PRN
Status: DISCONTINUED | OUTPATIENT
Start: 2024-03-01 | End: 2024-03-04

## 2024-03-01 RX ORDER — DIPHENHYDRAMINE HCL 25 MG
25 CAPSULE ORAL EVERY 6 HOURS PRN
Status: DISCONTINUED | OUTPATIENT
Start: 2024-03-01 | End: 2024-03-04

## 2024-03-01 RX ORDER — PANTOPRAZOLE SODIUM 40 MG/1
40 TABLET, DELAYED RELEASE ORAL
Status: DISCONTINUED | OUTPATIENT
Start: 2024-03-01 | End: 2024-03-04

## 2024-03-01 NOTE — ED INITIAL ASSESSMENT (HPI)
Pt to ED sent by PCP for c/o abnormal labs - WBC - 14.4 drawn at 1304 today. Recent UTI and finished antibiotics 2 days ago. Hx of Liver Failure & had Paracentesis 3x. Pt reports low grade fever TMax-100.6 - took Tylenol 500 mg at 1630 today.

## 2024-03-01 NOTE — PLAN OF CARE
NURSING ADMISSION NOTE      Patient admitted via Cart  Oriented to room.  Safety precautions initiated.  Bed in low position.  Call light in reach.    Assumed car of 42 y/o female @0300  A/Ox4, RA

## 2024-03-01 NOTE — ED QUICK NOTES
Patient on call light. Requesting to know when she will go to a room. This RN explained transport is in who will take her up to her room. Patient denied any other needs at this time.

## 2024-03-01 NOTE — PLAN OF CARE
Assumed care of patient @0730.  No acute distress noted.   VSS on Ra.  E-noncard.   Non tele.   Voids.   General diet.  C/o abd pain, prn pain med.   Unable to perform paracentesis, MD notified.  LT and RT AC PIV, SL.   Standby assist.   All needs met at this time.   Problem: Patient/Family Goals  Goal: Patient/Family Long Term Goal  Description: Patient's Long Term Goal: Decrease WBCs  Interventions:  - IV abx?  - monitor labs  - See additional Care Plan goals for specific interventions  Outcome: Progressing  Goal: Patient/Family Short Term Goal  Description: Patient's Short Term Goal: pain management  Interventions:   - per MAR    - See additional Care Plan goals for specific interventions  Outcome: Progressing

## 2024-03-01 NOTE — PROGRESS NOTES
Multiple attempts to reach pt and messages left with no return call.  Patient went in for HFU appt with PCP on 2/29/24.  Encounter closing.

## 2024-03-01 NOTE — ED QUICK NOTES
Orders for admission, patient is aware of plan and ready to go upstairs. Any questions, please call ED RN Trudi at extension [phone].     Patient Covid vaccination status: Unvaccinated     COVID Test Ordered in ED: None    COVID Suspicion at Admission: N/A    Running Infusions:  Rocephin at 200 ml/hr    Mental Status/LOC at time of transport: a&o x 4    Other pertinent information:   CIWA score: N/A   NIH score:  N/A

## 2024-03-01 NOTE — H&P
[facility]     Tamy Wall Patient Status:  Observation    1980 MRN [id number]   Location [facility] 3NE-A Attending Sosa Wood MD   Hosp Day # 0 PCP Bria Griffin MD     Chief Complaint: Abdominal distension     Subjective:    History of Present Illness:     Tamy Wall is a 43 year old female with etoh liver cirrhosis with recurrent admissions- pt most recentl DC on 24- she comes today with  abnormal blood work. Pt was evaluated at PCP office for routine post hospital f/u- labs were ordered and she was contacted last night due to leucocytosis mainly. She notes some fevers at home  range, has a dry cough. She has just completed Abx for UTI and still has some mild dysuria, patient also notes her abdomen is filling up again and is tender.    History/Other:    Past Medical History:  Past Medical History:   Diagnosis Date    Fibroid, uterine 2014    7cm , up to 12 cm at end of pregnacy    Liver failure (HCC)      Past Surgical History:   Past Surgical History:   Procedure Laterality Date    ABD PARACENTESIS      APPENDECTOMY  1990      2014    [facility], fibroids    COLONOSCOPY N/A 2023    Procedure: COLONOSCOPY;  Surgeon: Israel Wesley DO;  Location:  ENDOSCOPY      Family History:   Family History   Problem Relation Age of Onset    Heart Disorder Mother     Other (fibroid) Mother      Social History:    reports that she quit smoking about 4 years ago. Her smoking use included cigarettes. She has a 0.6 pack-year smoking history. She has never used smokeless tobacco. She reports that she does not drink alcohol and does not use drugs.     Allergies: No Known Allergies    Medications:    Current Facility-Administered Medications on File Prior to Encounter   Medication Dose Route Frequency Provider Last Rate Last Admin    [COMPLETED] magnesium oxide (Mag-Ox) tab 400 mg  400 mg Oral Once Luz Maria Albert MD   400 mg  at 24 0840    [COMPLETED] magnesium oxide (Mag-Ox) tab 400 mg  400 mg Oral Once Tc Quigley MD   400 mg at 24 0645    [COMPLETED] albumin human (Albumin) 25% injection 25 g  25 g Intravenous Once Luz Maria Albert MD   Stopped at 24 1310    [COMPLETED] albumin human (Albumin) 25% injection 25 g  25 g Intravenous Once Douglas Zarate,    25 g at 24 0517    [COMPLETED] sodium phosphate 15 mmol in 0.9% NaCl 100mL IVPB premix  15 mmol Intravenous Once Cuong Pagan MD   15 mmol at 24 1248    [COMPLETED] magnesium oxide (Mag-Ox) tab 400 mg  400 mg Oral Once Cuong Pagan MD   400 mg at 24 1244    [COMPLETED] albumin human (Albumin) 25% injection 25 g  25 g Intravenous Once Cuong Pagan  mL/hr at 24 1343 25 g at 24 1343    [COMPLETED] calcium gluconate 1g in 100mL iso-NaCl IVPB premix  1 g Intravenous Once Gurpreet Lan MD   Stopped at 24 1720    [COMPLETED] dextrose 50% injection 50 mL  50 mL Intravenous Once Gurpreet Lan MD   50 mL at 24 1702    [COMPLETED] insulin regular human (Novolin R, Humulin R) 100 UNIT/ML injection 10 Units  10 Units Intravenous Once Gurpreet Lan MD   10 Units at 24 1702    [COMPLETED] iopamidol 76% (ISOVUE-370) injection for power injector  100 mL Intravenous ONCE PRN Gurpreet Lan MD   100 mL at 24 1745    [] ondansetron (Zofran) 4 MG/2ML injection 4 mg  4 mg Intravenous Q4H PRN Gurpreet Lan MD        [COMPLETED] morphINE PF 4 MG/ML injection 4 mg  4 mg Intravenous Once Gurpreet Lan MD   2 mg at 24 1900    [COMPLETED] ondansetron (Zofran) 4 MG/2ML injection 4 mg  4 mg Intravenous Once Gurpreet Lan MD   4 mg at 24 1859    [COMPLETED] cefTRIAXone (Rocephin) 1 g in D5W 100 mL IVPB-ADD  1 g Intravenous Once Gurpreet Lan  mL/hr at 24 1 g at 24    [COMPLETED] midodrine (ProAmatine) tab 10 mg  10 mg Oral Once Elliot  Douglas Liu DO   10 mg at 24 0531    [COMPLETED] albumin human (Albumin) 25% injection 25 g  25 g Intravenous Once Li Bull MD   25 g at 24 1517    [COMPLETED] sodium chloride 0.9 % IV bolus 500 mL  500 mL Intravenous Once Douglas Zarate DO   Stopped at 24 0350    [COMPLETED] furosemide (Lasix) tab 10 mg  10 mg Oral Once Halley Valdez DO   10 mg at 24 1405    [COMPLETED] spironolactone (Aldactone) tab 25 mg  25 mg Oral Once Halley Valdez DO   25 mg at 24 1404    [COMPLETED] midodrine (ProAmatine) tab 5 mg  5 mg Oral Once Melly Whelan MD   5 mg at 24 2052    [] sodium chloride 0.9% infusion   Intravenous Continuous Sosa Wood MD   Stopped at 24 2200    [COMPLETED] potassium chloride (K-Dur) tab 40 mEq  40 mEq Oral Q4H Sosa Wood MD   40 mEq at 24 1339    [COMPLETED] ketorolac (Toradol) 30 MG/ML injection 30 mg  30 mg Intravenous Once Sosa Wood MD   30 mg at 24 1034    [] sodium chloride 0.9% infusion   Intravenous Continuous Sosa Wood MD   Stopped at 24 0009    [COMPLETED] midodrine (ProAmatine) tab 2.5 mg  2.5 mg Oral Once Vicky Cordon DO   2.5 mg at 24 2144    [COMPLETED] sodium chloride 0.9 % IV bolus 500 mL  500 mL Intravenous Once Vicky Cordon DO   Stopped at 24 2224    [COMPLETED] potassium chloride (K-Dur) tab 40 mEq  40 mEq Oral Once Sosa Wood MD   40 mEq at 24 1328    [COMPLETED] ketorolac (Toradol) 15 MG/ML injection 15 mg  15 mg Intravenous Once Zena Wise MD   15 mg at 24 0046    [COMPLETED] potassium chloride (K-Dur) tab 40 mEq  40 mEq Oral Q4H Cuong Pagan MD   40 mEq at 24 1204    [COMPLETED] albumin human (Albumin) 25% injection 25 g  25 g Intravenous Once Arleen Stewart APRN   25 g at 24 1200    [COMPLETED] iopamidol 76% (ISOVUE-370) injection for power injector  65 mL Intravenous ONCE PRN Zena Wise MD   65 mL at 24  6041     Current Outpatient Medications on File Prior to Encounter   Medication Sig Dispense Refill    midodrine 10 MG Oral Tab Take 1 tablet (10 mg total) by mouth in the morning and 1 tablet (10 mg total) at noon and 1 tablet (10 mg total) in the evening. 90 tablet 1    pantoprazole 40 MG Oral Tab EC Take 1 tablet (40 mg total) by mouth every morning before breakfast. 90 tablet 1    folic acid 1 MG Oral Tab Take 1 tablet (1 mg total) by mouth daily. 30 tablet 2    diphenhydrAMINE (BENADRYL ALLERGY) 25 MG Oral Cap Take 1 capsule (25 mg total) by mouth every 6 (six) hours as needed for Itching (for itching). 60 capsule 1    FUROSEMIDE 20 MG Oral Tab Take 0.5 tablets (10 mg total) by mouth daily. 30 tablet 0    HYDROcodone-acetaminophen 5-325 MG Oral Tab Take 1 tablet by mouth every 6 (six) hours as needed. 24 tablet 0    polyethylene glycol, PEG 3350, 17 g Oral Powd Pack Take 17 g by mouth daily. 30 packet 0    Cholecalciferol 125 MCG (5000 UT) Oral Tab Take 1 tablet (5,000 Units total) by mouth daily.      acetaminophen 325 MG Oral Tab Take 1 tablet (325 mg total) by mouth every 6 (six) hours as needed for Pain.      thiamine 100 MG Oral Tab Take 1 tablet (100 mg total) by mouth in the morning and 1 tablet (100 mg total) before bedtime. 60 tablet 0    [] cephalexin 500 MG Oral Cap Take 1 capsule (500 mg total) by mouth 3 (three) times daily for 5 days. 15 capsule 0       Review of Systems:   A comprehensive review of systems was completed.    Pertinent positives and negatives noted in the HPI.    Objective:   Physical Exam:    BP 98/75 (BP Location: Left arm)   Pulse 85   Temp 98 °F (36.7 °C) (Oral)   Resp 20   Ht 5' 1\" (1.549 m)   Wt 108 lb (49 kg)   LMP 2024 (Approximate)   SpO2 99%   BMI 20.41 kg/m²   General: No acute distress, Alert  Respiratory: No rhonchi, no wheezes  Cardiovascular: S1, S2. Regular rate and rhythm  Abdomen: Soft, -tender, distended, positive bowel sounds  Neuro: No new  focal deficits  Extremities: No edema      Results:    Labs:      Labs Last 24 Hours:    Recent Labs   Lab 02/29/24  1304 02/29/24  2236   RBC 2.96* 2.81*   HGB 9.8* 9.7*   HCT 31.9* 29.3*   .8* 104.3*   MCH 33.1 34.5*   MCHC 30.7* 33.1   RDW 13.0 13.0   NEPRELIM 10.05* 8.93*   WBC 14.4* 12.6*   .0 281.0       Recent Labs   Lab 02/29/24  1304 02/29/24  2236   GLU 73 87   BUN 7* 6*   CREATSERUM 0.64 0.54*   EGFRCR 112 116   CA 9.0 8.7   ALB 2.8* 2.6*    140   K 3.9 4.5    108   CO2 25.0 27.0   ALKPHO 210* 209*   * 151*   ALT 39 36   BILT 1.8 1.7   TP 7.0 7.1       Lab Results   Component Value Date    INR 1.23 (H) 02/29/2024    INR 1.23 (H) 02/18/2024    INR 1.26 (H) 02/07/2024       No results for input(s): \"TROP\", \"TROPHS\", \"CK\" in the last 168 hours.    No results for input(s): \"TROP\", \"PBNP\" in the last 168 hours.    No results for input(s): \"PCT\" in the last 168 hours.    Imaging: Imaging data reviewed in Epic.    Assessment & Plan:      #Leucocytosis with fever  Diagnostic paracentesis with cultures- hold off therapeutic in the event she does have SBP  Resp viral panel  UA negative   #Anemia- chronic   #etoh liver cirrhosis with ascites  Continue diuretics   #chronic hypotension         Plan of care discussed with pt    Sosa Wood MD    Supplementary Documentation:     The 21st Century Cures Act makes medical notes like these available to patients in the interest of transparency. Please be advised this is a medical document. Medical documents are intended to carry relevant information, facts as evident, and the clinical opinion of the practitioner. The medical note is intended as peer to peer communication and may appear blunt or direct. It is written in medical language and may contain abbreviations or verbiage that are unfamiliar.

## 2024-03-01 NOTE — ED PROVIDER NOTES
Patient Seen in: [facility] Emergency Department      History     Chief Complaint   Patient presents with    Abnormal Result     Stated Complaint: abnormal labs    Subjective:   HPI    43-year-old female presenting to the emerged part for abnormal labs patient is been having increasing abdominal distention is getting a bit more tender she had outpatient blood drawn today which showed elevated white cell count now presents emergency department she denies any sort of urinary complaints and just was treated for urinary tract patient she is has a chronic cough as a result of influenza last month.  She denies any sort of vomiting or any other exacerbating relieving factors associated symptom    Objective:   Past Medical History:   Diagnosis Date    Fibroid, uterine 2014    7cm , up to 12 cm at end of pregnacy    Liver failure (HCC)               Past Surgical History:   Procedure Laterality Date    ABD PARACENTESIS      APPENDECTOMY  1990      2014    [facility], fibroids    COLONOSCOPY N/A 2023    Procedure: COLONOSCOPY;  Surgeon: Israel Wesley DO;  Location:  ENDOSCOPY                Social History     Socioeconomic History    Marital status:    Tobacco Use    Smoking status: Former     Types: Cigarettes    Smokeless tobacco: Never   Vaping Use    Vaping Use: Never used   Substance and Sexual Activity    Alcohol use: No     Alcohol/week: 0.0 standard drinks of alcohol    Drug use: No     Social Determinants of Health     Food Insecurity: Food Insecurity Present (2024)    Food Insecurity     Food Insecurity: Sometimes true   Transportation Needs: No Transportation Needs (2024)    Transportation Needs     Lack of Transportation: No   Housing Stability: Low Risk  (2024)    Housing Stability     Housing Instability: No              Review of Systems    Positive for stated complaint: abnormal labs  Other systems are as noted in HPI.  Constitutional  and vital signs reviewed.      All other systems reviewed and negative except as noted above.    Physical Exam     ED Triage Vitals [02/29/24 2132]   /72   Pulse 89   Resp 18   Temp 98.2 °F (36.8 °C)   Temp src Oral   SpO2 98 %   O2 Device None (Room air)       Current:/80   Pulse 81   Temp 98.2 °F (36.8 °C) (Oral)   Resp 13   Ht 154.9 cm (5' 1\")   Wt 48.3 kg   LMP 01/01/2024 (Approximate)   SpO2 98%   BMI 20.10 kg/m²         Physical Exam  Awake alert patient appears no distress HEENT exam is normal lungs are clear cardiovascular exam regular rhythm abdomen distended abdomen diffusely tender no rebound no guarding shows no COVID cyanosis or edema no rash back exam is normal skin is nondiaphoretic with no focal neurologic deficits or asterixis       ED Course     Labs Reviewed   COMP METABOLIC PANEL (14) - Abnormal; Notable for the following components:       Result Value    BUN 6 (*)     Creatinine 0.54 (*)      (*)     Alkaline Phosphatase 209 (*)     Albumin 2.6 (*)     Globulin  4.5 (*)     A/G Ratio 0.6 (*)     All other components within normal limits   URINALYSIS, ROUTINE - Abnormal; Notable for the following components:    Clarity Urine Turbid (*)     Leukocyte Esterase Urine 75 (*)     WBC Urine 6-10 (*)     Squamous Epi. Cells Few (*)     All other components within normal limits   CBC W/ DIFFERENTIAL - Abnormal; Notable for the following components:    WBC 12.6 (*)     RBC 2.81 (*)     HGB 9.7 (*)     HCT 29.3 (*)     .3 (*)     MCH 34.5 (*)     Neutrophil Absolute Prelim 8.93 (*)     Neutrophil Absolute 8.93 (*)     All other components within normal limits   LACTIC ACID, PLASMA - Normal   POCT PREGNANCY URINE - Normal   CBC WITH DIFFERENTIAL WITH PLATELET    Narrative:     The following orders were created for panel order CBC With Differential With Platelet.  Procedure                               Abnormality         Status                     ---------                                -----------         ------                     CBC W/ DIFFERENTIAL[[id number]]          Abnormal            Final result                 Please view results for these tests on the individual orders.   BLOOD CULTURE   BLOOD CULTURE             Differential diagnosis includes SBP, sepsis         MDM                                         Medical Decision Making  43-year-old female presenting the emergency department for abdominal pain.  Fever.  Patient was noted to IV established cardiac monitor shows a sinus rhythm pulse ox shows no signs of hypoxia CBC shows elevated white count metabolic panel stable renal function patient was ordered Rocephin will be admitted for antibiotics and further evaluation  This note was prepared using Dragon Medical voice recognition dictation software.  As a result errors may occur.  When identified to these areas have been corrected.  While every attempt is made to correct errors during dictation discrepancies may still exist.  Please contact if there are any errors    Problems Addressed:  SBP (spontaneous bacterial peritonitis) (HCC): acute illness or injury    Amount and/or Complexity of Data Reviewed  Labs: ordered. Decision-making details documented in ED Course.  ECG/medicine tests: ordered and independent interpretation performed. Decision-making details documented in ED Course.    Risk  Decision regarding hospitalization.        Disposition and Plan     Clinical Impression:  1. SBP (spontaneous bacterial peritonitis) (HCC)         Disposition:  There is no disposition on file for this visit.  There is no disposition time on file for this visit.    Follow-up:  No follow-up provider specified.        Medications Prescribed:  Current Discharge Medication List

## 2024-03-02 ENCOUNTER — APPOINTMENT (OUTPATIENT)
Dept: GENERAL RADIOLOGY | Facility: HOSPITAL | Age: 44
End: 2024-03-02
Attending: STUDENT IN AN ORGANIZED HEALTH CARE EDUCATION/TRAINING PROGRAM
Payer: MEDICAID

## 2024-03-02 ENCOUNTER — APPOINTMENT (OUTPATIENT)
Dept: CT IMAGING | Facility: HOSPITAL | Age: 44
End: 2024-03-02
Attending: STUDENT IN AN ORGANIZED HEALTH CARE EDUCATION/TRAINING PROGRAM
Payer: MEDICAID

## 2024-03-02 PROBLEM — K70.31 ALCOHOLIC CIRRHOSIS OF LIVER WITH ASCITES (HCC): Status: ACTIVE | Noted: 2024-02-18

## 2024-03-02 PROBLEM — K59.00 CONSTIPATION: Status: ACTIVE | Noted: 2024-03-02

## 2024-03-02 LAB
ALBUMIN SERPL-MCNC: 2.2 G/DL (ref 3.4–5)
ALBUMIN/GLOB SERPL: 0.5 {RATIO} (ref 1–2)
ALP LIVER SERPL-CCNC: 174 U/L
ALT SERPL-CCNC: 28 U/L
ANION GAP SERPL CALC-SCNC: 4 MMOL/L (ref 0–18)
AST SERPL-CCNC: 124 U/L (ref 15–37)
BASOPHILS # BLD AUTO: 0.09 X10(3) UL (ref 0–0.2)
BASOPHILS NFR BLD AUTO: 0.6 %
BILIRUB SERPL-MCNC: 1.2 MG/DL (ref 0.1–2)
BUN BLD-MCNC: 5 MG/DL (ref 9–23)
CALCIUM BLD-MCNC: 8.7 MG/DL (ref 8.5–10.1)
CHLORIDE SERPL-SCNC: 109 MMOL/L (ref 98–112)
CO2 SERPL-SCNC: 27 MMOL/L (ref 21–32)
CREAT BLD-MCNC: 0.42 MG/DL
EGFRCR SERPLBLD CKD-EPI 2021: 124 ML/MIN/1.73M2 (ref 60–?)
EOSINOPHIL # BLD AUTO: 0.18 X10(3) UL (ref 0–0.7)
EOSINOPHIL NFR BLD AUTO: 1.3 %
ERYTHROCYTE [DISTWIDTH] IN BLOOD BY AUTOMATED COUNT: 12.8 %
GLOBULIN PLAS-MCNC: 4.1 G/DL (ref 2.8–4.4)
GLUCOSE BLD-MCNC: 98 MG/DL (ref 70–99)
HCT VFR BLD AUTO: 28.5 %
HGB BLD-MCNC: 9.4 G/DL
IMM GRANULOCYTES # BLD AUTO: 0.07 X10(3) UL (ref 0–1)
IMM GRANULOCYTES NFR BLD: 0.5 %
LYMPHOCYTES # BLD AUTO: 2.96 X10(3) UL (ref 1–4)
LYMPHOCYTES NFR BLD AUTO: 21 %
MAGNESIUM SERPL-MCNC: 1.8 MG/DL (ref 1.6–2.6)
MCH RBC QN AUTO: 33.3 PG (ref 26–34)
MCHC RBC AUTO-ENTMCNC: 33 G/DL (ref 31–37)
MCV RBC AUTO: 101.1 FL
MONOCYTES # BLD AUTO: 1.02 X10(3) UL (ref 0.1–1)
MONOCYTES NFR BLD AUTO: 7.2 %
NEUTROPHILS # BLD AUTO: 9.77 X10 (3) UL (ref 1.5–7.7)
NEUTROPHILS # BLD AUTO: 9.77 X10(3) UL (ref 1.5–7.7)
NEUTROPHILS NFR BLD AUTO: 69.4 %
OSMOLALITY SERPL CALC.SUM OF ELEC: 287 MOSM/KG (ref 275–295)
PLATELET # BLD AUTO: 309 10(3)UL (ref 150–450)
POTASSIUM SERPL-SCNC: 3.9 MMOL/L (ref 3.5–5.1)
PROT SERPL-MCNC: 6.3 G/DL (ref 6.4–8.2)
RBC # BLD AUTO: 2.82 X10(6)UL
SODIUM SERPL-SCNC: 140 MMOL/L (ref 136–145)
WBC # BLD AUTO: 14.1 X10(3) UL (ref 4–11)

## 2024-03-02 PROCEDURE — 99232 SBSQ HOSP IP/OBS MODERATE 35: CPT | Performed by: STUDENT IN AN ORGANIZED HEALTH CARE EDUCATION/TRAINING PROGRAM

## 2024-03-02 PROCEDURE — 71046 X-RAY EXAM CHEST 2 VIEWS: CPT | Performed by: STUDENT IN AN ORGANIZED HEALTH CARE EDUCATION/TRAINING PROGRAM

## 2024-03-02 PROCEDURE — 74177 CT ABD & PELVIS W/CONTRAST: CPT | Performed by: STUDENT IN AN ORGANIZED HEALTH CARE EDUCATION/TRAINING PROGRAM

## 2024-03-02 RX ORDER — SENNOSIDES 8.6 MG
17.2 TABLET ORAL 2 TIMES DAILY
Status: DISCONTINUED | OUTPATIENT
Start: 2024-03-02 | End: 2024-03-04

## 2024-03-02 RX ORDER — MAGNESIUM OXIDE 400 MG/1
400 TABLET ORAL ONCE
Status: COMPLETED | OUTPATIENT
Start: 2024-03-02 | End: 2024-03-02

## 2024-03-02 RX ORDER — FUROSEMIDE 20 MG/1
20 TABLET ORAL DAILY
Status: DISCONTINUED | OUTPATIENT
Start: 2024-03-03 | End: 2024-03-04

## 2024-03-02 NOTE — PROGRESS NOTES
[facility]   part of [facility]     Hospitalist Progress Note     Tamy Wall Patient Status:  Observation    1980 MRN [id number]   Location [facility] 3NE-A Attending Vitaliy, Ivan Lew, *   Hosp Day # 0 PCP Bria Griffin MD     Chief Complaint: Abdominal pain     Subjective:      - Remains afebrile, US completed but no significant fluid pocket available for paracentesis   - endorsing ongoing discomfort with abd distension    - Notes constipation, no BM since admission, having cramping abd pain   - Denies other cp, cough, congestion, rhinorrhea, dysuria, urinary frequency    Objective:    Review of Systems:   A comprehensive review of systems was completed; pertinent positive and negatives stated in subjective.    Vital signs:  Temp:  [98.1 °F (36.7 °C)-98.3 °F (36.8 °C)] 98.3 °F (36.8 °C)  Pulse:  [75-78] 75  Resp:  [17-18] 17  BP: ()/(64-74) 102/74  SpO2:  [100 %] 100 %    Physical Exam:    General: No acute distress  Respiratory: no wheezes, no rhonchi  Cardiovascular: S1, S2, regular rate and rhythm  Abdomen: Soft, diffusely ttp, non-distended, positive bowel sounds  Neuro: No new focal deficits.   Extremities: no edema    Diagnostic Data:    Labs:  Recent Labs   Lab 24  1304 24  2236 24  1210   WBC 14.4* 12.6* 11.9*   HGB 9.8* 9.7* 9.0*   .8* 104.3* 101.9*   .0 281.0 263.0   INR 1.23*  --   --        Recent Labs   Lab 24  1304 24  2236 24  0613   GLU 73 87 98   BUN 7* 6* 5*   CREATSERUM 0.64 0.54* 0.42*   CA 9.0 8.7 8.7   ALB 2.8* 2.6* 2.2*    140 140   K 3.9 4.5 3.9    108 109   CO2 25.0 27.0 27.0   ALKPHO 210* 209* 174*   * 151* 124*   ALT 39 36 28   BILT 1.8 1.7 1.2   TP 7.0 7.1 6.3*       Estimated Creatinine Clearance: 129 mL/min (A) (based on SCr of 0.42 mg/dL (L)).    No results for input(s): \"TROP\", \"TROPHS\", \"CK\" in the last 168 hours.    Recent Labs   Lab 24  1304   PTP 15.6*   INR 1.23*                   Microbiology    Hospital Encounter on 02/29/24   1. Blood Culture     Status: None (Preliminary result)    Collection Time: 02/29/24 10:50 PM    Specimen: Blood,peripheral   Result Value Ref Range    Blood Culture Result No Growth 1 Day N/A         Imaging: Reviewed in Epic.    Medications:    magnesium oxide  400 mg Oral Once    [Held by provider] enoxaparin  40 mg Subcutaneous Daily    thiamine  100 mg Oral BID    cholecalciferol  5,000 Units Oral Daily    polyethylene glycol (PEG 3350)  17 g Oral Daily    furosemide  10 mg Oral Daily    midodrine  10 mg Oral TID    pantoprazole  40 mg Oral QAM AC    folic acid  1 mg Oral Daily    cefTRIAXone  1 g Intravenous Q24H       Assessment & Plan:      # Leukocytosis   - continuing Ceftriaxone for empiric SBP treatment; does have hx of prior paracentesis with low fluid protein levels, so can plan for dc with SBP prophy abx   - Bcx - NGTD   - RVP negative, UA with mild pyuria though covered by above abx    - Will add on CXR PA/LA and CT A/p to r/o acute intra-abdominal pathology     # Decompensated alcoholic cirrhosis    - Could not tolerated nguyen in past 2/2 hyperkalemia   - continue lasix, midodrine   - f/u with GI as outpt; has appt on 3/6 with Dr. Smyth at 11:40   - No HE or GIB noted    # Constipation - Mirilax, senna, milk of mag  # Anemia - likely 2/2 liver disease    Ivan Burks MD    Supplementary Documentation:     Quality:  DVT Mechanical Prophylaxis:   SCDs,    DVT Pharmacologic Prophylaxis   Medication    [Held by provider] enoxaparin (Lovenox) 40 MG/0.4ML SUBQ injection 40 mg                Code Status: Prior  Larose: No urinary catheter in place  Larose Duration (in days):   Central line:    MICKY:     The 21st Century Cures Act makes medical notes like these available to patients in the interest of transparency. Please be advised this is a medical document. Medical documents are intended to carry relevant information, facts as  evident, and the clinical opinion of the practitioner. The medical note is intended as peer to peer communication and may appear blunt or direct. It is written in medical language and may contain abbreviations or verbiage that are unfamiliar.

## 2024-03-02 NOTE — PLAN OF CARE
The patient is A/Ox4   On RA, no SOB  No tele  Vitals Stable  Afebrile  C/O abdominal pain, improved with PRN medications per MAR  On Rocephin per MAR  Safety precautions in place. Staff will continue to monitor.               Problem: Patient/Family Goals  Goal: Patient/Family Long Term Goal  Description: Patient's Long Term Goal: Decrease WBCs    Interventions:  - IV abx?  - monitor labs  - See additional Care Plan goals for specific interventions  Outcome: Progressing  Goal: Patient/Family Short Term Goal  Description: Patient's Short Term Goal: pain management    Interventions:   - per MAR    - See additional Care Plan goals for specific interventions  Outcome: Progressing     Problem: PAIN - ADULT  Goal: Verbalizes/displays adequate comfort level or patient's stated pain goal  Description: INTERVENTIONS:  - Encourage pt to monitor pain and request assistance  - Assess pain using appropriate pain scale  - Administer analgesics based on type and severity of pain and evaluate response  - Implement non-pharmacological measures as appropriate and evaluate response  - Consider cultural and social influences on pain and pain management  - Manage/alleviate anxiety  - Utilize distraction and/or relaxation techniques  - Monitor for opioid side effects  - Notify MD/LIP if interventions unsuccessful or patient reports new pain  - Anticipate increased pain with activity and pre-medicate as appropriate  Outcome: Progressing     Problem: RISK FOR INFECTION - ADULT  Goal: Absence of fever/infection during anticipated neutropenic period  Description: INTERVENTIONS  - Monitor WBC  - Administer growth factors as ordered  - Implement neutropenic guidelines  Outcome: Progressing     Problem: SAFETY ADULT - FALL  Goal: Free from fall injury  Description: INTERVENTIONS:  - Assess pt frequently for physical needs  - Identify cognitive and physical deficits and behaviors that affect risk of falls.  - [place] fall precautions as  indicated by assessment.  - Educate pt/family on patient safety including physical limitations  - Instruct pt to call for assistance with activity based on assessment  - Modify environment to reduce risk of injury  - Provide assistive devices as appropriate  - Consider OT/PT consult to assist with strengthening/mobility  - Encourage toileting schedule  Outcome: Progressing     Problem: DISCHARGE PLANNING  Goal: Discharge to home or other facility with appropriate resources  Description: INTERVENTIONS:  - Identify barriers to discharge w/pt and caregiver  - Include patient/family/discharge partner in discharge planning  - Arrange for needed discharge resources and transportation as appropriate  - Identify discharge learning needs (meds, wound care, etc)  - Arrange for interpreters to assist at discharge as needed  - Consider post-discharge preferences of patient/family/discharge partner  - Complete POLST form as appropriate  - Assess patient's ability to be responsible for managing their own health  - Refer to Case Management Department for coordinating discharge planning if the patient needs post-hospital services based on physician/LIP order or complex needs related to functional status, cognitive ability or social support system  Outcome: Progressing

## 2024-03-02 NOTE — PROGRESS NOTES
Assumed care at 1930  A&ox4  RA  No tele  Non cardiac ep  Continent  Up ad santosh  PRN morphine for abd pain  IV rocephin q24  PO lasix  Scheduled midodrine  Regular diet  Safety precautions in place  All needs met at this time  Continue current POC

## 2024-03-03 ENCOUNTER — APPOINTMENT (OUTPATIENT)
Dept: CT IMAGING | Facility: HOSPITAL | Age: 44
End: 2024-03-03
Attending: STUDENT IN AN ORGANIZED HEALTH CARE EDUCATION/TRAINING PROGRAM
Payer: MEDICAID

## 2024-03-03 PROBLEM — M54.6 ACUTE MIDLINE THORACIC BACK PAIN: Status: ACTIVE | Noted: 2024-03-03

## 2024-03-03 LAB
BASOPHILS # BLD AUTO: 0.1 X10(3) UL (ref 0–0.2)
BASOPHILS NFR BLD AUTO: 0.7 %
EOSINOPHIL # BLD AUTO: 0.26 X10(3) UL (ref 0–0.7)
EOSINOPHIL NFR BLD AUTO: 1.8 %
ERYTHROCYTE [DISTWIDTH] IN BLOOD BY AUTOMATED COUNT: 12.7 %
HCT VFR BLD AUTO: 33.5 %
HGB BLD-MCNC: 10.4 G/DL
IMM GRANULOCYTES # BLD AUTO: 0.04 X10(3) UL (ref 0–1)
IMM GRANULOCYTES NFR BLD: 0.3 %
LYMPHOCYTES # BLD AUTO: 3.52 X10(3) UL (ref 1–4)
LYMPHOCYTES NFR BLD AUTO: 24 %
MAGNESIUM SERPL-MCNC: 2 MG/DL (ref 1.6–2.6)
MCH RBC QN AUTO: 32.3 PG (ref 26–34)
MCHC RBC AUTO-ENTMCNC: 31 G/DL (ref 31–37)
MCV RBC AUTO: 104 FL
MONOCYTES # BLD AUTO: 0.89 X10(3) UL (ref 0.1–1)
MONOCYTES NFR BLD AUTO: 6.1 %
NEUTROPHILS # BLD AUTO: 9.88 X10 (3) UL (ref 1.5–7.7)
NEUTROPHILS # BLD AUTO: 9.88 X10(3) UL (ref 1.5–7.7)
NEUTROPHILS NFR BLD AUTO: 67.1 %
PLATELET # BLD AUTO: 358 10(3)UL (ref 150–450)
RBC # BLD AUTO: 3.22 X10(6)UL
WBC # BLD AUTO: 14.7 X10(3) UL (ref 4–11)

## 2024-03-03 PROCEDURE — 72132 CT LUMBAR SPINE W/DYE: CPT | Performed by: STUDENT IN AN ORGANIZED HEALTH CARE EDUCATION/TRAINING PROGRAM

## 2024-03-03 PROCEDURE — 72129 CT CHEST SPINE W/DYE: CPT | Performed by: STUDENT IN AN ORGANIZED HEALTH CARE EDUCATION/TRAINING PROGRAM

## 2024-03-03 PROCEDURE — 99232 SBSQ HOSP IP/OBS MODERATE 35: CPT | Performed by: STUDENT IN AN ORGANIZED HEALTH CARE EDUCATION/TRAINING PROGRAM

## 2024-03-03 RX ORDER — LACTULOSE 10 G/15ML
20 SOLUTION ORAL 3 TIMES DAILY
Status: DISCONTINUED | OUTPATIENT
Start: 2024-03-03 | End: 2024-03-04

## 2024-03-03 RX ORDER — KETOROLAC TROMETHAMINE 15 MG/ML
15 INJECTION, SOLUTION INTRAMUSCULAR; INTRAVENOUS EVERY 6 HOURS PRN
Status: DISCONTINUED | OUTPATIENT
Start: 2024-03-03 | End: 2024-03-04

## 2024-03-03 NOTE — PLAN OF CARE
The patient is A/Ox4   On RA, no SOB  No tele ordered  Vitals Stable  Afebrile  C/O abdominal \"pressure\" and pain this morning, refuses pain medications   Pt reports no BM overnight, bowel regimen ordered. Lactulose added by MD and given per MAR.   On Rocephin per MAR  Safety precautions in place. Staff will continue to monitor.               Problem: Patient/Family Goals  Goal: Patient/Family Long Term Goal  Description: Patient's Long Term Goal: Decrease WBCs    Interventions:  - IV abx?  - monitor labs  - See additional Care Plan goals for specific interventions  Outcome: Progressing  Goal: Patient/Family Short Term Goal  Description: Patient's Short Term Goal: pain management    Interventions:   - per MAR    - See additional Care Plan goals for specific interventions  Outcome: Progressing     Problem: PAIN - ADULT  Goal: Verbalizes/displays adequate comfort level or patient's stated pain goal  Description: INTERVENTIONS:  - Encourage pt to monitor pain and request assistance  - Assess pain using appropriate pain scale  - Administer analgesics based on type and severity of pain and evaluate response  - Implement non-pharmacological measures as appropriate and evaluate response  - Consider cultural and social influences on pain and pain management  - Manage/alleviate anxiety  - Utilize distraction and/or relaxation techniques  - Monitor for opioid side effects  - Notify MD/LIP if interventions unsuccessful or patient reports new pain  - Anticipate increased pain with activity and pre-medicate as appropriate  Outcome: Progressing     Problem: RISK FOR INFECTION - ADULT  Goal: Absence of fever/infection during anticipated neutropenic period  Description: INTERVENTIONS  - Monitor WBC  - Administer growth factors as ordered  - Implement neutropenic guidelines  Outcome: Progressing     Problem: SAFETY ADULT - FALL  Goal: Free from fall injury  Description: INTERVENTIONS:  - Assess pt frequently for physical needs  -  Identify cognitive and physical deficits and behaviors that affect risk of falls.  - [place] fall precautions as indicated by assessment.  - Educate pt/family on patient safety including physical limitations  - Instruct pt to call for assistance with activity based on assessment  - Modify environment to reduce risk of injury  - Provide assistive devices as appropriate  - Consider OT/PT consult to assist with strengthening/mobility  - Encourage toileting schedule  Outcome: Progressing     Problem: DISCHARGE PLANNING  Goal: Discharge to home or other facility with appropriate resources  Description: INTERVENTIONS:  - Identify barriers to discharge w/pt and caregiver  - Include patient/family/discharge partner in discharge planning  - Arrange for needed discharge resources and transportation as appropriate  - Identify discharge learning needs (meds, wound care, etc)  - Arrange for interpreters to assist at discharge as needed  - Consider post-discharge preferences of patient/family/discharge partner  - Complete POLST form as appropriate  - Assess patient's ability to be responsible for managing their own health  - Refer to Case Management Department for coordinating discharge planning if the patient needs post-hospital services based on physician/LIP order or complex needs related to functional status, cognitive ability or social support system  Outcome: Progressing

## 2024-03-03 NOTE — PLAN OF CARE
Pt is A&O x4. Pleasant. VSS. RA. Lungs clear. IV rocephin q24hr. PIV is SL. Pt c/o abd and back pain relieved with PRN pain medication given per MAR. General diet. Up ad santosh. Abd is distended and causing discomfort. Pt has not had BM since PTA. Scheduled stool softener given as ordered.     Problem: Patient/Family Goals  Goal: Patient/Family Long Term Goal  Description: Patient's Long Term Goal: Decrease WBCs    Interventions:  - IV abx?  - monitor labs  - See additional Care Plan goals for specific interventions  Outcome: Progressing  Goal: Patient/Family Short Term Goal  Description: Patient's Short Term Goal: pain management    Interventions:   - per MAR    - See additional Care Plan goals for specific interventions  Outcome: Progressing     Problem: PAIN - ADULT  Goal: Verbalizes/displays adequate comfort level or patient's stated pain goal  Description: INTERVENTIONS:  - Encourage pt to monitor pain and request assistance  - Assess pain using appropriate pain scale  - Administer analgesics based on type and severity of pain and evaluate response  - Implement non-pharmacological measures as appropriate and evaluate response  - Consider cultural and social influences on pain and pain management  - Manage/alleviate anxiety  - Utilize distraction and/or relaxation techniques  - Monitor for opioid side effects  - Notify MD/LIP if interventions unsuccessful or patient reports new pain  - Anticipate increased pain with activity and pre-medicate as appropriate  Outcome: Progressing     Problem: RISK FOR INFECTION - ADULT  Goal: Absence of fever/infection during anticipated neutropenic period  Description: INTERVENTIONS  - Monitor WBC  - Administer growth factors as ordered  - Implement neutropenic guidelines  Outcome: Progressing     Problem: SAFETY ADULT - FALL  Goal: Free from fall injury  Description: INTERVENTIONS:  - Assess pt frequently for physical needs  - Identify cognitive and physical deficits and behaviors  that affect risk of falls.  - [place] fall precautions as indicated by assessment.  - Educate pt/family on patient safety including physical limitations  - Instruct pt to call for assistance with activity based on assessment  - Modify environment to reduce risk of injury  - Provide assistive devices as appropriate  - Consider OT/PT consult to assist with strengthening/mobility  - Encourage toileting schedule  Outcome: Progressing     Problem: DISCHARGE PLANNING  Goal: Discharge to home or other facility with appropriate resources  Description: INTERVENTIONS:  - Identify barriers to discharge w/pt and caregiver  - Include patient/family/discharge partner in discharge planning  - Arrange for needed discharge resources and transportation as appropriate  - Identify discharge learning needs (meds, wound care, etc)  - Arrange for interpreters to assist at discharge as needed  - Consider post-discharge preferences of patient/family/discharge partner  - Complete POLST form as appropriate  - Assess patient's ability to be responsible for managing their own health  - Refer to Case Management Department for coordinating discharge planning if the patient needs post-hospital services based on physician/LIP order or complex needs related to functional status, cognitive ability or social support system  Outcome: Progressing

## 2024-03-03 NOTE — PROGRESS NOTES
[facility]   part of [facility]     Hospitalist Progress Note     Tamy Wall Patient Status:  Observation    1980 MRN [id number]   Location [facility] 3NE-A Attending Burks, Ivan Lew, *   Hosp Day # 0 PCP Bria Griffin MD     Chief Complaint: Abdominal pain     Subjective:      - Remains afebrile, though WBC still elevated, now notes she has been having thoracic and lumbar back pain for the past week, with associated tingling of bilateral feet, she is concerned about infection in that region   - Still without significant bowel movement following laxative regimen     Objective:    Review of Systems:   A comprehensive review of systems was completed; pertinent positive and negatives stated in subjective.    Vital signs:  Temp:  [97.8 °F (36.6 °C)-99 °F (37.2 °C)] 98.5 °F (36.9 °C)  Pulse:  [57-72] 72  Resp:  [15-18] 18  BP: ()/(64-74) 92/68    Physical Exam:    General: No acute distress  Respiratory: no wheezes, no rhonchi  Cardiovascular: S1, S2, regular rate and rhythm  Abdomen: Soft, diffusely ttp, moderately-distended, positive bowel sounds  Neuro: No new focal deficits.   Extremities: no edema    Diagnostic Data:    Labs:  Recent Labs   Lab 24  1304 24  2236 24  1210 24  0843   WBC 14.4* 12.6* 11.9* 14.1*   HGB 9.8* 9.7* 9.0* 9.4*   .8* 104.3* 101.9* 101.1*   .0 281.0 263.0 309.0   INR 1.23*  --   --   --        Recent Labs   Lab 24  1304 24  2236 24  0613   GLU 73 87 98   BUN 7* 6* 5*   CREATSERUM 0.64 0.54* 0.42*   CA 9.0 8.7 8.7   ALB 2.8* 2.6* 2.2*    140 140   K 3.9 4.5 3.9    108 109   CO2 25.0 27.0 27.0   ALKPHO 210* 209* 174*   * 151* 124*   ALT 39 36 28   BILT 1.8 1.7 1.2   TP 7.0 7.1 6.3*       Estimated Creatinine Clearance: 129 mL/min (A) (based on SCr of 0.42 mg/dL (L)).    No results for input(s): \"TROP\", \"TROPHS\", \"CK\" in the last 168 hours.    Recent Labs   Lab 24  1304   PTP  15.6*   INR 1.23*                  Microbiology    Hospital Encounter on 02/29/24   1. Blood Culture     Status: None (Preliminary result)    Collection Time: 02/29/24 10:50 PM    Specimen: Blood,peripheral   Result Value Ref Range    Blood Culture Result No Growth 2 Days N/A         Imaging: Reviewed in Epic.    Medications:    sennosides  17.2 mg Oral BID    furosemide  20 mg Oral Daily    [Held by provider] enoxaparin  40 mg Subcutaneous Daily    thiamine  100 mg Oral BID    cholecalciferol  5,000 Units Oral Daily    polyethylene glycol (PEG 3350)  17 g Oral Daily    midodrine  10 mg Oral TID    pantoprazole  40 mg Oral QAM AC    folic acid  1 mg Oral Daily    cefTRIAXone  1 g Intravenous Q24H       Assessment & Plan:      # Leukocytosis   - continuing Ceftriaxone for empiric SBP treatment; does have hx of prior paracentesis with low fluid protein levels, so can plan for dc with SBP prophy abx   - Bcx - NGTD   - RVP negative, UA with mild pyuria though covered by above abx    - CXR PA/LA without acute pathology and CT A/p with significant constipation, no other acute pathology    - Can add on CT T/L spine     # Decompensated alcoholic cirrhosis    - Could not tolerated nguyen in past 2/2 hyperkalemia   - continue lasix, midodrine   - f/u with GI as outpt; has appt on 3/6 with Dr. Smyth at 11:40   - No HE or GIB noted    # Constipation - Mirilax, senna, milk of mag; lactulose and bisacodyl suppository   # Anemia - likely 2/2 liver disease    Ivan Burks MD    Supplementary Documentation:     Quality:  DVT Mechanical Prophylaxis:   SCDs,    DVT Pharmacologic Prophylaxis   Medication    [Held by provider] enoxaparin (Lovenox) 40 MG/0.4ML SUBQ injection 40 mg                Code Status: Prior  Larose: No urinary catheter in place  Larose Duration (in days):   Central line:    MICKY:     The 21st Century Cures Act makes medical notes like these available to patients in the interest of transparency. Please be  advised this is a medical document. Medical documents are intended to carry relevant information, facts as evident, and the clinical opinion of the practitioner. The medical note is intended as peer to peer communication and may appear blunt or direct. It is written in medical language and may contain abbreviations or verbiage that are unfamiliar.

## 2024-03-04 VITALS
TEMPERATURE: 98 F | BODY MASS INDEX: 20.39 KG/M2 | WEIGHT: 108 LBS | RESPIRATION RATE: 18 BRPM | HEART RATE: 64 BPM | HEIGHT: 61 IN | DIASTOLIC BLOOD PRESSURE: 62 MMHG | SYSTOLIC BLOOD PRESSURE: 94 MMHG | OXYGEN SATURATION: 98 %

## 2024-03-04 LAB
BASOPHILS # BLD AUTO: 0.07 X10(3) UL (ref 0–0.2)
BASOPHILS NFR BLD AUTO: 0.6 %
EOSINOPHIL # BLD AUTO: 0.27 X10(3) UL (ref 0–0.7)
EOSINOPHIL NFR BLD AUTO: 2.1 %
ERYTHROCYTE [DISTWIDTH] IN BLOOD BY AUTOMATED COUNT: 12.9 %
HCT VFR BLD AUTO: 28.2 %
HGB BLD-MCNC: 9.4 G/DL
IMM GRANULOCYTES # BLD AUTO: 0.05 X10(3) UL (ref 0–1)
IMM GRANULOCYTES NFR BLD: 0.4 %
LYMPHOCYTES # BLD AUTO: 3.42 X10(3) UL (ref 1–4)
LYMPHOCYTES NFR BLD AUTO: 26.9 %
MCH RBC QN AUTO: 34.2 PG (ref 26–34)
MCHC RBC AUTO-ENTMCNC: 33.3 G/DL (ref 31–37)
MCV RBC AUTO: 102.5 FL
MONOCYTES # BLD AUTO: 1.07 X10(3) UL (ref 0.1–1)
MONOCYTES NFR BLD AUTO: 8.4 %
NEUTROPHILS # BLD AUTO: 7.84 X10 (3) UL (ref 1.5–7.7)
NEUTROPHILS # BLD AUTO: 7.84 X10(3) UL (ref 1.5–7.7)
NEUTROPHILS NFR BLD AUTO: 61.6 %
PLATELET # BLD AUTO: 288 10(3)UL (ref 150–450)
RBC # BLD AUTO: 2.75 X10(6)UL
WBC # BLD AUTO: 12.7 X10(3) UL (ref 4–11)

## 2024-03-04 PROCEDURE — 99239 HOSP IP/OBS DSCHRG MGMT >30: CPT | Performed by: STUDENT IN AN ORGANIZED HEALTH CARE EDUCATION/TRAINING PROGRAM

## 2024-03-04 RX ORDER — FUROSEMIDE 20 MG/1
20 TABLET ORAL DAILY
Qty: 30 TABLET | Refills: 0 | Status: SHIPPED | OUTPATIENT
Start: 2024-03-04 | End: 2024-04-03

## 2024-03-04 RX ORDER — HYDROCODONE BITARTRATE AND ACETAMINOPHEN 5; 325 MG/1; MG/1
1 TABLET ORAL EVERY 8 HOURS PRN
Qty: 10 TABLET | Refills: 0 | Status: SHIPPED | OUTPATIENT
Start: 2024-03-04 | End: 2024-03-07

## 2024-03-04 RX ORDER — LACTULOSE 10 G/15ML
20 SOLUTION ORAL 3 TIMES DAILY
Qty: 2700 ML | Refills: 0 | Status: SHIPPED | OUTPATIENT
Start: 2024-03-04 | End: 2024-04-03

## 2024-03-04 RX ORDER — CIPROFLOXACIN 500 MG/1
500 TABLET, FILM COATED ORAL DAILY
Qty: 2 TABLET | Refills: 0 | Status: SHIPPED | OUTPATIENT
Start: 2024-03-04 | End: 2024-03-06

## 2024-03-04 NOTE — SIGNIFICANT EVENT
NURSING DISCHARGE NOTE    Discharged Home via Wheelchair.  Accompanied by RN and Support staff  Belongings Taken by patient/family.    Patient discharged home. IV removed. All questions answered. Left unit in stable condition.

## 2024-03-04 NOTE — PLAN OF CARE
A&Ox4. On room air, lungs clear. Abdomen round, firm, distended, and tender, BS active. Denies N&V. States she is starting to have BM's. C/o abdominal and back pain- PRN Norco given with some relief. PRN Benadryl given for itching. Receiving IV Rocephin q24h for elevated WBCs. No fevers this shift. Up ad santosh. Needs met. Will monitor.    Problem: Patient/Family Goals  Goal: Patient/Family Long Term Goal  Description: Patient's Long Term Goal: Decrease WBCs    Interventions:  - IV abx?  - monitor labs  - See additional Care Plan goals for specific interventions  Outcome: Progressing  Goal: Patient/Family Short Term Goal  Description: Patient's Short Term Goal: pain management    Interventions:   - per MAR    - See additional Care Plan goals for specific interventions  Outcome: Progressing     Problem: PAIN - ADULT  Goal: Verbalizes/displays adequate comfort level or patient's stated pain goal  Description: INTERVENTIONS:  - Encourage pt to monitor pain and request assistance  - Assess pain using appropriate pain scale  - Administer analgesics based on type and severity of pain and evaluate response  - Implement non-pharmacological measures as appropriate and evaluate response  - Consider cultural and social influences on pain and pain management  - Manage/alleviate anxiety  - Utilize distraction and/or relaxation techniques  - Monitor for opioid side effects  - Notify MD/LIP if interventions unsuccessful or patient reports new pain  - Anticipate increased pain with activity and pre-medicate as appropriate  Outcome: Progressing     Problem: RISK FOR INFECTION - ADULT  Goal: Absence of fever/infection during anticipated neutropenic period  Description: INTERVENTIONS  - Monitor WBC  - Administer growth factors as ordered  - Implement neutropenic guidelines  Outcome: Progressing     Problem: SAFETY ADULT - FALL  Goal: Free from fall injury  Description: INTERVENTIONS:  - Assess pt frequently for physical needs  - Identify  cognitive and physical deficits and behaviors that affect risk of falls.  - [place] fall precautions as indicated by assessment.  - Educate pt/family on patient safety including physical limitations  - Instruct pt to call for assistance with activity based on assessment  - Modify environment to reduce risk of injury  - Provide assistive devices as appropriate  - Consider OT/PT consult to assist with strengthening/mobility  - Encourage toileting schedule  Outcome: Progressing     Problem: DISCHARGE PLANNING  Goal: Discharge to home or other facility with appropriate resources  Description: INTERVENTIONS:  - Identify barriers to discharge w/pt and caregiver  - Include patient/family/discharge partner in discharge planning  - Arrange for needed discharge resources and transportation as appropriate  - Identify discharge learning needs (meds, wound care, etc)  - Arrange for interpreters to assist at discharge as needed  - Consider post-discharge preferences of patient/family/discharge partner  - Complete POLST form as appropriate  - Assess patient's ability to be responsible for managing their own health  - Refer to Case Management Department for coordinating discharge planning if the patient needs post-hospital services based on physician/LIP order or complex needs related to functional status, cognitive ability or social support system  Outcome: Progressing

## 2024-03-04 NOTE — PROGRESS NOTES
[facility]   part of [facility]     Hospitalist Progress Note     Tamy Wall Patient Status:  Observation    1980 MRN [id number]   Location [facility] 3NE-A Attending Vitaliy, Ivan Lew, *   Hosp Day # 1 PCP Bria Griffin MD     Chief Complaint: Abdominal pain     Subjective:      - Remains afebrile   - 3 bowel movements recorded in past 24hrs, abd discomfort improved      Objective:    Review of Systems:   A comprehensive review of systems was completed; pertinent positive and negatives stated in subjective.    Vital signs:  Temp:  [97.8 °F (36.6 °C)-98.2 °F (36.8 °C)] 97.9 °F (36.6 °C)  Pulse:  [55-73] 70  Resp:  [16] 16  BP: ()/(64-69) 92/64    Physical Exam:    General: No acute distress  Respiratory: no wheezes, no rhonchi  Cardiovascular: S1, S2, regular rate and rhythm  Abdomen: Soft, mild ttp over lower abd, moderately-distended, positive bowel sounds  Neuro: No new focal deficits.   Extremities: no edema    Diagnostic Data:    Labs:  Recent Labs   Lab 24  1304 24  2236 24  1210 24  0843 24  1053   WBC 14.4* 12.6* 11.9* 14.1* 14.7*   HGB 9.8* 9.7* 9.0* 9.4* 10.4*   .8* 104.3* 101.9* 101.1* 104.0*   .0 281.0 263.0 309.0 358.0   INR 1.23*  --   --   --   --        Recent Labs   Lab 24  1304 24  2236 24  0613   GLU 73 87 98   BUN 7* 6* 5*   CREATSERUM 0.64 0.54* 0.42*   CA 9.0 8.7 8.7   ALB 2.8* 2.6* 2.2*    140 140   K 3.9 4.5 3.9    108 109   CO2 25.0 27.0 27.0   ALKPHO 210* 209* 174*   * 151* 124*   ALT 39 36 28   BILT 1.8 1.7 1.2   TP 7.0 7.1 6.3*       Estimated Creatinine Clearance: 129 mL/min (A) (based on SCr of 0.42 mg/dL (L)).    No results for input(s): \"TROP\", \"TROPHS\", \"CK\" in the last 168 hours.    Recent Labs   Lab 24  1304   PTP 15.6*   INR 1.23*                  Microbiology    Hospital Encounter on 24   1. Blood Culture     Status: None (Preliminary result)     Collection Time: 02/29/24 10:50 PM    Specimen: Blood,peripheral   Result Value Ref Range    Blood Culture Result No Growth 3 Days N/A         Imaging: Reviewed in Epic.    Medications:    lactulose  20 g Oral TID    magnesium hydroxide  30 mL Oral Once    sennosides  17.2 mg Oral BID    furosemide  20 mg Oral Daily    [Held by provider] enoxaparin  40 mg Subcutaneous Daily    thiamine  100 mg Oral BID    cholecalciferol  5,000 Units Oral Daily    polyethylene glycol (PEG 3350)  17 g Oral Daily    midodrine  10 mg Oral TID    pantoprazole  40 mg Oral QAM AC    folic acid  1 mg Oral Daily    cefTRIAXone  1 g Intravenous Q24H       Assessment & Plan:      # Leukocytosis   - continuing Ceftriaxone for empiric SBP treatment; will complete course as outpt   - Bcx - NGTD   - RVP negative, UA with mild pyuria though covered by above abx    - CXR PA/LA without acute pathology and CT A/p with significant constipation, no other acute pathology    - Can add on CT T/L spine - no acute abnormalities noted    - Unclear etiology of persistent leukocytosis, however, no clear infection found, possible stress reaction vs. Mild UTI? Would recommend f/u with PCP for repeat CBC and w/u     # Decompensated alcoholic cirrhosis    - Could not tolerated nguyen in past 2/2 hyperkalemia   - continue lasix, midodrine   - f/u with GI as outpt; has appt on 3/6 with Dr. Smyth at 11:40   - No HE or GIB noted    # Constipation - Mirilax, senna, milk of mag; lactulose and bisacodyl suppository ; multiple Bms recorded   # Anemia - likely 2/2 liver disease    Home today     Ivan Burks MD    Supplementary Documentation:     Quality:  DVT Mechanical Prophylaxis:   SCDs,    DVT Pharmacologic Prophylaxis   Medication    [Held by provider] enoxaparin (Lovenox) 40 MG/0.4ML SUBQ injection 40 mg                Code Status: Prior  Larose: No urinary catheter in place  Larose Duration (in days):   Central line:    MICKY:     The 21st Century Cures Act  makes medical notes like these available to patients in the interest of transparency. Please be advised this is a medical document. Medical documents are intended to carry relevant information, facts as evident, and the clinical opinion of the practitioner. The medical note is intended as peer to peer communication and may appear blunt or direct. It is written in medical language and may contain abbreviations or verbiage that are unfamiliar.

## 2024-03-05 ENCOUNTER — PATIENT OUTREACH (OUTPATIENT)
Dept: CASE MANAGEMENT | Age: 44
End: 2024-03-05

## 2024-03-05 NOTE — PAYOR COMM NOTE
--------------  DISCHARGE REVIEW    Payor: [employer]  Subscriber #:  [id number]  Authorization Number: [id number]    Admit date: 3/3/24  Admit time:   2:42 PM  Discharge Date: 3/4/2024  1:33 PM     Admitting Physician: Sosa Wood MD  Attending Physician:  No att. providers found  Primary Care Physician: Bria Griffin MD       Discharge Summary Notes    No notes of this type exist for this encounter.         REVIEWER COMMENTS

## 2024-03-05 NOTE — PAYOR COMM NOTE
STARTED OUT OBSERVATION  ON   MADE INPT 3/3    ADMISSION REVIEW     Payor: KARINE  Subscriber #:  [id number]  Authorization Number: [id number]    Admit date: 3/3/24  Admit time:  2:42 PM       REVIEW DOCUMENTATION:     ED Provider Notes        ED Provider Notes signed by Andrew Schultz MD at 3/1/2024 12:48 AM       Author: Andrew Schultz MD Service: -- Author Type: Physician    Filed: 3/1/2024 12:48 AM Date of Service: 3/1/2024 12:05 AM Status: Signed    : Andrew Schultz MD (Physician)           Patient Seen in: [facility] Emergency Department      History     Chief Complaint   Patient presents with    Abnormal Result     Stated Complaint: abnormal labs    Subjective:   HPI    43-year-old female presenting to the emerged part for abnormal labs patient is been having increasing abdominal distention is getting a bit more tender she had outpatient blood drawn today which showed elevated white cell count now presents emergency department she denies any sort of urinary complaints and just was treated for urinary tract patient she is has a chronic cough as a result of influenza last month.  She denies any sort of vomiting or any other exacerbating relieving factors associated symptom    Objective:   Past Medical History:   Diagnosis Date    Fibroid, uterine 2014    7cm , up to 12 cm at end of pregnacy    Liver failure (HCC)               Past Surgical History:   Procedure Laterality Date    ABD PARACENTESIS      APPENDECTOMY  1990      2014    [facility], fibroids    COLONOSCOPY N/A 2023    Procedure: COLONOSCOPY;  Surgeon: Israel Wesley DO;  Location:  ENDOSCOPY                Social History     Socioeconomic History    Marital status:    Tobacco Use    Smoking status: Former     Types: Cigarettes    Smokeless tobacco: Never   Vaping Use    Vaping Use: Never used   Substance and Sexual Activity    Alcohol use: No     Alcohol/week: 0.0 standard  drinks of alcohol    Drug use: No     Social Determinants of Health     Food Insecurity: Food Insecurity Present (2/19/2024)    Food Insecurity     Food Insecurity: Sometimes true   Transportation Needs: No Transportation Needs (2/19/2024)    Transportation Needs     Lack of Transportation: No   Housing Stability: Low Risk  (2/19/2024)    Housing Stability     Housing Instability: No              Review of Systems    Positive for stated complaint: abnormal labs  Other systems are as noted in HPI.  Constitutional and vital signs reviewed.      All other systems reviewed and negative except as noted above.    Physical Exam     ED Triage Vitals [02/29/24 2132]   /72   Pulse 89   Resp 18   Temp 98.2 °F (36.8 °C)   Temp src Oral   SpO2 98 %   O2 Device None (Room air)       Current:/80   Pulse 81   Temp 98.2 °F (36.8 °C) (Oral)   Resp 13   Ht 154.9 cm (5' 1\")   Wt 48.3 kg   LMP 01/01/2024 (Approximate)   SpO2 98%   BMI 20.10 kg/m²         Physical Exam  Awake alert patient appears no distress HEENT exam is normal lungs are clear cardiovascular exam regular rhythm abdomen distended abdomen diffusely tender no rebound no guarding shows no COVID cyanosis or edema no rash back exam is normal skin is nondiaphoretic with no focal neurologic deficits or asterixis       ED Course     Labs Reviewed   COMP METABOLIC PANEL (14) - Abnormal; Notable for the following components:       Result Value    BUN 6 (*)     Creatinine 0.54 (*)      (*)     Alkaline Phosphatase 209 (*)     Albumin 2.6 (*)     Globulin  4.5 (*)     A/G Ratio 0.6 (*)     All other components within normal limits   URINALYSIS, ROUTINE - Abnormal; Notable for the following components:    Clarity Urine Turbid (*)     Leukocyte Esterase Urine 75 (*)     WBC Urine 6-10 (*)     Squamous Epi. Cells Few (*)     All other components within normal limits   CBC W/ DIFFERENTIAL - Abnormal; Notable for the following components:    WBC 12.6 (*)      RBC 2.81 (*)     HGB 9.7 (*)     HCT 29.3 (*)     .3 (*)     MCH 34.5 (*)     Neutrophil Absolute Prelim 8.93 (*)     Neutrophil Absolute 8.93 (*)     All other components within normal limits   LACTIC ACID, PLASMA - Normal   POCT PREGNANCY URINE - Normal   CBC WITH DIFFERENTIAL WITH PLATELET    Narrative:     The following orders were created for panel order CBC With Differential With Platelet.  Procedure                               Abnormality         Status                     ---------                               -----------         ------                     CBC W/ DIFFERENTIAL[[id number]]          Abnormal            Final result                 Please view results for these tests on the individual orders.   BLOOD CULTURE   BLOOD CULTURE     Differential diagnosis includes SBP, sepsis        MDM          Medical Decision Making  43-year-old female presenting the emergency department for abdominal pain.  Fever.  Patient was noted to IV established cardiac monitor shows a sinus rhythm pulse ox shows no signs of hypoxia CBC shows elevated white count metabolic panel stable renal function patient was ordered Rocephin will be admitted for antibiotics and further evaluation  This note was prepared using Dragon Medical voice recognition dictation software.  As a result errors may occur.  When identified to these areas have been corrected.  While every attempt is made to correct errors during dictation discrepancies may still exist.  Please contact if there are any errors    Problems Addressed:  SBP (spontaneous bacterial peritonitis) (HCC): acute illness or injury    Amount and/or Complexity of Data Reviewed  Labs: ordered. Decision-making details documented in ED Course.  ECG/medicine tests: ordered and independent interpretation performed. Decision-making details documented in ED Course.    Risk  Decision regarding hospitalization.        Disposition and Plan     Clinical Impression:  1. SBP (spontaneous  bacterial peritonitis) (HCC)         Signed by Andrew Schultz MD on 3/1/2024 12:48 AM        Date/Time Temp Pulse Resp BP SpO2 Weight O2 Device O2 Flow Rate (L/min) [facility]    03/04/24 0752 98 °F (36.7 °C) 64 18 94/62 98 % -- None (Room air) 0 L/min AL    03/04/24 0502 97.9 °F (36.6 °C) 70 16 92/64 -- -- None (Room air) --     03/03/24 1920 98.2 °F (36.8 °C) 55 16 98/64 -- -- None (Room air) -- JH    03/03/24 1630 97.8 °F (36.6 °C) 73 -- 103/69 -- -- -- -- TL    03/03/24 0733 98.5 °F (36.9 °C) -- -- 92/68 -- -- None (Room air) -- TL    03/03/24 0415 98.1 °F (36.7 °C) 72 18 92/74 -- -- -- -- TB             3/1 H&P    Chief Complaint: Abdominal distension         Subjective:   History of Present Illness:      Taym Wall is a 43 year old female with etoh liver cirrhosis with recurrent admissions- pt most recentl DC on 2/22/24- she comes today with  abnormal blood work. Pt was evaluated at PCP office for routine post hospital f/u- labs were ordered and she was contacted last night due to leucocytosis mainly. She notes some fevers at home  range, has a dry cough. She has just completed Abx for UTI and still has some mild dysuria, patient also notes her abdomen is filling up again and is tender.     Physical Exam:    BP 98/75 (BP Location: Left arm)   Pulse 85   Temp 98 °F (36.7 °C) (Oral)   Resp 20   Ht 5' 1\" (1.549 m)   Wt 108 lb (49 kg)   LMP 01/01/2024 (Approximate)   SpO2 99%   BMI 20.41 kg/m²   General: No acute distress, Alert  Respiratory: No rhonchi, no wheezes  Cardiovascular: S1, S2. Regular rate and rhythm  Abdomen: Soft, -tender, distended, positive bowel sounds  Neuro: No new focal deficits  Extremities: No edema           Recent Labs   Lab 02/29/24  1304 02/29/24  2236   RBC 2.96* 2.81*   HGB 9.8* 9.7*   HCT 31.9* 29.3*   .8* 104.3*   MCH 33.1 34.5*   MCHC 30.7* 33.1   RDW 13.0 13.0   NEPRELIM 10.05* 8.93*   WBC 14.4* 12.6*   .0 281.0              Recent Labs   Lab  02/29/24  1304 02/29/24  2236   GLU 73 87   BUN 7* 6*   CREATSERUM 0.64 0.54*   EGFRCR 112 116   CA 9.0 8.7   ALB 2.8* 2.6*    140   K 3.9 4.5    108   CO2 25.0 27.0   ALKPHO 210* 209*   * 151*   ALT 39 36   BILT 1.8 1.7   TP 7.0 7.1       Assessment & Plan:   #Leucocytosis with fever  Diagnostic paracentesis with cultures- hold off therapeutic in the event she does have SBP  Resp viral panel  UA negative   #Anemia- chronic   #etoh liver cirrhosis with ascites  Continue diuretics   #chronic hypotension            Plan of care discussed with pt    3/2 HOSPITALIST NOTE         Subjective:   - Remains afebrile, US completed but no significant fluid pocket available for paracentesis   - endorsing ongoing discomfort with abd distension    - Notes constipation, no BM since admission, having cramping abd pain   - Denies other cp, cough, congestion, rhinorrhea, dysuria, urinary frequency           Objective:   Review of Systems:   A comprehensive review of systems was completed; pertinent positive and negatives stated in subjective.     Vital signs:  Temp:  [98.1 °F (36.7 °C)-98.3 °F (36.8 °C)] 98.3 °F (36.8 °C)  Pulse:  [75-78] 75  Resp:  [17-18] 17  BP: ()/(64-74) 102/74  SpO2:  [100 %] 100 %     Physical Exam:    Abdomen: Soft, diffusely ttp, non-distended, positive bowel sounds             Recent Labs   Lab 02/29/24  1304 02/29/24  2236 03/02/24  0613   GLU 73 87 98   BUN 7* 6* 5*   CREATSERUM 0.64 0.54* 0.42*   CA 9.0 8.7 8.7   ALB 2.8* 2.6* 2.2*    140 140   K 3.9 4.5 3.9    108 109   CO2 25.0 27.0 27.0   ALKPHO 210* 209* 174*   * 151* 124*   ALT 39 36 28   BILT 1.8 1.7 1.2   TP 7.0 7.1 6.3*       Medications:    magnesium oxide  400 mg Oral Once    [Held by provider] enoxaparin  40 mg Subcutaneous Daily    thiamine  100 mg Oral BID    cholecalciferol  5,000 Units Oral Daily    polyethylene glycol (PEG 3350)  17 g Oral Daily    furosemide  10 mg Oral Daily    midodrine  10  mg Oral TID    pantoprazole  40 mg Oral QAM AC    folic acid  1 mg Oral Daily    cefTRIAXone  1 g Intravenous Q24H               Assessment & Plan:   # Leukocytosis   - continuing Ceftriaxone for empiric SBP treatment; does have hx of prior paracentesis with low fluid protein levels, so can plan for dc with SBP prophy abx   - Bcx - NGTD   - RVP negative, UA with mild pyuria though covered by above abx    - Will add on CXR PA/LA and CT A/p to r/o acute intra-abdominal pathology      # Decompensated alcoholic cirrhosis    - Could not tolerated nguyen in past 2/2 hyperkalemia   - continue lasix, midodrine   - f/u with GI as outpt; has appt on 3/6 with Dr. Smyth at 11:40   - No HE or GIB noted     # Constipation - Mirilax, senna, milk of mag  # Anemia - likely 2/2 liver disease             3/3 HOSPITALIST NOTE    Chief Complaint: Abdominal pain         Subjective:   - Remains afebrile, though WBC still elevated, now notes she has been having thoracic and lumbar back pain for the past week, with associated tingling of bilateral feet, she is concerned about infection in that region   - Still without significant bowel movement following laxative regimen        Vital signs:  Temp:  [97.8 °F (36.6 °C)-99 °F (37.2 °C)] 98.5 °F (36.9 °C)  Pulse:  [57-72] 72  Resp:  [15-18] 18  BP: ()/(64-74) 92/68     Physical Exam:    General: No acute distress  Respiratory: no wheezes, no rhonchi  Cardiovascular: S1, S2, regular rate and rhythm  Abdomen: Soft, diffusely ttp, moderately-distended, positive bowel sounds  Neuro: No new focal deficits.   Extremities: no edema     Diagnostic Data:    Labs:         Recent Labs   Lab 02/29/24  1304 02/29/24  2236 03/01/24  1210 03/02/24  0843   WBC 14.4* 12.6* 11.9* 14.1*   HGB 9.8* 9.7* 9.0* 9.4*   .8* 104.3* 101.9* 101.1*   .0 281.0 263.0 309.0   INR 1.23*  --   --   --                Recent Labs   Lab 02/29/24  1304 02/29/24  2236 03/02/24  0613   GLU 73 87 98   BUN 7* 6*  5*   CREATSERUM 0.64 0.54* 0.42*   CA 9.0 8.7 8.7   ALB 2.8* 2.6* 2.2*    140 140   K 3.9 4.5 3.9    108 109   CO2 25.0 27.0 27.0   ALKPHO 210* 209* 174*   * 151* 124*   ALT 39 36 28   BILT 1.8 1.7 1.2   TP 7.0 7.1 6.3*     Medications:    sennosides  17.2 mg Oral BID    furosemide  20 mg Oral Daily    [Held by provider] enoxaparin  40 mg Subcutaneous Daily    thiamine  100 mg Oral BID    cholecalciferol  5,000 Units Oral Daily    polyethylene glycol (PEG 3350)  17 g Oral Daily    midodrine  10 mg Oral TID    pantoprazole  40 mg Oral QAM AC    folic acid  1 mg Oral Daily    cefTRIAXone  1 g Intravenous Q24H               Assessment & Plan:   # Leukocytosis   - continuing Ceftriaxone for empiric SBP treatment; does have hx of prior paracentesis with low fluid protein levels, so can plan for dc with SBP prophy abx   - Bcx - NGTD   - RVP negative, UA with mild pyuria though covered by above abx    - CXR PA/LA without acute pathology and CT A/p with significant constipation, no other acute pathology    - Can add on CT T/L spine      # Decompensated alcoholic cirrhosis    - Could not tolerated nguyen in past 2/2 hyperkalemia   - continue lasix, midodrine   - f/u with GI as outpt; has appt on 3/6 with Dr. Smyth at 11:40   - No HE or GIB noted     # Constipation - Mirilax, senna, milk of mag; lactulose and bisacodyl suppository   # Anemia - likely 2/2 liver disease     3/4 HOSPITALIST NOTE    Subjective:   - Remains afebrile   - 3 bowel movements recorded in past 24hrs, abd discomfort improved              Objective:   Review of Systems:   A comprehensive review of systems was completed; pertinent positive and negatives stated in subjective.     Vital signs:  Temp:  [97.8 °F (36.6 °C)-98.2 °F (36.8 °C)] 97.9 °F (36.6 °C)  Pulse:  [55-73] 70  Resp:  [16] 16  BP: ()/(64-69) 92/64     Physical Exam:    General: No acute distress  Respiratory: no wheezes, no rhonchi  Cardiovascular: S1, S2, regular  rate and rhythm  Abdomen: Soft, mild ttp over lower abd, moderately-distended, positive bowel sounds  Neuro: No new focal deficits.   Extremities: no edema         Assessment & Plan:   # Leukocytosis   - continuing Ceftriaxone for empiric SBP treatment; will complete course as outpt   - Bcx - NGTD   - RVP negative, UA with mild pyuria though covered by above abx    - CXR PA/LA without acute pathology and CT A/p with significant constipation, no other acute pathology    - Can add on CT T/L spine - no acute abnormalities noted    - Unclear etiology of persistent leukocytosis, however, no clear infection found, possible stress reaction vs. Mild UTI? Would recommend f/u with PCP for repeat CBC and w/u      # Decompensated alcoholic cirrhosis    - Could not tolerated nguyen in past 2/2 hyperkalemia   - continue lasix, midodrine   - f/u with GI as outpt; has appt on 3/6 with Dr. Smyth at 11:40   - No HE or GIB noted     # Constipation - Mirilax, senna, milk of mag; lactulose and bisacodyl suppository ; multiple Bms recorded   # Anemia - likely 2/2 liver disease     Home today

## 2024-03-05 NOTE — PROGRESS NOTES
NCM attempted to speak with pt, woman answered stating pt was not available at this time, NCM asked name of person on phone, then call was disconnected. NCM will try calling back at a later time.

## 2024-03-07 ENCOUNTER — TELEPHONE (OUTPATIENT)
Dept: INTERNAL MEDICINE CLINIC | Facility: CLINIC | Age: 44
End: 2024-03-07

## 2024-03-07 NOTE — TELEPHONE ENCOUNTER
Pt used to have Medicaid, but just now switched to [place] insurance, and they said that Dr Griffin cannot be her PCP, but she can call and request to take [place] by calling. Pt is asking us to do this.    [phone]  Option 3

## 2024-03-08 ENCOUNTER — PATIENT OUTREACH (OUTPATIENT)
Dept: CASE MANAGEMENT | Age: 44
End: 2024-03-08

## 2024-03-08 NOTE — DISCHARGE SUMMARY
[facility]     Tamy Wall Patient Status:  Inpatient    1980 MRN [id number]   Location [facility] 3NE-A Attending No att. providers found   Hosp Day # 1 PCP None Pcp     Date of Admission: 2024  Date of Discharge: 3/4/2024  Discharge Disposition: Home or Self Care    Admitting Diagnosis:   SBP (spontaneous bacterial peritonitis) (HCC) [K65.2]    Hospital Discharge Diagnoses:   Leukocytosis  Decompensated alcoholic cirrhosis  Constipation  Anemia    Lace+ Score: 73  59-90 High Risk  29-58 Medium Risk  0-28   Low Risk.    TCM Follow-Up Recommendation:  LACE > 58: High Risk of readmission after discharge from the hospital.        Discharge Diagnosis:   Leukocytosis thought possibly related to SBP    History of Present Illness: Tamy Wall is a 43 year old female with etoh liver cirrhosis with recurrent admissions- pt most recentl DC on 24- she comes today with  abnormal blood work. Pt was evaluated at PCP office for routine post hospital f/u- labs were ordered and she was contacted last night due to leucocytosis mainly. She notes some fevers at home  range, has a dry cough. She has just completed Abx for UTI and still has some mild dysuria, patient also notes her abdomen is filling up again and is tender.     Brief Synopsis: Patient with leukocytosis and abdominal pain, thought potentially related to SBP given history of ascites and liver disease.  Started empirically on IV antibiotics.  Attempted to undergo paracentesis however not enough ascitic fluid found for tap.  Additional infectious workup completed with RVP negative, UA negative, blood cultures no growth to date.  CT abdomen pelvis without acute abnormalities noted.  CT T and L-spine without acute process.  Chest x-ray without pneumonia.  Generally no other clear source of infection noted on patient remained afebrile during the course of admission.  Transition to p.o. antibiotics for completion of  course for empiric SBP treatment.  Patient follow-up with PCP for further workup of leukocytosis if symptoms recur.    Procedures during hospitalization:   None    Incidental or significant findings and recommendations (brief descriptions):  None    Lab/Test results pending at Discharge:   None    Consultants:  None    Discharge Medication List:     Discharge Medications        START taking these medications        Instructions Prescription details   lactulose 10 GM/15ML Soln  Commonly known as: CHRONULAC      Take 30 mL (20 g total) by mouth 3 (three) times daily.   Stop taking on: April 3, 2024  Quantity: 2700 mL  Refills: 0            CHANGE how you take these medications        Instructions Prescription details   furosemide 20 MG Tabs  Commonly known as: Lasix  What changed: how much to take      Take 1 tablet (20 mg total) by mouth daily.   Stop taking on: April 3, 2024  Quantity: 30 tablet  Refills: 0            CONTINUE taking these medications        Instructions Prescription details   acetaminophen 325 MG Tabs  Commonly known as: Tylenol      Take 1 tablet (325 mg total) by mouth every 6 (six) hours as needed for Pain.   Refills: 0     Cholecalciferol 125 MCG (5000 UT) Tabs  Commonly known as: VITAMIN D-3      Take 1 tablet (5,000 Units total) by mouth daily.   Refills: 0     diphenhydrAMINE 25 MG Caps  Commonly known as: Benadryl Allergy      Take 1 capsule (25 mg total) by mouth every 6 (six) hours as needed for Itching (for itching).   Quantity: 60 capsule  Refills: 1     folic acid 1 MG Tabs  Commonly known as: Folvite      Take 1 tablet (1 mg total) by mouth daily.   Quantity: 30 tablet  Refills: 2     midodrine 10 MG Tabs  Commonly known as: ProAmatine      Take 1 tablet (10 mg total) by mouth in the morning and 1 tablet (10 mg total) at noon and 1 tablet (10 mg total) in the evening.   Quantity: 90 tablet  Refills: 1     pantoprazole 40 MG Tbec  Commonly known as: Protonix      Take 1 tablet (40 mg  total) by mouth every morning before breakfast.   Quantity: 90 tablet  Refills: 1     thiamine 100 MG Tabs  Commonly known as: Vitamin B1      Take 1 tablet (100 mg total) by mouth in the morning and 1 tablet (100 mg total) before bedtime.   Quantity: 60 tablet  Refills: 0            STOP taking these medications      cephalexin 500 MG Caps  Commonly known as: Keflex        HYDROcodone-acetaminophen 5-325 MG Tabs  Commonly known as: Norco        Polyethylene Glycol 3350 17 g Pack  Commonly known as: MIRALAX               ASK your doctor about these medications        Instructions Prescription details   ciprofloxacin 500 MG Tabs  Commonly known as: Cipro  Ask about: Should I take this medication?      Take 1 tablet (500 mg total) by mouth daily for 2 days.   Stop taking on: March 6, 2024  Quantity: 2 tablet  Refills: 0     HYDROcodone-acetaminophen 5-325 MG Tabs  Commonly known as: Norco  Ask about: Should I take this medication?      Take 1 tablet by mouth every 8 (eight) hours as needed.   Stop taking on: March 7, 2024  Quantity: 10 tablet  Refills: 0               Where to Get Your Medications        These medications were sent to [facility]/pharmacy #[id number] - [place] [id number] [facility] [address], [phone], [phone]  [address] [facility] [address] , [facility] [address]      Phone: [phone]   ciprofloxacin 500 MG Tabs  furosemide 20 MG Tabs  HYDROcodone-acetaminophen 5-325 MG Tabs  lactulose 10 GM/15ML Soln         [place]    Follow-up appointment:   Bria Griffin MD  [address] [address] [phone]    Schedule an appointment as soon as possible for a visit in 1 week(s)  For routine follow-up after hospitilization    Nonstaff, Physician    Go to  f/u with GI as outpt; has appt on 3/6 with Dr. Smyth at 11:40      Vital signs:       Physical Exam:  See exam from day of discharge  note  -----------------------------------------------------------------------------------------------  PATIENT DISCHARGE INSTRUCTIONS: See electronic chart    Ivan Burks MD 3/8/2024    Time spent: 38 minutes

## 2024-03-08 NOTE — PROCEDURES
The office order for PCP removal request is Approved and finalized on March 8, 2024.    Thanks,  [facility] Team

## 2024-04-15 NOTE — PLAN OF CARE
April 15, 2024      Re:   Kamilla Snow  [address] [facility] [id number]           This is to certify that Kamilla Snow has been under my care Monday April 15, 2024.  If able, she may return to school on Wednesday April 17, 2024    RESTRICTIONS:     REMARKS:       SIGNATURE: ___________________________________________,   4/15/2024      Bret Olvera, DO Bret Olvera,   [facility][note 3]  [address] [address] [phone]   A&Ox4. On room air, lungs clear. Denies SHIRA, shortness of breath, or cough. Abdomen soft and nontender, BS active. Denies N/V/D, +flatus, +belching. Right AC PIV saline locked. NSR, SB on tele. BLE non-pitting edema noted. Up ad santosh, voids. Currently receiving GoLytely and to be NPO at midnight. Plan is for patient to have an EGD/Colonoscopy tomorrow- signed consent on chart. . Needs met. Will monitor. Problem: Patient/Family Goals  Goal: Patient/Family Long Term Goal  Description: Patient's Long Term Goal: to go home    Interventions:  -   - See additional Care Plan goals for specific interventions  Outcome: Progressing  Goal: Patient/Family Short Term Goal  Description: Patient's Short Term Goal: to have the EGD/Colonoscopy    Interventions:   -   - See additional Care Plan goals for specific interventions  Outcome: Progressing     Problem: RISK FOR INFECTION - ADULT  Goal: Absence of fever/infection during anticipated neutropenic period  Description: INTERVENTIONS  - Monitor WBC  - Administer growth factors as ordered  - Implement neutropenic guidelines  Outcome: Progressing     Problem: SAFETY ADULT - FALL  Goal: Free from fall injury  Description: INTERVENTIONS:  - Assess pt frequently for physical needs  - Identify cognitive and physical deficits and behaviors that affect risk of falls.   - [place] fall precautions as indicated by assessment.  - Educate pt/family on patient safety including physical limitations  - Instruct pt to call for assistance with activity based on assessment  - Modify environment to reduce risk of injury  - Provide assistive devices as appropriate  - Consider OT/PT consult to assist with strengthening/mobility  - Encourage toileting schedule  Outcome: Progressing     Problem: DISCHARGE PLANNING  Goal: Discharge to home or other facility with appropriate resources  Description: INTERVENTIONS:  - Identify barriers to discharge w/pt and caregiver  - Include patient/family/discharge partner in discharge planning  - Arrange for needed discharge resources and transportation as appropriate  - Identify discharge learning needs (meds, wound care, etc)  - Arrange for interpreters to assist at discharge as needed  - Consider post-discharge preferences of patient/family/discharge partner  - Complete POLST form as appropriate  - Assess patient's ability to be responsible for managing their own health  - Refer to Case Management Department for coordinating discharge planning if the patient needs post-hospital services based on physician/LIP order or complex needs related to functional status, cognitive ability or social support system  Outcome: Progressing

## 2024-06-20 NOTE — ED QUICK NOTES
Monitor summary: SR 86-95, UT -0.16, QRS -0.08, QT -0.34, with (o) PVCs per strip from the monitor room.     Patient in ultrasound at this time.

## (undated) DEVICE — BIOGUARD CLEANING ADAPTER

## (undated) DEVICE — 10FT COMBINED O2 DELIVERY/CO2 MONITORING. FILTER WITH MICROSTREAM TYPE LUER: Brand: DUAL ADULT NASAL CANNULA

## (undated) DEVICE — 3M™ RED DOT™ MONITORING ELECTRODE WITH FOAM TAPE AND STICKY GEL, 50/BAG, 20/CASE, 72/PLT 2570: Brand: RED DOT™

## (undated) DEVICE — BLOCK BITE MAXI 60FR

## (undated) DEVICE — KIT ENDO ORCAPOD 160/180/190

## (undated) DEVICE — FORCEP BIOPSY RJ4 LG CAP W/ND

## (undated) DEVICE — TRAP SPEC REMOVAL ETRAP 15CM

## (undated) DEVICE — ENDOSCOPY PACK - LOWER: Brand: MEDLINE INDUSTRIES, INC.

## (undated) DEVICE — SNARE 9MM 230CM 2.4MM EXACTO

## (undated) DEVICE — 1200CC GUARDIAN II: Brand: GUARDIAN

## (undated) NOTE — LETTER
01/25/19        Melissa Juarez  [address] [address]      Dear Balbina Boo records indicate that you have outstanding lab work and or testing that was ordered for you and has not yet been completed:  Orders Placed This Encounter

## (undated) NOTE — LETTER
Your patient was recently seen at the [facility] for a hospital follow-up visit. The visit note is attached. Please contact the clinic with any questions at [phone].     Thank you,  SILVANA Nava